# Patient Record
Sex: MALE | Race: WHITE | NOT HISPANIC OR LATINO | Employment: OTHER | URBAN - METROPOLITAN AREA
[De-identification: names, ages, dates, MRNs, and addresses within clinical notes are randomized per-mention and may not be internally consistent; named-entity substitution may affect disease eponyms.]

---

## 2023-03-04 PROBLEM — I21.4 NON-ST ELEVATION MYOCARDIAL INFARCTION (NSTEMI), SUBSEQUENT EPISODE OF CARE (MULTI): Status: ACTIVE | Noted: 2023-03-04

## 2023-03-04 PROBLEM — K21.9 GERD (GASTROESOPHAGEAL REFLUX DISEASE): Status: ACTIVE | Noted: 2023-03-04

## 2023-03-04 PROBLEM — E11.39: Status: ACTIVE | Noted: 2023-03-04

## 2023-03-04 PROBLEM — R68.89 COLD INTOLERANCE: Status: ACTIVE | Noted: 2023-03-04

## 2023-03-04 PROBLEM — E66.9 OBESITY: Status: ACTIVE | Noted: 2023-03-04

## 2023-03-04 PROBLEM — I25.10 CORONARY ARTERY DISEASE: Status: ACTIVE | Noted: 2023-03-04

## 2023-03-04 PROBLEM — K57.90 DIVERTICULOSIS: Status: ACTIVE | Noted: 2023-03-04

## 2023-03-04 PROBLEM — I44.0 HEART BLOCK AV FIRST DEGREE: Status: ACTIVE | Noted: 2023-03-04

## 2023-03-04 PROBLEM — Z86.010 HISTORY OF COLON POLYPS: Status: ACTIVE | Noted: 2023-03-04

## 2023-03-04 PROBLEM — R13.10 DYSPHAGIA: Status: ACTIVE | Noted: 2023-03-04

## 2023-03-04 PROBLEM — Z86.0100 HISTORY OF COLON POLYPS: Status: ACTIVE | Noted: 2023-03-04

## 2023-03-04 PROBLEM — Z86.79 HISTORY OF ATRIAL FIBRILLATION: Status: ACTIVE | Noted: 2023-03-04

## 2023-03-04 PROBLEM — R93.1 HIGH CORONARY ARTERY CALCIUM SCORE: Status: ACTIVE | Noted: 2023-03-04

## 2023-03-04 PROBLEM — N18.2 CKD STAGE 2 DUE TO TYPE 2 DIABETES MELLITUS (MULTI): Status: ACTIVE | Noted: 2023-03-04

## 2023-03-04 PROBLEM — I10 BENIGN ESSENTIAL HYPERTENSION: Status: ACTIVE | Noted: 2023-03-04

## 2023-03-04 PROBLEM — M25.511 SHOULDER PAIN, RIGHT: Status: ACTIVE | Noted: 2023-03-04

## 2023-03-04 PROBLEM — R94.31 ABNORMAL EKG: Status: ACTIVE | Noted: 2023-03-04

## 2023-03-04 PROBLEM — I95.9 SYMPTOMATIC HYPOTENSION: Status: ACTIVE | Noted: 2023-03-04

## 2023-03-04 PROBLEM — Z86.39 HISTORY OF HYPERLIPIDEMIA: Status: ACTIVE | Noted: 2023-03-04

## 2023-03-04 PROBLEM — K22.70 BARRETT ESOPHAGUS: Status: ACTIVE | Noted: 2023-03-04

## 2023-03-04 PROBLEM — E11.22 CKD STAGE 2 DUE TO TYPE 2 DIABETES MELLITUS (MULTI): Status: ACTIVE | Noted: 2023-03-04

## 2023-03-04 PROBLEM — G47.33 OSA (OBSTRUCTIVE SLEEP APNEA): Status: ACTIVE | Noted: 2023-03-04

## 2023-03-04 PROBLEM — N18.31 STAGE 3A CHRONIC KIDNEY DISEASE (MULTI): Status: ACTIVE | Noted: 2023-03-04

## 2023-03-04 RX ORDER — INSULIN GLARGINE 300 U/ML
6 INJECTION, SOLUTION SUBCUTANEOUS NIGHTLY
COMMUNITY
End: 2023-05-02 | Stop reason: ALTCHOICE

## 2023-03-04 RX ORDER — PEN NEEDLE, DIABETIC 30 GX3/16"
NEEDLE, DISPOSABLE MISCELLANEOUS 4 TIMES DAILY
COMMUNITY

## 2023-03-04 RX ORDER — ASPIRIN 325 MG
50000 TABLET, DELAYED RELEASE (ENTERIC COATED) ORAL
COMMUNITY
Start: 2022-04-18 | End: 2023-04-26 | Stop reason: ALTCHOICE

## 2023-03-04 RX ORDER — CANDESARTAN 32 MG/1
0.5 TABLET ORAL DAILY
COMMUNITY
End: 2023-05-02 | Stop reason: DRUGHIGH

## 2023-03-04 RX ORDER — ROSUVASTATIN CALCIUM 40 MG/1
40 TABLET, COATED ORAL DAILY
COMMUNITY
End: 2023-05-10 | Stop reason: SDUPTHER

## 2023-03-04 RX ORDER — SEMAGLUTIDE 1.34 MG/ML
INJECTION, SOLUTION SUBCUTANEOUS
COMMUNITY
End: 2023-04-19 | Stop reason: ALTCHOICE

## 2023-03-04 RX ORDER — FLASH GLUCOSE SENSOR
KIT MISCELLANEOUS
COMMUNITY
End: 2023-11-01 | Stop reason: SDUPTHER

## 2023-03-04 RX ORDER — ASPIRIN 81 MG/1
1 TABLET ORAL DAILY
COMMUNITY
Start: 2022-04-26

## 2023-03-04 RX ORDER — PRASUGREL 10 MG/1
10 TABLET, FILM COATED ORAL DAILY
COMMUNITY
Start: 2022-11-29 | End: 2024-04-15 | Stop reason: SDUPTHER

## 2023-03-04 RX ORDER — INSULIN LISPRO 100 [IU]/ML
60-100 INJECTION, SOLUTION INTRAVENOUS; SUBCUTANEOUS
COMMUNITY
Start: 2022-03-07

## 2023-03-04 RX ORDER — METOPROLOL SUCCINATE 25 MG/1
0.5 TABLET, EXTENDED RELEASE ORAL DAILY
COMMUNITY
End: 2023-05-09 | Stop reason: SDUPTHER

## 2023-03-04 RX ORDER — LANSOPRAZOLE 30 MG/1
30 CAPSULE, DELAYED RELEASE ORAL
COMMUNITY
End: 2023-11-13

## 2023-03-07 ENCOUNTER — APPOINTMENT (OUTPATIENT)
Dept: PRIMARY CARE | Facility: CLINIC | Age: 63
End: 2023-03-07
Payer: COMMERCIAL

## 2023-03-16 ENCOUNTER — APPOINTMENT (OUTPATIENT)
Dept: PRIMARY CARE | Facility: CLINIC | Age: 63
End: 2023-03-16
Payer: COMMERCIAL

## 2023-03-16 ENCOUNTER — TELEPHONE (OUTPATIENT)
Dept: PRIMARY CARE | Facility: CLINIC | Age: 63
End: 2023-03-16

## 2023-03-16 DIAGNOSIS — E11.39 TYPE 2 DIABETES MELLITUS WITH OTHER OPHTHALMIC COMPLICATION, UNSPECIFIED WHETHER LONG TERM INSULIN USE (MULTI): ICD-10-CM

## 2023-03-16 DIAGNOSIS — Z00.00 HEALTH CARE MAINTENANCE: ICD-10-CM

## 2023-03-20 NOTE — TELEPHONE ENCOUNTER
Other than out standard labs - do not see anything add'l to order per cardio but per [facility] doc suggested TSH, Lipids,CMP, A1c, and in orders is albumin (spot) - will add the boldened ones - Thanks!

## 2023-04-06 ENCOUNTER — APPOINTMENT (OUTPATIENT)
Dept: PRIMARY CARE | Facility: CLINIC | Age: 63
End: 2023-04-06
Payer: COMMERCIAL

## 2023-04-17 ENCOUNTER — LAB (OUTPATIENT)
Dept: LAB | Facility: LAB | Age: 63
End: 2023-04-17
Payer: COMMERCIAL

## 2023-04-17 DIAGNOSIS — Z00.00 HEALTH CARE MAINTENANCE: ICD-10-CM

## 2023-04-17 DIAGNOSIS — E11.39 TYPE 2 DIABETES MELLITUS WITH OTHER OPHTHALMIC COMPLICATION, UNSPECIFIED WHETHER LONG TERM INSULIN USE (MULTI): ICD-10-CM

## 2023-04-17 LAB
ALANINE AMINOTRANSFERASE (SGPT) (U/L) IN SER/PLAS: 43 U/L (ref 10–52)
ALBUMIN (G/DL) IN SER/PLAS: 4.7 G/DL (ref 3.4–5)
ALKALINE PHOSPHATASE (U/L) IN SER/PLAS: 104 U/L (ref 33–136)
ANION GAP IN SER/PLAS: 16 MMOL/L (ref 10–20)
ASPARTATE AMINOTRANSFERASE (SGOT) (U/L) IN SER/PLAS: 33 U/L (ref 9–39)
BASOPHILS (10*3/UL) IN BLOOD BY AUTOMATED COUNT: 0.12 X10E9/L (ref 0–0.1)
BASOPHILS/100 LEUKOCYTES IN BLOOD BY AUTOMATED COUNT: 1.2 % (ref 0–2)
BILIRUBIN TOTAL (MG/DL) IN SER/PLAS: 0.7 MG/DL (ref 0–1.2)
C REACTIVE PROTEIN (MG/L) IN SER/PLAS BY HIGH SENSIT: 1.5 MG/L
CALCIDIOL (25 OH VITAMIN D3) (NG/ML) IN SER/PLAS: 95 NG/ML
CALCIUM (MG/DL) IN SER/PLAS: 10.3 MG/DL (ref 8.6–10.6)
CARBON DIOXIDE, TOTAL (MMOL/L) IN SER/PLAS: 27 MMOL/L (ref 21–32)
CHLORIDE (MMOL/L) IN SER/PLAS: 102 MMOL/L (ref 98–107)
CHOLESTEROL (MG/DL) IN SER/PLAS: 103 MG/DL (ref 0–199)
CHOLESTEROL IN HDL (MG/DL) IN SER/PLAS: 38 MG/DL
CHOLESTEROL/HDL RATIO: 2.7
CREATININE (MG/DL) IN SER/PLAS: 1.42 MG/DL (ref 0.5–1.3)
EOSINOPHILS (10*3/UL) IN BLOOD BY AUTOMATED COUNT: 0.39 X10E9/L (ref 0–0.7)
EOSINOPHILS/100 LEUKOCYTES IN BLOOD BY AUTOMATED COUNT: 3.7 % (ref 0–6)
ERYTHROCYTE DISTRIBUTION WIDTH (RATIO) BY AUTOMATED COUNT: 13.8 % (ref 11.5–14.5)
ERYTHROCYTE MEAN CORPUSCULAR HEMOGLOBIN CONCENTRATION (G/DL) BY AUTOMATED: 32.4 G/DL (ref 32–36)
ERYTHROCYTE MEAN CORPUSCULAR VOLUME (FL) BY AUTOMATED COUNT: 91 FL (ref 80–100)
ERYTHROCYTES (10*6/UL) IN BLOOD BY AUTOMATED COUNT: 5.59 X10E12/L (ref 4.5–5.9)
GFR MALE: 55 ML/MIN/1.73M2
GLUCOSE (MG/DL) IN SER/PLAS: 76 MG/DL (ref 74–99)
HEMATOCRIT (%) IN BLOOD BY AUTOMATED COUNT: 51 % (ref 41–52)
HEMOGLOBIN (G/DL) IN BLOOD: 16.5 G/DL (ref 13.5–17.5)
IMMATURE GRANULOCYTES/100 LEUKOCYTES IN BLOOD BY AUTOMATED COUNT: 0.4 % (ref 0–0.9)
LDL: 44 MG/DL (ref 0–99)
LEUKOCYTES (10*3/UL) IN BLOOD BY AUTOMATED COUNT: 10.4 X10E9/L (ref 4.4–11.3)
LYMPHOCYTES (10*3/UL) IN BLOOD BY AUTOMATED COUNT: 2.2 X10E9/L (ref 1.2–4.8)
LYMPHOCYTES/100 LEUKOCYTES IN BLOOD BY AUTOMATED COUNT: 21.1 % (ref 13–44)
MONOCYTES (10*3/UL) IN BLOOD BY AUTOMATED COUNT: 0.8 X10E9/L (ref 0.1–1)
MONOCYTES/100 LEUKOCYTES IN BLOOD BY AUTOMATED COUNT: 7.7 % (ref 2–10)
NEUTROPHILS (10*3/UL) IN BLOOD BY AUTOMATED COUNT: 6.88 X10E9/L (ref 1.2–7.7)
NEUTROPHILS/100 LEUKOCYTES IN BLOOD BY AUTOMATED COUNT: 65.9 % (ref 40–80)
NRBC (PER 100 WBCS) BY AUTOMATED COUNT: 0 /100 WBC (ref 0–0)
PLATELETS (10*3/UL) IN BLOOD AUTOMATED COUNT: 256 X10E9/L (ref 150–450)
POTASSIUM (MMOL/L) IN SER/PLAS: 5 MMOL/L (ref 3.5–5.3)
PROSTATE SPECIFIC ANTIGEN,SCREEN: 1.15 NG/ML (ref 0–4)
PROTEIN TOTAL: 7.1 G/DL (ref 6.4–8.2)
SODIUM (MMOL/L) IN SER/PLAS: 140 MMOL/L (ref 136–145)
THYROTROPIN (MIU/L) IN SER/PLAS BY DETECTION LIMIT <= 0.05 MIU/L: 2.11 MIU/L (ref 0.44–3.98)
TRIGLYCERIDE (MG/DL) IN SER/PLAS: 104 MG/DL (ref 0–149)
UREA NITROGEN (MG/DL) IN SER/PLAS: 28 MG/DL (ref 6–23)
VLDL: 21 MG/DL (ref 0–40)

## 2023-04-17 PROCEDURE — 36415 COLL VENOUS BLD VENIPUNCTURE: CPT

## 2023-04-17 PROCEDURE — 85025 COMPLETE CBC W/AUTO DIFF WBC: CPT

## 2023-04-17 PROCEDURE — 84153 ASSAY OF PSA TOTAL: CPT

## 2023-04-17 PROCEDURE — 82306 VITAMIN D 25 HYDROXY: CPT

## 2023-04-17 PROCEDURE — 84443 ASSAY THYROID STIM HORMONE: CPT

## 2023-04-17 PROCEDURE — 80053 COMPREHEN METABOLIC PANEL: CPT

## 2023-04-17 PROCEDURE — 83036 HEMOGLOBIN GLYCOSYLATED A1C: CPT

## 2023-04-17 PROCEDURE — 86141 C-REACTIVE PROTEIN HS: CPT

## 2023-04-17 PROCEDURE — 80061 LIPID PANEL: CPT

## 2023-04-18 ENCOUNTER — APPOINTMENT (OUTPATIENT)
Dept: LAB | Facility: LAB | Age: 63
End: 2023-04-18
Payer: COMMERCIAL

## 2023-04-18 LAB
ALBUMIN (MG/L) IN URINE: 123.3 MG/L
ALBUMIN/CREATININE (UG/MG) IN URINE: 108.2 UG/MG CRT (ref 0–30)
CREATININE (MG/DL) IN URINE: 114 MG/DL (ref 20–370)
ESTIMATED AVERAGE GLUCOSE FOR HBA1C: 126 MG/DL
HEMOGLOBIN A1C/HEMOGLOBIN TOTAL IN BLOOD: 6 %

## 2023-04-18 PROCEDURE — 81003 URINALYSIS AUTO W/O SCOPE: CPT

## 2023-04-18 PROCEDURE — 82043 UR ALBUMIN QUANTITATIVE: CPT

## 2023-04-18 PROCEDURE — 82570 ASSAY OF URINE CREATININE: CPT

## 2023-04-19 ENCOUNTER — OFFICE VISIT (OUTPATIENT)
Dept: PRIMARY CARE | Facility: CLINIC | Age: 63
End: 2023-04-19
Payer: COMMERCIAL

## 2023-04-19 VITALS
BODY MASS INDEX: 28.98 KG/M2 | WEIGHT: 207 LBS | TEMPERATURE: 98 F | SYSTOLIC BLOOD PRESSURE: 100 MMHG | HEIGHT: 71 IN | HEART RATE: 88 BPM | OXYGEN SATURATION: 99 % | DIASTOLIC BLOOD PRESSURE: 58 MMHG

## 2023-04-19 DIAGNOSIS — I25.10 CORONARY ARTERY DISEASE INVOLVING NATIVE CORONARY ARTERY OF NATIVE HEART WITHOUT ANGINA PECTORIS: ICD-10-CM

## 2023-04-19 DIAGNOSIS — E11.39 TYPE 2 DIABETES MELLITUS WITH OTHER OPHTHALMIC COMPLICATION, WITH LONG-TERM CURRENT USE OF INSULIN (MULTI): ICD-10-CM

## 2023-04-19 DIAGNOSIS — N18.31 STAGE 3A CHRONIC KIDNEY DISEASE (MULTI): ICD-10-CM

## 2023-04-19 DIAGNOSIS — Z79.4 TYPE 2 DIABETES MELLITUS WITH OTHER OPHTHALMIC COMPLICATION, WITH LONG-TERM CURRENT USE OF INSULIN (MULTI): ICD-10-CM

## 2023-04-19 DIAGNOSIS — Z00.01 ENCOUNTER FOR GENERAL ADULT MEDICAL EXAMINATION WITH ABNORMAL FINDINGS: Primary | ICD-10-CM

## 2023-04-19 DIAGNOSIS — E67.3 HIGH VITAMIN D LEVEL: ICD-10-CM

## 2023-04-19 LAB
APPEARANCE, URINE: ABNORMAL
BILIRUBIN, URINE: NEGATIVE
BLOOD, URINE: NEGATIVE
COLOR, URINE: YELLOW
GLUCOSE, URINE: ABNORMAL MG/DL
KETONES, URINE: ABNORMAL MG/DL
LEUKOCYTE ESTERASE, URINE: NEGATIVE
NITRITE, URINE: NEGATIVE
PH, URINE: 5 (ref 5–8)
PROTEIN, URINE: NEGATIVE MG/DL
SPECIFIC GRAVITY, URINE: 1.02 (ref 1–1.03)
UROBILINOGEN, URINE: <2 MG/DL (ref 0–1.9)

## 2023-04-19 PROCEDURE — 1036F TOBACCO NON-USER: CPT | Performed by: FAMILY MEDICINE

## 2023-04-19 PROCEDURE — UHSPHYS PR UH SELECT PHYSICAL: Performed by: FAMILY MEDICINE

## 2023-04-19 PROCEDURE — 3074F SYST BP LT 130 MM HG: CPT | Performed by: FAMILY MEDICINE

## 2023-04-19 PROCEDURE — 3044F HG A1C LEVEL LT 7.0%: CPT | Performed by: FAMILY MEDICINE

## 2023-04-19 PROCEDURE — 4010F ACE/ARB THERAPY RXD/TAKEN: CPT | Performed by: FAMILY MEDICINE

## 2023-04-19 PROCEDURE — 3078F DIAST BP <80 MM HG: CPT | Performed by: FAMILY MEDICINE

## 2023-04-19 RX ORDER — BLOOD-GLUCOSE METER
EACH MISCELLANEOUS
COMMUNITY
Start: 2019-12-17

## 2023-04-19 ASSESSMENT — ENCOUNTER SYMPTOMS
LOSS OF SENSATION IN FEET: 1
DEPRESSION: 0
OCCASIONAL FEELINGS OF UNSTEADINESS: 0

## 2023-04-19 ASSESSMENT — PATIENT HEALTH QUESTIONNAIRE - PHQ9
1. LITTLE INTEREST OR PLEASURE IN DOING THINGS: NOT AT ALL
SUM OF ALL RESPONSES TO PHQ9 QUESTIONS 1 & 2: 0
2. FEELING DOWN, DEPRESSED OR HOPELESS: NOT AT ALL

## 2023-04-19 ASSESSMENT — PAIN SCALES - GENERAL: PAINLEVEL: 0-NO PAIN

## 2023-04-19 NOTE — PROGRESS NOTES
"Subjective   Patient ID: Harpal Covarrubias is a 63 y.o. male who presents for Annual Exam ([facility]).  HPI  Here for annual visit    1) concerned about his blood pressure getting on the lower side  Systolics  and diastolics 55-65  No significant chest pain, shortness of breath, dizziness, lightheadedness, hand or foot swelling that is new or different  He has been followed by Dr. Dillard  He is still going to cardiac rehab and would like to start some resistance training if possible  Likes the cardiac rehab but he feels that it has  seemingly served its purpose and he noteds he had to encourage the staff there to move him along faster  He questions if he still needs to continue this or if he can start on his own    2) With his substantial weight loss he is questioning if he would be a candidate for any kind of surgical approach to reduce some of the extra tissue he is carrying around his abdomen as well as his \"turkey neck\"  Told him it was entirely appropriate to do that and sometimes this is actually covered by insurance due to skin infection risk associated with substantial weight loss such as he is obtained    3) diabetes management he is off of Humalog altogether and he is on Toujeo as well as his other meds including Ozempic  He has had his alarm go off at night for which he is taking glucose tablets to fix  Recent A1c was 6.0, last before that was 8.2  He has not spoken with Dr. Hartley recently but recommend to be touch base with him soon to significantly reduce his insulin  He is also putting glucose  But he is on Farxiga    4) kidney health, in the past he has seen Dr. Vasques  Questions if he can reduce his candesartan given his lower blood pressure readings  However he does not report any significant orthostatic hypotension  However would be appropriate to monitor his urine albumin as well as his GFR    5) upper range of normal vitamin D, did discuss reducing dose and to repeat in a few weeks - " He is not showing any significant signs of hypervitaminosis D    Review of Systems   Constitutional:  Negative for diaphoresis, fatigue, fever and unexpected weight change.   HENT:  Negative for ear pain and hearing loss.    Eyes:  Negative for pain.   Respiratory:  Negative for cough and shortness of breath.    Cardiovascular:  Negative for chest pain and leg swelling.   Gastrointestinal:  Negative for abdominal pain, blood in stool, constipation and diarrhea.   Genitourinary:  Negative for dysuria and frequency.   Musculoskeletal:  Negative for arthralgias, back pain, joint swelling and myalgias.   Skin:  Negative for rash.   Neurological:  Negative for dizziness, tremors, weakness and headaches.   Psychiatric/Behavioral:  Negative for behavioral problems, dysphoric mood and sleep disturbance. The patient is not nervous/anxious.        Objective   Physical Exam  Vitals and nursing note reviewed.   Constitutional:       General: He is not in acute distress.     Appearance: He is not ill-appearing.   HENT:      Head: Normocephalic and atraumatic.      Right Ear: Tympanic membrane normal.      Left Ear: Tympanic membrane normal.      Mouth/Throat:      Pharynx: No posterior oropharyngeal erythema.   Eyes:      General: No scleral icterus.     Extraocular Movements: Extraocular movements intact.      Pupils: Pupils are equal, round, and reactive to light.   Cardiovascular:      Rate and Rhythm: Normal rate and regular rhythm.      Heart sounds: No murmur heard.  Pulmonary:      Breath sounds: Normal breath sounds. No wheezing or rhonchi.   Abdominal:      General: Bowel sounds are normal. There is no distension.      Palpations: Abdomen is soft.      Tenderness: There is no abdominal tenderness.   Musculoskeletal:         General: Normal range of motion.      Cervical back: Normal range of motion.      Right lower leg: No edema.      Left lower leg: No edema.   Lymphadenopathy:      Cervical: No cervical adenopathy.    Skin:     General: Skin is warm and dry.   Neurological:      Mental Status: He is alert and oriented to person, place, and time. Mental status is at baseline.      Motor: No weakness.      Coordination: Coordination normal.      Deep Tendon Reflexes: Reflexes normal.   Psychiatric:         Mood and Affect: Mood normal.         Behavior: Behavior normal.         Thought Content: Thought content normal.         Judgment: Judgment normal.         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

## 2023-04-26 PROBLEM — E66.9 OBESITY, CLASS II, BMI 35-39.9: Status: ACTIVE | Noted: 2022-04-11

## 2023-04-26 PROBLEM — E11.3293 MILD NONPROLIFERATIVE DIABETIC RETINOPATHY OF BOTH EYES ASSOCIATED WITH TYPE 2 DIABETES MELLITUS (MULTI): Status: ACTIVE | Noted: 2020-02-03

## 2023-04-26 PROBLEM — E66.812 OBESITY, CLASS II, BMI 35-39.9: Status: ACTIVE | Noted: 2022-04-11

## 2023-04-26 ASSESSMENT — ENCOUNTER SYMPTOMS
HEADACHES: 0
ABDOMINAL PAIN: 0
SLEEP DISTURBANCE: 0
FEVER: 0
FATIGUE: 0
MYALGIAS: 0
JOINT SWELLING: 0
BLOOD IN STOOL: 0
CONSTIPATION: 0
EYE PAIN: 0
COUGH: 0
DIAPHORESIS: 0
DIZZINESS: 0
UNEXPECTED WEIGHT CHANGE: 0
SHORTNESS OF BREATH: 0
DIARRHEA: 0
DYSPHORIC MOOD: 0
FREQUENCY: 0
NERVOUS/ANXIOUS: 0
ARTHRALGIAS: 0
BACK PAIN: 0
TREMORS: 0
DYSURIA: 0
WEAKNESS: 0

## 2023-05-02 RX ORDER — CANDESARTAN 8 MG/1
8 TABLET ORAL DAILY
Qty: 90 TABLET | Refills: 2 | Status: SHIPPED | OUTPATIENT
Start: 2023-05-02 | End: 2023-07-31 | Stop reason: DRUGHIGH

## 2023-05-09 DIAGNOSIS — E11.3293 MILD NONPROLIFERATIVE DIABETIC RETINOPATHY OF BOTH EYES ASSOCIATED WITH TYPE 2 DIABETES MELLITUS, MACULAR EDEMA PRESENCE UNSPECIFIED (MULTI): ICD-10-CM

## 2023-05-09 DIAGNOSIS — I21.4 NON-ST ELEVATION MYOCARDIAL INFARCTION (NSTEMI), SUBSEQUENT EPISODE OF CARE (MULTI): ICD-10-CM

## 2023-05-09 DIAGNOSIS — I10 ESSENTIAL HYPERTENSION: ICD-10-CM

## 2023-05-09 DIAGNOSIS — R93.1 HIGH CORONARY ARTERY CALCIUM SCORE: ICD-10-CM

## 2023-05-09 RX ORDER — INSULIN GLARGINE 300 U/ML
35 INJECTION, SOLUTION SUBCUTANEOUS NIGHTLY
Qty: 11 ML | Refills: 2 | Status: SHIPPED | OUTPATIENT
Start: 2023-05-09

## 2023-05-09 RX ORDER — METOPROLOL SUCCINATE 25 MG/1
12.5 TABLET, EXTENDED RELEASE ORAL DAILY
Qty: 45 TABLET | Refills: 2 | Status: SHIPPED | OUTPATIENT
Start: 2023-05-09 | End: 2023-05-09 | Stop reason: SDUPTHER

## 2023-05-09 RX ORDER — METOPROLOL SUCCINATE 25 MG/1
12.5 TABLET, EXTENDED RELEASE ORAL DAILY
Qty: 5 TABLET | Refills: 0 | Status: SHIPPED | OUTPATIENT
Start: 2023-05-09 | End: 2023-05-15 | Stop reason: SDUPTHER

## 2023-05-09 RX ORDER — INSULIN GLARGINE 300 U/ML
INJECTION, SOLUTION SUBCUTANEOUS
COMMUNITY
Start: 2022-06-15 | End: 2023-05-09 | Stop reason: SDUPTHER

## 2023-05-10 DIAGNOSIS — Z86.39 HISTORY OF HYPERLIPIDEMIA: ICD-10-CM

## 2023-05-10 DIAGNOSIS — R93.1 HIGH CORONARY ARTERY CALCIUM SCORE: ICD-10-CM

## 2023-05-10 DIAGNOSIS — I25.10 CORONARY ARTERY DISEASE INVOLVING NATIVE HEART WITHOUT ANGINA PECTORIS, UNSPECIFIED VESSEL OR LESION TYPE: ICD-10-CM

## 2023-05-10 RX ORDER — ROSUVASTATIN CALCIUM 40 MG/1
40 TABLET, COATED ORAL DAILY
Qty: 90 TABLET | Refills: 2 | Status: SHIPPED | OUTPATIENT
Start: 2023-05-10 | End: 2024-01-12

## 2023-05-15 RX ORDER — METOPROLOL SUCCINATE 25 MG/1
12.5 TABLET, EXTENDED RELEASE ORAL DAILY
Qty: 5 TABLET | Refills: 0 | Status: SHIPPED | OUTPATIENT
Start: 2023-05-15 | End: 2024-01-22

## 2023-05-15 NOTE — PROGRESS NOTES
Requested Prescriptions     Signed Prescriptions Disp Refills    metoprolol succinate XL (Toprol-XL) 25 mg 24 hr tablet 5 tablet 0     Sig: Take 0.5 tablets (12.5 mg) by mouth once daily. Do not crush or chew.

## 2023-06-13 ENCOUNTER — TELEPHONE (OUTPATIENT)
Dept: PRIMARY CARE | Facility: CLINIC | Age: 63
End: 2023-06-13
Payer: COMMERCIAL

## 2023-06-13 DIAGNOSIS — R11.2 NAUSEA AND VOMITING, UNSPECIFIED VOMITING TYPE: Primary | ICD-10-CM

## 2023-06-13 RX ORDER — ONDANSETRON 4 MG/1
4-8 TABLET, ORALLY DISINTEGRATING ORAL EVERY 8 HOURS PRN
Qty: 30 TABLET | Refills: 0 | Status: SHIPPED | OUTPATIENT
Start: 2023-06-13 | End: 2023-06-20

## 2023-06-13 NOTE — TELEPHONE ENCOUNTER
Confirmed that RX was received by pharmacy.  Do you want me to schedule him for later today or are we waiting for now?

## 2023-06-13 NOTE — TELEPHONE ENCOUNTER
Texted regarding significant nausea and vomiting - had been dealing with diarrhea - he questions if his food choices lately have led to this - no fever/chills - no blood - no RUQ pain - concern about hydration     Offered to Eval and/or get to GI - feels would like to first try med - has had before - can try tiffany product too     Requested Prescriptions     Signed Prescriptions Disp Refills    ondansetron ODT (Zofran-ODT) 4 mg disintegrating tablet 30 tablet 0     Sig: Take 1-2 tablets (4-8 mg) by mouth every 8 hours if needed for nausea or vomiting for up to 7 days.     Authorizing Provider: NIKUNJ OTERO

## 2023-06-15 LAB
ALANINE AMINOTRANSFERASE (SGPT) (U/L) IN SER/PLAS: 23 U/L (ref 10–52)
ALBUMIN (G/DL) IN SER/PLAS: 4.2 G/DL (ref 3.4–5)
ALBUMIN (MG/L) IN URINE: 40.9 MG/L
ALBUMIN/CREATININE (UG/MG) IN URINE: 37.2 UG/MG CRT (ref 0–30)
ALKALINE PHOSPHATASE (U/L) IN SER/PLAS: 87 U/L (ref 33–136)
ANION GAP IN SER/PLAS: 14 MMOL/L (ref 10–20)
ASPARTATE AMINOTRANSFERASE (SGOT) (U/L) IN SER/PLAS: 17 U/L (ref 9–39)
BILIRUBIN TOTAL (MG/DL) IN SER/PLAS: 0.7 MG/DL (ref 0–1.2)
CALCIUM (MG/DL) IN SER/PLAS: 10.3 MG/DL (ref 8.6–10.6)
CARBON DIOXIDE, TOTAL (MMOL/L) IN SER/PLAS: 31 MMOL/L (ref 21–32)
CHLORIDE (MMOL/L) IN SER/PLAS: 102 MMOL/L (ref 98–107)
CHOLESTEROL (MG/DL) IN SER/PLAS: 82 MG/DL (ref 0–199)
CHOLESTEROL IN HDL (MG/DL) IN SER/PLAS: 36 MG/DL
CHOLESTEROL/HDL RATIO: 2.3
CREATININE (MG/DL) IN SER/PLAS: 1.42 MG/DL (ref 0.5–1.3)
CREATININE (MG/DL) IN URINE: 110 MG/DL (ref 20–370)
ESTIMATED AVERAGE GLUCOSE FOR HBA1C: 137 MG/DL
GFR MALE: 55 ML/MIN/1.73M2
GLUCOSE (MG/DL) IN SER/PLAS: 93 MG/DL (ref 74–99)
HEMOGLOBIN A1C/HEMOGLOBIN TOTAL IN BLOOD: 6.4 %
LDL: 24 MG/DL (ref 0–99)
POTASSIUM (MMOL/L) IN SER/PLAS: 5.3 MMOL/L (ref 3.5–5.3)
PROTEIN TOTAL: 7.1 G/DL (ref 6.4–8.2)
SODIUM (MMOL/L) IN SER/PLAS: 142 MMOL/L (ref 136–145)
TRIGLYCERIDE (MG/DL) IN SER/PLAS: 111 MG/DL (ref 0–149)
UREA NITROGEN (MG/DL) IN SER/PLAS: 24 MG/DL (ref 6–23)
VLDL: 22 MG/DL (ref 0–40)

## 2023-07-18 ENCOUNTER — OFFICE VISIT (OUTPATIENT)
Dept: PRIMARY CARE | Facility: CLINIC | Age: 63
End: 2023-07-18
Payer: COMMERCIAL

## 2023-07-18 VITALS
HEIGHT: 70 IN | DIASTOLIC BLOOD PRESSURE: 58 MMHG | SYSTOLIC BLOOD PRESSURE: 74 MMHG | BODY MASS INDEX: 26.97 KG/M2 | TEMPERATURE: 98.3 F | HEART RATE: 44 BPM | OXYGEN SATURATION: 96 % | WEIGHT: 188.4 LBS

## 2023-07-18 DIAGNOSIS — Z79.4 TYPE 2 DIABETES MELLITUS WITHOUT COMPLICATION, WITH LONG-TERM CURRENT USE OF INSULIN (MULTI): ICD-10-CM

## 2023-07-18 DIAGNOSIS — R31.9 HEMATURIA, UNSPECIFIED TYPE: Primary | ICD-10-CM

## 2023-07-18 DIAGNOSIS — R11.0 NAUSEA: ICD-10-CM

## 2023-07-18 DIAGNOSIS — E11.9 TYPE 2 DIABETES MELLITUS WITHOUT COMPLICATION, WITH LONG-TERM CURRENT USE OF INSULIN (MULTI): ICD-10-CM

## 2023-07-18 PROCEDURE — 3074F SYST BP LT 130 MM HG: CPT | Performed by: FAMILY MEDICINE

## 2023-07-18 PROCEDURE — 99214 OFFICE O/P EST MOD 30 MIN: CPT | Performed by: FAMILY MEDICINE

## 2023-07-18 PROCEDURE — 3066F NEPHROPATHY DOC TX: CPT | Performed by: FAMILY MEDICINE

## 2023-07-18 PROCEDURE — 3044F HG A1C LEVEL LT 7.0%: CPT | Performed by: FAMILY MEDICINE

## 2023-07-18 PROCEDURE — 1036F TOBACCO NON-USER: CPT | Performed by: FAMILY MEDICINE

## 2023-07-18 PROCEDURE — 4010F ACE/ARB THERAPY RXD/TAKEN: CPT | Performed by: FAMILY MEDICINE

## 2023-07-18 PROCEDURE — 3078F DIAST BP <80 MM HG: CPT | Performed by: FAMILY MEDICINE

## 2023-07-18 PROCEDURE — 3008F BODY MASS INDEX DOCD: CPT | Performed by: FAMILY MEDICINE

## 2023-07-18 RX ORDER — ASPIRIN 325 MG
1 TABLET, DELAYED RELEASE (ENTERIC COATED) ORAL WEEKLY
COMMUNITY
Start: 2023-04-17

## 2023-07-18 RX ORDER — BLOOD-GLUCOSE SENSOR
EACH MISCELLANEOUS
COMMUNITY
Start: 2023-07-03

## 2023-07-18 ASSESSMENT — PAIN SCALES - GENERAL: PAINLEVEL: 3

## 2023-07-18 NOTE — PROGRESS NOTES
"Subjective   Patient ID: Harpal Covarrubias is a 63 y.o. male who presents for GI Problem (Nausea, abdominal cramping, vomiting, pain).  HPI  Issues as noted above  He was having some similar issues about a month ago which ultimately remitted and he rebounded pretty much to his baseline  Was not feeling well other past week or so  Reached out yesterday when he was really having some trouble with nausea and pain -arrange for him to come in today for assessment and lab work  He had called this morning to note that he was having blood in his urine, without any significant UTI or kidney stone related symptoms  No new cardiac issues as denied chest pain, shortness of breath, dizziness, lightheadedness, hand or foot swelling that is new or different.  Taking and tolerating medications well.  That said, he did not take his Ozempic last night in light of some of his symptoms and questioning if that could have been in part related to that  Review of Systems  Essentially noncontributory otherwise    Objective   BP 74/58 (BP Location: Left arm, Patient Position: Sitting, BP Cuff Size: Adult)   Pulse (!) 44   Temp 36.8 °C (98.3 °F) (Temporal)   Ht 1.778 m (5' 10\")   Wt 85.5 kg (188 lb 6.4 oz)   SpO2 96%   BMI 27.03 kg/m²   Repeat BP by me 76/52  Physical Exam  Gen: Appears pale and uncomfortable,  HEENT: Slightly pallorous palpebral conjunctiva, no scleral icterus  Lungs: Clear to auscultation bilaterally no wheeze or crackles  Cardiac: Slightly bradycardic, normal S1 and S2 without S3 or S4, no marked murmur   Extremities: No significant peripheral edema, radial pulses seems slightly thready  Skin: Slightly clammy to touch  Assessment/Plan   Problem List Items Addressed This Visit    None  Visit Diagnoses       Hematuria, unspecified type    -  Primary    Relevant Orders    CBC and Auto Differential    Comprehensive Metabolic Panel    Urinalysis with Reflex Microscopic    Urine Culture    Sedimentation Rate    C-Reactive " Protein    Amylase    Lipase    Hemoglobin A1c    Nausea        Relevant Orders    CBC and Auto Differential    Comprehensive Metabolic Panel    Urinalysis with Reflex Microscopic    Urine Culture    Sedimentation Rate    C-Reactive Protein    Amylase    Lipase    Type 2 diabetes mellitus without complication, with long-term current use of insulin (CMS/[facility])        Relevant Orders    Comprehensive Metabolic Panel        Very concerning symptoms and findings on exam, certainly appears to be volume depleted and based on his NSTEMI last fall feel he needs emergent eval and IVFs - uncertain etiology of his hematuria

## 2023-07-31 ENCOUNTER — OFFICE VISIT (OUTPATIENT)
Dept: PRIMARY CARE | Facility: CLINIC | Age: 63
End: 2023-07-31
Payer: COMMERCIAL

## 2023-07-31 ENCOUNTER — LAB (OUTPATIENT)
Dept: LAB | Facility: LAB | Age: 63
End: 2023-07-31
Payer: COMMERCIAL

## 2023-07-31 VITALS
BODY MASS INDEX: 27.26 KG/M2 | SYSTOLIC BLOOD PRESSURE: 90 MMHG | OXYGEN SATURATION: 94 % | TEMPERATURE: 98 F | HEART RATE: 76 BPM | DIASTOLIC BLOOD PRESSURE: 48 MMHG | WEIGHT: 190 LBS

## 2023-07-31 DIAGNOSIS — E11.39 TYPE 2 DIABETES MELLITUS WITH OTHER OPHTHALMIC COMPLICATION, WITH LONG-TERM CURRENT USE OF INSULIN (MULTI): ICD-10-CM

## 2023-07-31 DIAGNOSIS — I25.10 CORONARY ARTERY DISEASE INVOLVING NATIVE CORONARY ARTERY OF NATIVE HEART WITHOUT ANGINA PECTORIS: ICD-10-CM

## 2023-07-31 DIAGNOSIS — N39.0 URINARY TRACT INFECTION WITH HEMATURIA, SITE UNSPECIFIED: ICD-10-CM

## 2023-07-31 DIAGNOSIS — R31.9 HEMATURIA, UNSPECIFIED TYPE: ICD-10-CM

## 2023-07-31 DIAGNOSIS — Z79.4 TYPE 2 DIABETES MELLITUS WITH OTHER OPHTHALMIC COMPLICATION, WITH LONG-TERM CURRENT USE OF INSULIN (MULTI): ICD-10-CM

## 2023-07-31 DIAGNOSIS — E67.3 HIGH VITAMIN D LEVEL: ICD-10-CM

## 2023-07-31 DIAGNOSIS — R11.0 NAUSEA: ICD-10-CM

## 2023-07-31 DIAGNOSIS — R31.9 URINARY TRACT INFECTION WITH HEMATURIA, SITE UNSPECIFIED: Primary | ICD-10-CM

## 2023-07-31 DIAGNOSIS — E11.22 CKD STAGE 2 DUE TO TYPE 2 DIABETES MELLITUS (MULTI): ICD-10-CM

## 2023-07-31 DIAGNOSIS — R10.84 GENERALIZED ABDOMINAL PAIN: ICD-10-CM

## 2023-07-31 DIAGNOSIS — N18.31 STAGE 3A CHRONIC KIDNEY DISEASE (MULTI): ICD-10-CM

## 2023-07-31 DIAGNOSIS — R31.9 URINARY TRACT INFECTION WITH HEMATURIA, SITE UNSPECIFIED: ICD-10-CM

## 2023-07-31 DIAGNOSIS — N18.2 CKD STAGE 2 DUE TO TYPE 2 DIABETES MELLITUS (MULTI): ICD-10-CM

## 2023-07-31 DIAGNOSIS — N39.0 URINARY TRACT INFECTION WITH HEMATURIA, SITE UNSPECIFIED: Primary | ICD-10-CM

## 2023-07-31 DIAGNOSIS — I95.9 SYMPTOMATIC HYPOTENSION: ICD-10-CM

## 2023-07-31 LAB
ALANINE AMINOTRANSFERASE (SGPT) (U/L) IN SER/PLAS: 17 U/L (ref 10–52)
ALBUMIN (G/DL) IN SER/PLAS: 4.2 G/DL (ref 3.4–5)
ALBUMIN (MG/L) IN URINE: 29.8 MG/L
ALBUMIN/CREATININE (UG/MG) IN URINE: 47.7 UG/MG CRT (ref 0–30)
ALKALINE PHOSPHATASE (U/L) IN SER/PLAS: 87 U/L (ref 33–136)
AMYLASE (U/L) IN SER/PLAS: 85 U/L (ref 29–103)
ANION GAP IN SER/PLAS: 10 MMOL/L (ref 10–20)
APPEARANCE, URINE: CLEAR
ASPARTATE AMINOTRANSFERASE (SGOT) (U/L) IN SER/PLAS: 19 U/L (ref 9–39)
BASOPHILS (10*3/UL) IN BLOOD BY AUTOMATED COUNT: 0.16 X10E9/L (ref 0–0.1)
BASOPHILS/100 LEUKOCYTES IN BLOOD BY AUTOMATED COUNT: 1.7 % (ref 0–2)
BILIRUBIN TOTAL (MG/DL) IN SER/PLAS: 0.4 MG/DL (ref 0–1.2)
BILIRUBIN, URINE: NEGATIVE
BLOOD, URINE: NEGATIVE
CALCIUM (MG/DL) IN SER/PLAS: 9.6 MG/DL (ref 8.6–10.6)
CARBON DIOXIDE, TOTAL (MMOL/L) IN SER/PLAS: 29 MMOL/L (ref 21–32)
CHLORIDE (MMOL/L) IN SER/PLAS: 103 MMOL/L (ref 98–107)
COLOR, URINE: YELLOW
CREATININE (MG/DL) IN SER/PLAS: 1.76 MG/DL (ref 0.5–1.3)
CREATININE (MG/DL) IN URINE: 62.5 MG/DL (ref 20–370)
EOSINOPHILS (10*3/UL) IN BLOOD BY AUTOMATED COUNT: 0.43 X10E9/L (ref 0–0.7)
EOSINOPHILS/100 LEUKOCYTES IN BLOOD BY AUTOMATED COUNT: 4.6 % (ref 0–6)
ERYTHROCYTE DISTRIBUTION WIDTH (RATIO) BY AUTOMATED COUNT: 13.7 % (ref 11.5–14.5)
ERYTHROCYTE MEAN CORPUSCULAR HEMOGLOBIN CONCENTRATION (G/DL) BY AUTOMATED: 31.8 G/DL (ref 32–36)
ERYTHROCYTE MEAN CORPUSCULAR VOLUME (FL) BY AUTOMATED COUNT: 94 FL (ref 80–100)
ERYTHROCYTES (10*6/UL) IN BLOOD BY AUTOMATED COUNT: 5.19 X10E12/L (ref 4.5–5.9)
ESTIMATED AVERAGE GLUCOSE FOR HBA1C: 131 MG/DL
GFR MALE: 43 ML/MIN/1.73M2
GLUCOSE (MG/DL) IN SER/PLAS: 77 MG/DL (ref 74–99)
GLUCOSE, URINE: ABNORMAL MG/DL
HEMATOCRIT (%) IN BLOOD BY AUTOMATED COUNT: 48.7 % (ref 41–52)
HEMOGLOBIN (G/DL) IN BLOOD: 15.5 G/DL (ref 13.5–17.5)
HEMOGLOBIN A1C/HEMOGLOBIN TOTAL IN BLOOD: 6.2 %
IMMATURE GRANULOCYTES/100 LEUKOCYTES IN BLOOD BY AUTOMATED COUNT: 0.2 % (ref 0–0.9)
KETONES, URINE: NEGATIVE MG/DL
LEUKOCYTE ESTERASE, URINE: NEGATIVE
LEUKOCYTES (10*3/UL) IN BLOOD BY AUTOMATED COUNT: 9.3 X10E9/L (ref 4.4–11.3)
LIPASE (U/L) IN SER/PLAS: 66 U/L (ref 9–82)
LYMPHOCYTES (10*3/UL) IN BLOOD BY AUTOMATED COUNT: 2.57 X10E9/L (ref 1.2–4.8)
LYMPHOCYTES/100 LEUKOCYTES IN BLOOD BY AUTOMATED COUNT: 27.5 % (ref 13–44)
MONOCYTES (10*3/UL) IN BLOOD BY AUTOMATED COUNT: 0.6 X10E9/L (ref 0.1–1)
MONOCYTES/100 LEUKOCYTES IN BLOOD BY AUTOMATED COUNT: 6.4 % (ref 2–10)
NEUTROPHILS (10*3/UL) IN BLOOD BY AUTOMATED COUNT: 5.56 X10E9/L (ref 1.2–7.7)
NEUTROPHILS/100 LEUKOCYTES IN BLOOD BY AUTOMATED COUNT: 59.6 % (ref 40–80)
NITRITE, URINE: NEGATIVE
NRBC (PER 100 WBCS) BY AUTOMATED COUNT: 0 /100 WBC (ref 0–0)
PH, URINE: 5 (ref 5–8)
PLATELETS (10*3/UL) IN BLOOD AUTOMATED COUNT: 328 X10E9/L (ref 150–450)
POTASSIUM (MMOL/L) IN SER/PLAS: 5.1 MMOL/L (ref 3.5–5.3)
PROTEIN TOTAL: 6.7 G/DL (ref 6.4–8.2)
PROTEIN, URINE: NEGATIVE MG/DL
SODIUM (MMOL/L) IN SER/PLAS: 137 MMOL/L (ref 136–145)
SPECIFIC GRAVITY, URINE: 1.01 (ref 1–1.03)
UREA NITROGEN (MG/DL) IN SER/PLAS: 34 MG/DL (ref 6–23)
UROBILINOGEN, URINE: <2 MG/DL (ref 0–1.9)

## 2023-07-31 PROCEDURE — 81003 URINALYSIS AUTO W/O SCOPE: CPT

## 2023-07-31 PROCEDURE — 99215 OFFICE O/P EST HI 40 MIN: CPT | Performed by: FAMILY MEDICINE

## 2023-07-31 PROCEDURE — 4010F ACE/ARB THERAPY RXD/TAKEN: CPT | Performed by: FAMILY MEDICINE

## 2023-07-31 PROCEDURE — 3074F SYST BP LT 130 MM HG: CPT | Performed by: FAMILY MEDICINE

## 2023-07-31 PROCEDURE — 80053 COMPREHEN METABOLIC PANEL: CPT

## 2023-07-31 PROCEDURE — 87086 URINE CULTURE/COLONY COUNT: CPT

## 2023-07-31 PROCEDURE — 82652 VIT D 1 25-DIHYDROXY: CPT

## 2023-07-31 PROCEDURE — 82150 ASSAY OF AMYLASE: CPT

## 2023-07-31 PROCEDURE — 83036 HEMOGLOBIN GLYCOSYLATED A1C: CPT

## 2023-07-31 PROCEDURE — 36415 COLL VENOUS BLD VENIPUNCTURE: CPT

## 2023-07-31 PROCEDURE — 83690 ASSAY OF LIPASE: CPT

## 2023-07-31 PROCEDURE — 82043 UR ALBUMIN QUANTITATIVE: CPT

## 2023-07-31 PROCEDURE — 82570 ASSAY OF URINE CREATININE: CPT

## 2023-07-31 PROCEDURE — 3066F NEPHROPATHY DOC TX: CPT | Performed by: FAMILY MEDICINE

## 2023-07-31 PROCEDURE — 1036F TOBACCO NON-USER: CPT | Performed by: FAMILY MEDICINE

## 2023-07-31 PROCEDURE — 3008F BODY MASS INDEX DOCD: CPT | Performed by: FAMILY MEDICINE

## 2023-07-31 PROCEDURE — 85025 COMPLETE CBC W/AUTO DIFF WBC: CPT

## 2023-07-31 PROCEDURE — 3044F HG A1C LEVEL LT 7.0%: CPT | Performed by: FAMILY MEDICINE

## 2023-07-31 PROCEDURE — 3078F DIAST BP <80 MM HG: CPT | Performed by: FAMILY MEDICINE

## 2023-07-31 RX ORDER — CANDESARTAN 8 MG/1
4 TABLET ORAL DAILY
Qty: 90 TABLET | Refills: 2 | Status: SHIPPED | OUTPATIENT
Start: 2023-07-31 | End: 2024-01-17

## 2023-07-31 ASSESSMENT — PAIN SCALES - GENERAL: PAINLEVEL: 0-NO PAIN

## 2023-07-31 NOTE — PROGRESS NOTES
Subjective   Patient ID: Harpal Covarrubias is a 63 y.o. male who presents for Hospital Follow-up.  HPI  Recent UTI -completed antibiotic course without complication  No recent UTI symptoms such as frequency or pain or sign of blood noted  Still with issues with bowel - irregular   Taking Benefiber and Miralax and probiotic  No gas or bloating - no blood in stool   DM mgmt - seeing Dr Banda - they are not covering his CGM - had stopped his Toujeo and Ozempic recently and has been getting some higher readings  No longer using insulin - no sig high glucose sxs  Has been getting lightheaded when getting up from a chair - no heart racing or irregular element to his pulse  No chest pain or significant shortness of breath -no significant activity intolerance noted otherwise  Had previously reduced his metoprolol - also on ARB    Review of Systems  Essentially noncontributory otherwise    Objective   BP (!) 90/48 (BP Location: Left arm, Patient Position: Sitting, BP Cuff Size: Adult)   Pulse 76   Temp 36.7 °C (98 °F) (Temporal)   Wt 86.2 kg (190 lb)   SpO2 94%   BMI 27.26 kg/m²     Physical Exam  General: No acute distress, appears well  HEENT: No scleral icterus or conjunctival injection  Cardiac: Regular rate and rhythm, normal S1 and S2 without S3 or S4 or murmur  Lungs: Clear to auscultation bilaterally, no crackles or wheezes  Abdomen: Hyperactive bowel sounds, nontender nondistended, no organomegaly  Extremities: No extremity edema, good peripheral pulses  Assessment/Plan   Problem List Items Addressed This Visit       CKD stage 2 due to type 2 diabetes mellitus (CMS/[facility])    Relevant Orders    Comprehensive Metabolic Panel    Magnesium    Symptomatic hypotension    Relevant Orders    Magnesium     Other Visit Diagnoses       Urinary tract infection with hematuria, site unspecified    -  Primary    Relevant Orders    Urinalysis with Reflex Microscopic    Urine Culture    CBC and Auto Differential    Generalized  abdominal pain        Relevant Orders    Sedimentation Rate    C-Reactive Protein        1) Labs as ordered  2) follow-up today with Dr. Banda as planned and will have his A1c checked  3) concerns for hypotension, will cut his candesartan in half  4) still with some GI struggles, encouraged to continue Benefiber but stop the probiotic

## 2023-08-01 LAB — URINE CULTURE: NORMAL

## 2023-08-02 LAB — VITAMIN D 1,25-DIHYDROXY: 27.5 PG/ML (ref 19.9–79.3)

## 2023-08-04 ENCOUNTER — TELEPHONE (OUTPATIENT)
Dept: PRIMARY CARE | Facility: CLINIC | Age: 63
End: 2023-08-04
Payer: COMMERCIAL

## 2023-08-04 NOTE — TELEPHONE ENCOUNTER
Had called the other day and discussed results with patient  Overall very reassuring although his kidney function does not look as good but really may have been hydration issue - which he suspects really is part of the problem that he did not have a lot of water before his blood draw  No UTI - do not feel needs urol at this point  Otherwise sought to reassure him and would plan on follow-up in roughly 6 to 12 weeks  He is feeling pretty well otherwise and just purchased another e-bike

## 2023-10-24 ENCOUNTER — PATIENT MESSAGE (OUTPATIENT)
Dept: PRIMARY CARE | Facility: CLINIC | Age: 63
End: 2023-10-24
Payer: COMMERCIAL

## 2023-10-24 DIAGNOSIS — R53.83 OTHER FATIGUE: ICD-10-CM

## 2023-10-24 DIAGNOSIS — R33.9 URINARY RETENTION: Primary | ICD-10-CM

## 2023-10-24 DIAGNOSIS — E11.22 CKD STAGE 2 DUE TO TYPE 2 DIABETES MELLITUS (MULTI): ICD-10-CM

## 2023-10-24 DIAGNOSIS — N18.31 STAGE 3A CHRONIC KIDNEY DISEASE (MULTI): ICD-10-CM

## 2023-10-24 DIAGNOSIS — E11.9 DIABETES MELLITUS TYPE II, NON INSULIN DEPENDENT (MULTI): Primary | ICD-10-CM

## 2023-10-24 DIAGNOSIS — N18.2 CKD STAGE 2 DUE TO TYPE 2 DIABETES MELLITUS (MULTI): ICD-10-CM

## 2023-10-24 DIAGNOSIS — I10 ESSENTIAL HYPERTENSION: ICD-10-CM

## 2023-10-27 DIAGNOSIS — R35.0 URINARY FREQUENCY: Primary | ICD-10-CM

## 2023-10-30 ENCOUNTER — LAB (OUTPATIENT)
Dept: LAB | Facility: LAB | Age: 63
End: 2023-10-30
Payer: COMMERCIAL

## 2023-10-30 DIAGNOSIS — R35.0 URINARY FREQUENCY: ICD-10-CM

## 2023-10-30 DIAGNOSIS — N18.31 STAGE 3A CHRONIC KIDNEY DISEASE (MULTI): ICD-10-CM

## 2023-10-30 DIAGNOSIS — I10 ESSENTIAL HYPERTENSION: ICD-10-CM

## 2023-10-30 DIAGNOSIS — E11.9 DIABETES MELLITUS TYPE II, NON INSULIN DEPENDENT (MULTI): ICD-10-CM

## 2023-10-30 DIAGNOSIS — R53.83 OTHER FATIGUE: ICD-10-CM

## 2023-10-30 DIAGNOSIS — R33.9 URINARY RETENTION: ICD-10-CM

## 2023-10-30 LAB
ALBUMIN SERPL BCP-MCNC: 4.5 G/DL (ref 3.4–5)
ALP SERPL-CCNC: 72 U/L (ref 33–136)
ALT SERPL W P-5'-P-CCNC: 22 U/L (ref 10–52)
ANION GAP SERPL CALC-SCNC: 14 MMOL/L (ref 10–20)
APPEARANCE UR: CLEAR
AST SERPL W P-5'-P-CCNC: 21 U/L (ref 9–39)
BASOPHILS # BLD AUTO: 0.11 X10*3/UL (ref 0–0.1)
BASOPHILS NFR BLD AUTO: 1.3 %
BILIRUB SERPL-MCNC: 0.5 MG/DL (ref 0–1.2)
BILIRUB UR STRIP.AUTO-MCNC: NEGATIVE MG/DL
BUN SERPL-MCNC: 18 MG/DL (ref 6–23)
CALCIUM SERPL-MCNC: 9.8 MG/DL (ref 8.6–10.6)
CHLORIDE SERPL-SCNC: 102 MMOL/L (ref 98–107)
CHOLEST SERPL-MCNC: 108 MG/DL (ref 0–199)
CHOLESTEROL/HDL RATIO: 2.1
CO2 SERPL-SCNC: 30 MMOL/L (ref 21–32)
COLOR UR: YELLOW
CREAT SERPL-MCNC: 0.97 MG/DL (ref 0.5–1.3)
CREAT UR-MCNC: 53 MG/DL (ref 20–370)
CRP SERPL HS-MCNC: 0.5 MG/L
EOSINOPHIL # BLD AUTO: 0.38 X10*3/UL (ref 0–0.7)
EOSINOPHIL NFR BLD AUTO: 4.4 %
ERYTHROCYTE [DISTWIDTH] IN BLOOD BY AUTOMATED COUNT: 13.3 % (ref 11.5–14.5)
EST. AVERAGE GLUCOSE BLD GHB EST-MCNC: 114 MG/DL
GFR SERPL CREATININE-BSD FRML MDRD: 87 ML/MIN/1.73M*2
GLUCOSE SERPL-MCNC: 86 MG/DL (ref 74–99)
GLUCOSE UR STRIP.AUTO-MCNC: ABNORMAL MG/DL
HBA1C MFR BLD: 5.6 %
HCT VFR BLD AUTO: 50.1 % (ref 41–52)
HDLC SERPL-MCNC: 52.3 MG/DL
HGB BLD-MCNC: 15.9 G/DL (ref 13.5–17.5)
IMM GRANULOCYTES # BLD AUTO: 0.01 X10*3/UL (ref 0–0.7)
IMM GRANULOCYTES NFR BLD AUTO: 0.1 % (ref 0–0.9)
KETONES UR STRIP.AUTO-MCNC: NEGATIVE MG/DL
LDLC SERPL CALC-MCNC: 41 MG/DL
LEUKOCYTE ESTERASE UR QL STRIP.AUTO: NEGATIVE
LYMPHOCYTES # BLD AUTO: 2.3 X10*3/UL (ref 1.2–4.8)
LYMPHOCYTES NFR BLD AUTO: 26.5 %
MAGNESIUM SERPL-MCNC: 2.02 MG/DL (ref 1.6–2.4)
MCH RBC QN AUTO: 29.2 PG (ref 26–34)
MCHC RBC AUTO-ENTMCNC: 31.7 G/DL (ref 32–36)
MCV RBC AUTO: 92 FL (ref 80–100)
MICROALBUMIN UR-MCNC: 26.6 MG/L
MICROALBUMIN/CREAT UR: 50.2 UG/MG CREAT
MONOCYTES # BLD AUTO: 0.55 X10*3/UL (ref 0.1–1)
MONOCYTES NFR BLD AUTO: 6.3 %
NEUTROPHILS # BLD AUTO: 5.34 X10*3/UL (ref 1.2–7.7)
NEUTROPHILS NFR BLD AUTO: 61.4 %
NITRITE UR QL STRIP.AUTO: NEGATIVE
NON HDL CHOLESTEROL: 56 MG/DL (ref 0–149)
NRBC BLD-RTO: 0 /100 WBCS (ref 0–0)
PH UR STRIP.AUTO: 6 [PH]
PLATELET # BLD AUTO: 228 X10*3/UL (ref 150–450)
PMV BLD AUTO: 9.6 FL (ref 7.5–11.5)
POTASSIUM SERPL-SCNC: 4.5 MMOL/L (ref 3.5–5.3)
PROT SERPL-MCNC: 7.1 G/DL (ref 6.4–8.2)
PROT UR STRIP.AUTO-MCNC: NEGATIVE MG/DL
RBC # BLD AUTO: 5.45 X10*6/UL (ref 4.5–5.9)
RBC # UR STRIP.AUTO: NEGATIVE /UL
SODIUM SERPL-SCNC: 141 MMOL/L (ref 136–145)
SP GR UR STRIP.AUTO: 1.01
TRIGL SERPL-MCNC: 73 MG/DL (ref 0–149)
TSH SERPL-ACNC: 1.25 MIU/L (ref 0.44–3.98)
UROBILINOGEN UR STRIP.AUTO-MCNC: <2 MG/DL
VLDL: 15 MG/DL (ref 0–40)
WBC # BLD AUTO: 8.7 X10*3/UL (ref 4.4–11.3)

## 2023-10-30 PROCEDURE — 80053 COMPREHEN METABOLIC PANEL: CPT

## 2023-10-30 PROCEDURE — 84443 ASSAY THYROID STIM HORMONE: CPT

## 2023-10-30 PROCEDURE — 81003 URINALYSIS AUTO W/O SCOPE: CPT

## 2023-10-30 PROCEDURE — 84153 ASSAY OF PSA TOTAL: CPT

## 2023-10-30 PROCEDURE — 82043 UR ALBUMIN QUANTITATIVE: CPT

## 2023-10-30 PROCEDURE — 84154 ASSAY OF PSA FREE: CPT

## 2023-10-30 PROCEDURE — 83036 HEMOGLOBIN GLYCOSYLATED A1C: CPT

## 2023-10-30 PROCEDURE — 85025 COMPLETE CBC W/AUTO DIFF WBC: CPT

## 2023-10-30 PROCEDURE — 82570 ASSAY OF URINE CREATININE: CPT

## 2023-10-30 PROCEDURE — 83735 ASSAY OF MAGNESIUM: CPT

## 2023-10-30 PROCEDURE — 80061 LIPID PANEL: CPT

## 2023-10-30 PROCEDURE — 36415 COLL VENOUS BLD VENIPUNCTURE: CPT

## 2023-10-30 PROCEDURE — 86141 C-REACTIVE PROTEIN HS: CPT

## 2023-11-01 ENCOUNTER — OFFICE VISIT (OUTPATIENT)
Dept: CARDIOLOGY | Facility: CLINIC | Age: 63
End: 2023-11-01
Payer: COMMERCIAL

## 2023-11-01 VITALS
HEART RATE: 77 BPM | DIASTOLIC BLOOD PRESSURE: 60 MMHG | WEIGHT: 192.7 LBS | HEIGHT: 72 IN | OXYGEN SATURATION: 98 % | SYSTOLIC BLOOD PRESSURE: 125 MMHG | BODY MASS INDEX: 26.1 KG/M2

## 2023-11-01 DIAGNOSIS — I25.10 CORONARY ARTERY DISEASE INVOLVING NATIVE CORONARY ARTERY OF NATIVE HEART WITHOUT ANGINA PECTORIS: Primary | ICD-10-CM

## 2023-11-01 DIAGNOSIS — Z79.4 TYPE 2 DIABETES MELLITUS WITH OTHER CIRCULATORY COMPLICATION, WITH LONG-TERM CURRENT USE OF INSULIN (MULTI): ICD-10-CM

## 2023-11-01 DIAGNOSIS — I10 ESSENTIAL HYPERTENSION: ICD-10-CM

## 2023-11-01 DIAGNOSIS — E11.59 TYPE 2 DIABETES MELLITUS WITH OTHER CIRCULATORY COMPLICATION, WITH LONG-TERM CURRENT USE OF INSULIN (MULTI): ICD-10-CM

## 2023-11-01 DIAGNOSIS — N18.2 CKD STAGE 2 DUE TO TYPE 2 DIABETES MELLITUS (MULTI): ICD-10-CM

## 2023-11-01 DIAGNOSIS — E11.22 CKD STAGE 2 DUE TO TYPE 2 DIABETES MELLITUS (MULTI): ICD-10-CM

## 2023-11-01 DIAGNOSIS — I21.4 NON-ST ELEVATION MYOCARDIAL INFARCTION (NSTEMI), SUBSEQUENT EPISODE OF CARE (MULTI): ICD-10-CM

## 2023-11-01 PROBLEM — E66.9 OBESITY: Status: RESOLVED | Noted: 2023-03-04 | Resolved: 2023-11-01

## 2023-11-01 PROBLEM — N18.31 STAGE 3A CHRONIC KIDNEY DISEASE (MULTI): Status: RESOLVED | Noted: 2023-03-04 | Resolved: 2023-11-01

## 2023-11-01 PROBLEM — E66.812 OBESITY, CLASS II, BMI 35-39.9: Status: RESOLVED | Noted: 2022-04-11 | Resolved: 2023-11-01

## 2023-11-01 PROBLEM — E66.9 OBESITY, CLASS II, BMI 35-39.9: Status: RESOLVED | Noted: 2022-04-11 | Resolved: 2023-11-01

## 2023-11-01 LAB
PSA FREE MFR SERPL: 29 %
PSA FREE SERPL-MCNC: 0.6 NG/ML
PSA SERPL IA-MCNC: 2.1 NG/ML (ref 0–4)

## 2023-11-01 PROCEDURE — 3074F SYST BP LT 130 MM HG: CPT | Performed by: INTERNAL MEDICINE

## 2023-11-01 PROCEDURE — 1036F TOBACCO NON-USER: CPT | Performed by: INTERNAL MEDICINE

## 2023-11-01 PROCEDURE — 99215 OFFICE O/P EST HI 40 MIN: CPT | Performed by: INTERNAL MEDICINE

## 2023-11-01 PROCEDURE — 4010F ACE/ARB THERAPY RXD/TAKEN: CPT | Performed by: INTERNAL MEDICINE

## 2023-11-01 PROCEDURE — 3061F NEG MICROALBUMINURIA REV: CPT | Performed by: INTERNAL MEDICINE

## 2023-11-01 PROCEDURE — 3066F NEPHROPATHY DOC TX: CPT | Performed by: INTERNAL MEDICINE

## 2023-11-01 PROCEDURE — 3048F LDL-C <100 MG/DL: CPT | Performed by: INTERNAL MEDICINE

## 2023-11-01 PROCEDURE — 3044F HG A1C LEVEL LT 7.0%: CPT | Performed by: INTERNAL MEDICINE

## 2023-11-01 PROCEDURE — 3008F BODY MASS INDEX DOCD: CPT | Performed by: INTERNAL MEDICINE

## 2023-11-01 PROCEDURE — 3078F DIAST BP <80 MM HG: CPT | Performed by: INTERNAL MEDICINE

## 2023-11-01 ASSESSMENT — ENCOUNTER SYMPTOMS
DEPRESSION: 0
OCCASIONAL FEELINGS OF UNSTEADINESS: 0
LOSS OF SENSATION IN FEET: 0

## 2023-11-01 ASSESSMENT — PATIENT HEALTH QUESTIONNAIRE - PHQ9
1. LITTLE INTEREST OR PLEASURE IN DOING THINGS: NOT AT ALL
SUM OF ALL RESPONSES TO PHQ9 QUESTIONS 1 AND 2: 0
2. FEELING DOWN, DEPRESSED OR HOPELESS: NOT AT ALL

## 2023-11-01 NOTE — PROGRESS NOTES
[facility] (Formerly [facility], later [facility]) Note    Reason for Visit: Diabetes, Hyperlipidemia, and Coronary Artery Disease.  Referring Clinician: No ref. provider found     History of Present Illness: Mr. Covarrubias is a 63-year-old gentleman with coronary artery disease status post non-ST elevation myocardial infarction (100% occlusion of proximal to mid left circumflex with mild to moderate residual disease in his other vessels) complicated by mildly depressed LVEF of 45 to 50%, type 2 diabetes mellitus, chronic kidney disease stage III (eGFR now resolved to normal) with microalbuminuria hypertension, hypercholesterolemia, and obesity who was referred by Dr. Collins to the [facility] cardiometabolic clinic for cardio metabolic risk factor optimization and risk reduction and here for follow-up visit. He is doing very well since his initial visit currently tolerating excellent secondary prevention medical therapy including low-dose aspirin, prasugrel, ARB, beta-blocker, SGLT2 inhibitor, high intensity statin, and GLP-1 receptor agonist.  He is on Toujeo insulin and his A1c improved down to 5.6% without any significant hypoglycemia.  His CGM shows 97% time in target range with 2% high and 1% low with average glucose 124 mg/dL, GMI 6.3%, glucose variability 20.4%.  His cholesterol is at optimal control with an LDL cholesterol of 41 and triglycerides of 73.  His blood pressure is currently well controlled.  He feels very well and has not had any recurrent angina, shortness of breath, palpitations, or syncope. He is not having any major adverse effects from his medications. His most recent labs showed eGFR 87, potassium of 4.5, and UACR of 50.    Past Medical History:  Coronary artery disease  Type 2 diabetes mellitus  Dyslipidemia  Obesity  CKD 3    Past Surgical History:  He has a past surgical history that includes Other surgical history (10/14/2022) and Other surgical history  "(10/14/2022).    Social History:  He reports that he has never smoked. He has never been exposed to tobacco smoke. He has never used smokeless tobacco. He reports that he does not currently use alcohol. He reports that he does not currently use drugs.    Family History:  No family history on file.    Allergies:  Nitroglycerin    Outpatient Medications:  Current Outpatient Medications   Medication Instructions    aspirin 81 mg EC tablet 1 tablet, oral, Daily    blood sugar diagnostic (OneTouch Verio test strips) strip Use to test sugars twice a day    candesartan (ATACAND) 4 mg, oral, Daily    cholecalciferol (Vitamin D-3) 1,250 mcg (50,000 unit) capsule 1 capsule, oral, Weekly    empagliflozin (JARDIANCE) 25 mg, oral, Daily    FreeStyle Ashley 3 Sensor device     insulin lispro (HUMALOG)  Units, subcutaneous, PER USE OF SLIDING SCALE PRIOR TO MEALS    lansoprazole (PREVACID) 30 mg, oral, 2 times daily before meals, 30 MINUTES PRIOR A MEAL. Do not crush or chew.    metoprolol succinate XL (TOPROL-XL) 12.5 mg, oral, Daily, Do not crush or chew.     pen needle, diabetic 31 gauge x 5/16\" needle miscellaneous, 4 times daily    prasugrel (EFFIENT) 10 mg, oral, Daily    rosuvastatin (CRESTOR) 40 mg, oral, Daily    semaglutide (OZEMPIC) 1 mg, subcutaneous, Weekly    Toujeo Max U-300 SoloStar 36 Units, subcutaneous, Nightly       Review of Systems:  ROS    Last Recorded Vitals:  Vitals:    11/01/23 0831   BP: 125/60   BP Location: Right arm   Patient Position: Sitting   BP Cuff Size: Large adult   Pulse: 77   SpO2: 98%   Weight: 87.4 kg (192 lb 11.2 oz)   Height: 1.829 m (6')       Physical Examination:  General: Well appearing, well-nourished, in no acute distress.  HEENT: Normocephalic atraumatic, pupils equal and reactive to light, extraocular muscles intact, no conjunctival injection, oropharynx clear without exudates.  Neck: Normal carotid arterial pulses, no arterial bruits, no thyromegaly.  Cardiac: Regular " "rhythm and normal heart rate.  S1, S2 present and normal.  No murmurs, rubs, or gallops.  PMI is nondisplaced. Jugular venous pulsations are normal.  Pulmonary: Normal breath sounds, no increased work of breathing, no wheezes or crackles.  GI: Normal bowel sounds, abdominal aorta not enlarged, no hepatosplenomegaly, no abdominal bruits.  Lower extremities: No cyanosis, clubbing, or edema.  No xanthelasma present. Normal distal pulses.  Skin: Skin intact. No significant rashes or lesions present.  Neuro: Alert and oriented x 3, normal attention and cognition, no focal motor or sensory neurologic deficits.  Psych: Normal affect and mood.  Musculoskeletal: Normal gait normal muscle tone.    Laboratory Studies:  Lab Results   Component Value Date    GLUCOSE 86 10/30/2023    CALCIUM 9.8 10/30/2023     10/30/2023    K 4.5 10/30/2023    CO2 30 10/30/2023     10/30/2023    BUN 18 10/30/2023    CREATININE 0.97 10/30/2023     Lab Results   Component Value Date    ALT 22 10/30/2023    AST 21 10/30/2023    ALKPHOS 72 10/30/2023    BILITOT 0.5 10/30/2023     Results from last 7 days   Lab Units 10/30/23  1242   CHOLESTEROL mg/dL 108   TRIGLYCERIDES mg/dL 73   HDL mg/dL 52.3     Lab Results   Component Value Date    CHOL 108 10/30/2023    CHOL 82 06/15/2023    CHOL 103 04/17/2023     Lab Results   Component Value Date    HDL 52.3 10/30/2023    HDL 36.0 (A) 06/15/2023    HDL 38.0 (A) 04/17/2023     Lab Results   Component Value Date    LDLCALC 41 10/30/2023     Lab Results   Component Value Date    TRIG 73 10/30/2023    TRIG 111 06/15/2023    TRIG 104 04/17/2023     No components found for: \"CHOLHDL\"  Lab Results   Component Value Date    HGBA1C 5.6 10/30/2023     UACR 50    Assessment and Plan:  Problem List Items Addressed This Visit          Cardiac and Vasculature    Essential hypertension    Coronary artery disease - Primary    Non-ST elevation myocardial infarction (NSTEMI), subsequent episode of care (CMS/[facility]) "       Genitourinary and Reproductive    CKD stage 2 due to type 2 diabetes mellitus (CMS/[facility])     Other Visit Diagnoses       Type 2 diabetes mellitus with other circulatory complication, with long-term current use of insulin (CMS/[facility])              Mr. Covarrubias is a 63-year-old gentleman with coronary artery disease status post non-ST elevation myocardial infarction (100% occlusion of proximal to mid left circumflex with mild to moderate residual disease in his other vessels) complicated by mildly depressed LVEF of 45 to 50%, type 2 diabetes mellitus, chronic kidney disease stage III (eGFR now resolved to normal) with microalbuminuria hypertension, hypercholesterolemia, and obesity who [facility] cardio metabolic risk factor optimization and risk reduction and here for follow-up visit. He is doing very well since his initial visit currently tolerating excellent secondary prevention medical therapy including low-dose aspirin, prasugrel, ARB, beta-blocker, SGLT2 inhibitor, high intensity statin, and GLP-1 receptor agonist.  He is on Toujeo insulin and his A1c improved down to 5.6% without any significant hypoglycemia.  His CGM shows 97% time in target range with 2% high and 1% low with average glucose 124 mg/dL, GMI 6.3%, glucose variability 20.4%.  His cholesterol is at optimal control with an LDL cholesterol of 41 and triglycerides of 73.  His blood pressure is currently well controlled.  He feels very well and has not had any recurrent angina, shortness of breath, palpitations, or syncope. He is not having any major adverse effects from his medications. His most recent labs showed eGFR 87, potassium of 4.5, and UACR of 50. Overall he is on excellent medical therapy and he is doing very well from a lifestyle standpoint. I will see him again in 6 months here in the office but we will follow-up with him sooner regarding the lab results. Please do not hesitate to  call or contact me with questions or concerns      I spent 40 minutes of face-to-face time with the patient, with more than 50% of that time spent in counseling and/or coordination of care.    Juan Whitfield MD, FADEBBIE, [facility]  Director,  [facility]  Director,  [facility]  Associate Professor of Medicine  [employer]

## 2023-11-11 DIAGNOSIS — K22.719 BARRETT'S ESOPHAGUS WITH DYSPLASIA: Primary | ICD-10-CM

## 2023-11-13 RX ORDER — LANSOPRAZOLE 30 MG/1
CAPSULE, DELAYED RELEASE ORAL
Qty: 180 CAPSULE | Refills: 1 | Status: SHIPPED | OUTPATIENT
Start: 2023-11-13

## 2024-01-04 ENCOUNTER — APPOINTMENT (OUTPATIENT)
Dept: CARDIOLOGY | Facility: HOSPITAL | Age: 64
End: 2024-01-04
Payer: COMMERCIAL

## 2024-01-09 DIAGNOSIS — I10 ESSENTIAL HYPERTENSION: Primary | ICD-10-CM

## 2024-01-09 DIAGNOSIS — E11.39 TYPE 2 DIABETES MELLITUS WITH OTHER OPHTHALMIC COMPLICATION, WITH LONG-TERM CURRENT USE OF INSULIN (MULTI): ICD-10-CM

## 2024-01-09 DIAGNOSIS — I25.10 CORONARY ARTERY DISEASE INVOLVING NATIVE CORONARY ARTERY OF NATIVE HEART WITHOUT ANGINA PECTORIS: ICD-10-CM

## 2024-01-09 DIAGNOSIS — Z79.4 TYPE 2 DIABETES MELLITUS WITH OTHER OPHTHALMIC COMPLICATION, WITH LONG-TERM CURRENT USE OF INSULIN (MULTI): ICD-10-CM

## 2024-01-12 ENCOUNTER — OFFICE VISIT (OUTPATIENT)
Dept: PRIMARY CARE | Facility: CLINIC | Age: 64
End: 2024-01-12
Payer: MEDICARE

## 2024-01-12 VITALS
SYSTOLIC BLOOD PRESSURE: 138 MMHG | DIASTOLIC BLOOD PRESSURE: 78 MMHG | TEMPERATURE: 98.7 F | HEART RATE: 83 BPM | OXYGEN SATURATION: 96 %

## 2024-01-12 DIAGNOSIS — Z86.39 HISTORY OF HYPERLIPIDEMIA: ICD-10-CM

## 2024-01-12 DIAGNOSIS — M70.41 PREPATELLAR BURSITIS OF RIGHT KNEE: Primary | ICD-10-CM

## 2024-01-12 DIAGNOSIS — R93.1 HIGH CORONARY ARTERY CALCIUM SCORE: ICD-10-CM

## 2024-01-12 DIAGNOSIS — I25.10 CORONARY ARTERY DISEASE INVOLVING NATIVE HEART WITHOUT ANGINA PECTORIS, UNSPECIFIED VESSEL OR LESION TYPE: ICD-10-CM

## 2024-01-12 PROCEDURE — 3075F SYST BP GE 130 - 139MM HG: CPT | Performed by: FAMILY MEDICINE

## 2024-01-12 PROCEDURE — 1036F TOBACCO NON-USER: CPT | Performed by: FAMILY MEDICINE

## 2024-01-12 PROCEDURE — 3066F NEPHROPATHY DOC TX: CPT | Performed by: FAMILY MEDICINE

## 2024-01-12 PROCEDURE — 99213 OFFICE O/P EST LOW 20 MIN: CPT | Performed by: FAMILY MEDICINE

## 2024-01-12 PROCEDURE — 4010F ACE/ARB THERAPY RXD/TAKEN: CPT | Performed by: FAMILY MEDICINE

## 2024-01-12 PROCEDURE — 3078F DIAST BP <80 MM HG: CPT | Performed by: FAMILY MEDICINE

## 2024-01-12 RX ORDER — ROSUVASTATIN CALCIUM 40 MG/1
40 TABLET, COATED ORAL DAILY
Qty: 90 TABLET | Refills: 3 | Status: SHIPPED | OUTPATIENT
Start: 2024-01-12 | End: 2024-06-11

## 2024-01-12 ASSESSMENT — PAIN SCALES - GENERAL: PAINLEVEL: 1

## 2024-01-12 NOTE — PROGRESS NOTES
Subjective   Patient ID: Harpal Covarrubias is a 63 y.o. male who presents for Knee Pain (Right knee pain, swelling).  HPI  Believes he injured his R knee playing with his dogs - kneeling down too hard or resting on it too much  No marked tenderness, no redness or warmth  Has not had this problem before except over an elbow  Some discomfort -  still swollen   No popping / clicking   No leg instability  He has not tried any compression or padding -has not changed his exercise habits, but has not been as physically active as of late as had sons in town    Review of Systems  Essentially noncontributory otherwise    Objective   /78 (BP Location: Left arm, Patient Position: Sitting, BP Cuff Size: Adult)   Pulse 83   Temp 37.1 °C (98.7 °F) (Temporal)   SpO2 96%     Physical Exam  General: No acute distress, appears well, ambulates without limp  Extremity: Right knee with significant swelling over patella without marked erythema or tenderness nor firmness -highly consistent with prepatellar bursitis  No posterior swelling as might be seen with Baker's cyst  Negative drawer test, no meniscal findings, no MCL/LCL laxity     Assessment/Plan   Problem List Items Addressed This Visit    None  Visit Diagnoses       Prepatellar bursitis of right knee    -  Primary        Sought to reassure  Encouraged him to continue with activity, but limit kneeling and consider use of compression sleeve and/or knee pad particularly if he is going to spend a lot of time on his knees playing with his dogs  Should he develop pain to let us know    TVT of 22 minutes with greater than 50% spent FTF in assessment/discussion and care coordination

## 2024-01-17 DIAGNOSIS — N18.2 CKD STAGE 2 DUE TO TYPE 2 DIABETES MELLITUS (MULTI): ICD-10-CM

## 2024-01-17 DIAGNOSIS — I10 ESSENTIAL HYPERTENSION: Primary | ICD-10-CM

## 2024-01-17 DIAGNOSIS — E11.22 CKD STAGE 2 DUE TO TYPE 2 DIABETES MELLITUS (MULTI): ICD-10-CM

## 2024-01-17 RX ORDER — CANDESARTAN 8 MG/1
8 TABLET ORAL DAILY
Qty: 90 TABLET | Refills: 3 | Status: SHIPPED | OUTPATIENT
Start: 2024-01-17

## 2024-01-17 RX ORDER — EMPAGLIFLOZIN 25 MG/1
25 TABLET, FILM COATED ORAL DAILY
Qty: 90 TABLET | Refills: 3 | Status: SHIPPED | OUTPATIENT
Start: 2024-01-17

## 2024-01-22 DIAGNOSIS — I10 ESSENTIAL HYPERTENSION: ICD-10-CM

## 2024-01-22 DIAGNOSIS — I21.4 NON-ST ELEVATION MYOCARDIAL INFARCTION (NSTEMI), SUBSEQUENT EPISODE OF CARE (MULTI): ICD-10-CM

## 2024-01-22 RX ORDER — METOPROLOL SUCCINATE 25 MG/1
TABLET, EXTENDED RELEASE ORAL
Qty: 45 TABLET | Refills: 3 | Status: SHIPPED | OUTPATIENT
Start: 2024-01-22 | End: 2024-04-25 | Stop reason: SDUPTHER

## 2024-01-24 ENCOUNTER — APPOINTMENT (OUTPATIENT)
Dept: PRIMARY CARE | Facility: CLINIC | Age: 64
End: 2024-01-24
Payer: MEDICARE

## 2024-01-31 ENCOUNTER — APPOINTMENT (OUTPATIENT)
Dept: PRIMARY CARE | Facility: CLINIC | Age: 64
End: 2024-01-31
Payer: MEDICARE

## 2024-02-06 ENCOUNTER — LAB (OUTPATIENT)
Dept: LAB | Facility: LAB | Age: 64
End: 2024-02-06
Payer: MEDICARE

## 2024-02-06 DIAGNOSIS — N18.2 CKD STAGE 2 DUE TO TYPE 2 DIABETES MELLITUS (MULTI): ICD-10-CM

## 2024-02-06 DIAGNOSIS — R10.84 GENERALIZED ABDOMINAL PAIN: ICD-10-CM

## 2024-02-06 DIAGNOSIS — Z79.4 TYPE 2 DIABETES MELLITUS WITH OTHER OPHTHALMIC COMPLICATION, WITH LONG-TERM CURRENT USE OF INSULIN (MULTI): ICD-10-CM

## 2024-02-06 DIAGNOSIS — I25.10 CORONARY ARTERY DISEASE INVOLVING NATIVE CORONARY ARTERY OF NATIVE HEART WITHOUT ANGINA PECTORIS: ICD-10-CM

## 2024-02-06 DIAGNOSIS — I95.9 SYMPTOMATIC HYPOTENSION: ICD-10-CM

## 2024-02-06 DIAGNOSIS — E11.22 CKD STAGE 2 DUE TO TYPE 2 DIABETES MELLITUS (MULTI): ICD-10-CM

## 2024-02-06 DIAGNOSIS — I10 ESSENTIAL HYPERTENSION: ICD-10-CM

## 2024-02-06 DIAGNOSIS — E11.39 TYPE 2 DIABETES MELLITUS WITH OTHER OPHTHALMIC COMPLICATION, WITH LONG-TERM CURRENT USE OF INSULIN (MULTI): ICD-10-CM

## 2024-02-06 LAB
ALBUMIN SERPL BCP-MCNC: 4.5 G/DL (ref 3.4–5)
ALP SERPL-CCNC: 78 U/L (ref 33–136)
ALT SERPL W P-5'-P-CCNC: 29 U/L (ref 10–52)
ANION GAP SERPL CALC-SCNC: 12 MMOL/L (ref 10–20)
AST SERPL W P-5'-P-CCNC: 23 U/L (ref 9–39)
BASOPHILS # BLD AUTO: 0.12 X10*3/UL (ref 0–0.1)
BASOPHILS NFR BLD AUTO: 1.3 %
BILIRUB SERPL-MCNC: 0.6 MG/DL (ref 0–1.2)
BUN SERPL-MCNC: 16 MG/DL (ref 6–23)
CALCIUM SERPL-MCNC: 10 MG/DL (ref 8.6–10.6)
CHLORIDE SERPL-SCNC: 104 MMOL/L (ref 98–107)
CHOLEST SERPL-MCNC: 107 MG/DL (ref 0–199)
CHOLESTEROL/HDL RATIO: 2.1
CO2 SERPL-SCNC: 31 MMOL/L (ref 21–32)
CREAT SERPL-MCNC: 1.22 MG/DL (ref 0.5–1.3)
CRP SERPL-MCNC: <0.1 MG/DL
EGFRCR SERPLBLD CKD-EPI 2021: 66 ML/MIN/1.73M*2
EOSINOPHIL # BLD AUTO: 0.39 X10*3/UL (ref 0–0.7)
EOSINOPHIL NFR BLD AUTO: 4.3 %
ERYTHROCYTE [DISTWIDTH] IN BLOOD BY AUTOMATED COUNT: 13.7 % (ref 11.5–14.5)
EST. AVERAGE GLUCOSE BLD GHB EST-MCNC: 134 MG/DL
GLUCOSE SERPL-MCNC: 111 MG/DL (ref 74–99)
HBA1C MFR BLD: 6.3 %
HCT VFR BLD AUTO: 52.7 % (ref 41–52)
HDLC SERPL-MCNC: 51.4 MG/DL
HGB BLD-MCNC: 17.1 G/DL (ref 13.5–17.5)
IMM GRANULOCYTES # BLD AUTO: 0.01 X10*3/UL (ref 0–0.7)
IMM GRANULOCYTES NFR BLD AUTO: 0.1 % (ref 0–0.9)
LDLC SERPL CALC-MCNC: 42 MG/DL
LYMPHOCYTES # BLD AUTO: 2.36 X10*3/UL (ref 1.2–4.8)
LYMPHOCYTES NFR BLD AUTO: 25.9 %
MAGNESIUM SERPL-MCNC: 2.09 MG/DL (ref 1.6–2.4)
MCH RBC QN AUTO: 29 PG (ref 26–34)
MCHC RBC AUTO-ENTMCNC: 32.4 G/DL (ref 32–36)
MCV RBC AUTO: 89 FL (ref 80–100)
MONOCYTES # BLD AUTO: 0.68 X10*3/UL (ref 0.1–1)
MONOCYTES NFR BLD AUTO: 7.5 %
NEUTROPHILS # BLD AUTO: 5.55 X10*3/UL (ref 1.2–7.7)
NEUTROPHILS NFR BLD AUTO: 60.9 %
NON HDL CHOLESTEROL: 56 MG/DL (ref 0–149)
NRBC BLD-RTO: 0 /100 WBCS (ref 0–0)
PLATELET # BLD AUTO: 225 X10*3/UL (ref 150–450)
POTASSIUM SERPL-SCNC: 4.8 MMOL/L (ref 3.5–5.3)
PROT SERPL-MCNC: 7 G/DL (ref 6.4–8.2)
RBC # BLD AUTO: 5.9 X10*6/UL (ref 4.5–5.9)
SODIUM SERPL-SCNC: 142 MMOL/L (ref 136–145)
TRIGL SERPL-MCNC: 70 MG/DL (ref 0–149)
VLDL: 14 MG/DL (ref 0–40)
WBC # BLD AUTO: 9.1 X10*3/UL (ref 4.4–11.3)

## 2024-02-06 PROCEDURE — 86140 C-REACTIVE PROTEIN: CPT

## 2024-02-06 PROCEDURE — 80053 COMPREHEN METABOLIC PANEL: CPT

## 2024-02-06 PROCEDURE — 85025 COMPLETE CBC W/AUTO DIFF WBC: CPT

## 2024-02-06 PROCEDURE — 80061 LIPID PANEL: CPT

## 2024-02-06 PROCEDURE — 83036 HEMOGLOBIN GLYCOSYLATED A1C: CPT

## 2024-02-06 PROCEDURE — 83735 ASSAY OF MAGNESIUM: CPT

## 2024-02-06 PROCEDURE — 36415 COLL VENOUS BLD VENIPUNCTURE: CPT

## 2024-02-08 ENCOUNTER — OFFICE VISIT (OUTPATIENT)
Dept: PRIMARY CARE | Facility: CLINIC | Age: 64
End: 2024-02-08
Payer: MEDICARE

## 2024-02-08 VITALS
SYSTOLIC BLOOD PRESSURE: 112 MMHG | TEMPERATURE: 98.5 F | OXYGEN SATURATION: 97 % | DIASTOLIC BLOOD PRESSURE: 68 MMHG | WEIGHT: 196.4 LBS | BODY MASS INDEX: 26.64 KG/M2 | HEART RATE: 85 BPM

## 2024-02-08 DIAGNOSIS — M70.41 PREPATELLAR BURSITIS OF RIGHT KNEE: Primary | ICD-10-CM

## 2024-02-08 DIAGNOSIS — R10.30 LOWER ABDOMINAL PAIN: ICD-10-CM

## 2024-02-08 DIAGNOSIS — N18.31 STAGE 3A CHRONIC KIDNEY DISEASE (MULTI): ICD-10-CM

## 2024-02-08 PROCEDURE — 1036F TOBACCO NON-USER: CPT | Performed by: FAMILY MEDICINE

## 2024-02-08 PROCEDURE — 99215 OFFICE O/P EST HI 40 MIN: CPT | Performed by: FAMILY MEDICINE

## 2024-02-08 PROCEDURE — 1159F MED LIST DOCD IN RCRD: CPT | Performed by: FAMILY MEDICINE

## 2024-02-08 PROCEDURE — 3078F DIAST BP <80 MM HG: CPT | Performed by: FAMILY MEDICINE

## 2024-02-08 PROCEDURE — 4010F ACE/ARB THERAPY RXD/TAKEN: CPT | Performed by: FAMILY MEDICINE

## 2024-02-08 PROCEDURE — 3044F HG A1C LEVEL LT 7.0%: CPT | Performed by: FAMILY MEDICINE

## 2024-02-08 PROCEDURE — 1126F AMNT PAIN NOTED NONE PRSNT: CPT | Performed by: FAMILY MEDICINE

## 2024-02-08 PROCEDURE — 3048F LDL-C <100 MG/DL: CPT | Performed by: FAMILY MEDICINE

## 2024-02-08 PROCEDURE — 3074F SYST BP LT 130 MM HG: CPT | Performed by: FAMILY MEDICINE

## 2024-02-08 ASSESSMENT — PAIN SCALES - GENERAL: PAINLEVEL: 0-NO PAIN

## 2024-02-08 NOTE — PROGRESS NOTES
Subjective   Patient ID: Harpal Covarrubias is a 64 y.o. male who presents for Follow-up.  HPI  1) in past months has done a little more indulging diet-wise   Past few weeks has had episodes of lower abd/pelvic pain   This AM had episode of vomiting - and diarrhea since then   No other constitutional symptoms   This AM had sig pain in central lower abdomen  No blood in stool or dark stool   Feels fine now    2) has had ongoing issues with R knee swelling   Feels has been from being down on his knees while playing with the dogs  No redness - ? Slightly warm   Not limiting pain-wise    3) recent kidney tests not as good - probably related to his recent dietary changes     Review of Systems  Essentially noncontributory otherwise    Objective   /68 (BP Location: Left arm, Patient Position: Sitting, BP Cuff Size: Adult)   Pulse 85   Temp 36.9 °C (98.5 °F) (Temporal)   Wt 89.1 kg (196 lb 6.4 oz)   SpO2 97%   BMI 26.64 kg/m²     Physical Exam  Gen: NAD - but a little emotional when discussing his transformation to better health  HEENT: no scleral icterus  Neck: no gross JVD or thyromegaly  Lungs: CTAB - no crackles or wheeze or cough  Cardiac: RRR, nl s1/s2 - no s3/s4 or murmur  Abd: soft, NTND, no HSM  Ext: good UE peripheral pulses, no LE edema but sig swelling over patella on R c/w bursitis and not infection/other  Assessment/Plan   Problem List Items Addressed This Visit    None  Visit Diagnoses       Prepatellar bursitis of right knee    -  Primary    Relevant Orders    Referral to Orthopaedic Surgery    Stage 3a chronic kidney disease (CMS/HCC)        Lower abdominal pain            1) would still encourage conservative measures for the knees such as avoiding kneeling, consider knee pad or sleeve -however is really making no progress with this would like assessment by Ortho  2) praised what he has done over time, strongly encouraged him to not be so self-critical -also to really question how much he needs to  devote to his work that comes at the expense of his health and wellbeing and simply arrive at a plan/timetable for what he wants to do going forward   3) GI disturbance, have to believe some of this is his dietary choices but also may be related to side effects of medication  Nothing overly disturbing other than the physical effects that he has to endure - follow    TVT of 45 minutes with greater than 50% spent FTF in assessment/discussion and care coordination

## 2024-02-19 ENCOUNTER — OFFICE VISIT (OUTPATIENT)
Dept: ORTHOPEDIC SURGERY | Facility: CLINIC | Age: 64
End: 2024-02-19
Payer: MEDICARE

## 2024-02-19 ENCOUNTER — HOSPITAL ENCOUNTER (OUTPATIENT)
Dept: RADIOLOGY | Facility: CLINIC | Age: 64
Discharge: HOME | End: 2024-02-19
Payer: MEDICARE

## 2024-02-19 VITALS — HEIGHT: 72 IN | WEIGHT: 196 LBS | BODY MASS INDEX: 26.55 KG/M2

## 2024-02-19 DIAGNOSIS — M70.41 PREPATELLAR BURSITIS OF RIGHT KNEE: ICD-10-CM

## 2024-02-19 DIAGNOSIS — S83.91XA SPRAIN OF RIGHT KNEE, UNSPECIFIED LIGAMENT, INITIAL ENCOUNTER: Primary | ICD-10-CM

## 2024-02-19 PROCEDURE — 20610 DRAIN/INJ JOINT/BURSA W/O US: CPT | Performed by: ORTHOPAEDIC SURGERY

## 2024-02-19 PROCEDURE — 73562 X-RAY EXAM OF KNEE 3: CPT | Mod: RIGHT SIDE | Performed by: RADIOLOGY

## 2024-02-19 PROCEDURE — 99204 OFFICE O/P NEW MOD 45 MIN: CPT | Performed by: ORTHOPAEDIC SURGERY

## 2024-02-19 PROCEDURE — 1160F RVW MEDS BY RX/DR IN RCRD: CPT | Performed by: ORTHOPAEDIC SURGERY

## 2024-02-19 PROCEDURE — 3044F HG A1C LEVEL LT 7.0%: CPT | Performed by: ORTHOPAEDIC SURGERY

## 2024-02-19 PROCEDURE — 1126F AMNT PAIN NOTED NONE PRSNT: CPT | Performed by: ORTHOPAEDIC SURGERY

## 2024-02-19 PROCEDURE — 1159F MED LIST DOCD IN RCRD: CPT | Performed by: ORTHOPAEDIC SURGERY

## 2024-02-19 PROCEDURE — 4010F ACE/ARB THERAPY RXD/TAKEN: CPT | Performed by: ORTHOPAEDIC SURGERY

## 2024-02-19 PROCEDURE — 1036F TOBACCO NON-USER: CPT | Performed by: ORTHOPAEDIC SURGERY

## 2024-02-19 PROCEDURE — 3048F LDL-C <100 MG/DL: CPT | Performed by: ORTHOPAEDIC SURGERY

## 2024-02-19 PROCEDURE — 73562 X-RAY EXAM OF KNEE 3: CPT | Mod: RT

## 2024-02-19 ASSESSMENT — PAIN SCALES - GENERAL: PAINLEVEL_OUTOF10: 2

## 2024-02-19 ASSESSMENT — PAIN - FUNCTIONAL ASSESSMENT: PAIN_FUNCTIONAL_ASSESSMENT: 0-10

## 2024-02-19 ASSESSMENT — PAIN DESCRIPTION - DESCRIPTORS: DESCRIPTORS: DULL

## 2024-02-19 NOTE — PROGRESS NOTES
Chief complaint is right knee swelling  No pain  Does not recall any specific trauma that may be related to take care of his dogs  He is on blood thinner for recent MI  He has no fevers or chills  No prior knee problems    Past medical,family and social histories have been reviewed and are up to date.  All other body systems have been reviewed and are negative for complaint.  Constitutional: Well-developed well-nourished   Eyes: Sclerae anicteric, pupils equal and round  HENT: Normocephalic atraumatic  Cardiovascular: Pulses full, regular rate and rhythm  Respiratory: Breathing not labored, no wheezing  Integumentary: Skin intact, no lesions or rashes  Neurological: Sensation intact, no gross strength deficits, reflexes equal  Psychiatric: Alert oriented and appropriate  Hematologic/lymphatic: No lymphadenopathy  Right knee: Large fluid collection in the prepatellar bursa there is no knee effusion skin is intact and there is no redness  X-rays of the knee are essentially negative small calcification at the tip of the patella  Impression traumatic bursitis  Aspirated 70 cc of bloody fluid applied compressive wrap recommended activity modification icing discussed possible need for repeat aspiration and/or bursectomy  All questions answered  L Inj/Asp: R supra patellar bursa on 2/19/2024 3:27 PM  Indications: joint swelling  Details: 18 G needle, lateral approach  Aspirate: 70 mL bloody

## 2024-03-04 DIAGNOSIS — E11.3293 MILD NONPROLIFERATIVE DIABETIC RETINOPATHY OF BOTH EYES ASSOCIATED WITH TYPE 2 DIABETES MELLITUS, MACULAR EDEMA PRESENCE UNSPECIFIED (MULTI): ICD-10-CM

## 2024-03-04 RX ORDER — SEMAGLUTIDE 1.34 MG/ML
INJECTION, SOLUTION SUBCUTANEOUS
Qty: 9 ML | Refills: 3 | Status: SHIPPED | OUTPATIENT
Start: 2024-03-04

## 2024-03-28 ENCOUNTER — TELEPHONE (OUTPATIENT)
Dept: PRIMARY CARE | Facility: CLINIC | Age: 64
End: 2024-03-28
Payer: MEDICARE

## 2024-03-28 NOTE — TELEPHONE ENCOUNTER
----- Message from Harpalitana Covarrubias sent at 3/28/2024  5:06 AM EDT -----  Regarding: GI Related Issue  Contact: [phone]  Good Morning Anthony,  Long time no talk to … I hope all is well with you & yours!  I sent you a text the other day…Not sure if you received it?  I’m back to traveling again and have been home about a week now and have two of my kids home.  They actually fly back this weekend and I fly out again on Monday.  I have a GI related issue (I think), which is concerning me a little…Over the weekend I was having some stomach(lower abdominal) pain and cramping … Followed by nausea and diarrhea (which lasted a couple of days).  Since (Sunday/Monday) it has stopped, though the concern is that I have been constipated since (no Bowl movement) since Sunday/Monday.  Do you feel this is something I should be concerned about?  Should I take a laxative (ie…something like “Colace”)?  I’m not sure If I should even fly out Monday (if I don’t have a bowel movement by then).  Thoughts/Recommendations?  Best,  Harpal

## 2024-03-28 NOTE — TELEPHONE ENCOUNTER
Called back and discussed   No pain and no bloating   Would recommend cont to hold Ozempic  Colace would be fine - consider MOM if not better (effective BM)  by tomorrow

## 2024-04-15 ENCOUNTER — OFFICE VISIT (OUTPATIENT)
Dept: ORTHOPEDIC SURGERY | Facility: CLINIC | Age: 64
End: 2024-04-15
Payer: MEDICARE

## 2024-04-15 VITALS — WEIGHT: 196 LBS | BODY MASS INDEX: 25.98 KG/M2 | HEIGHT: 73 IN

## 2024-04-15 DIAGNOSIS — Z86.79 HISTORY OF ATRIAL FIBRILLATION: ICD-10-CM

## 2024-04-15 DIAGNOSIS — S83.91XA SPRAIN OF RIGHT KNEE, UNSPECIFIED LIGAMENT, INITIAL ENCOUNTER: ICD-10-CM

## 2024-04-15 DIAGNOSIS — I10 ESSENTIAL HYPERTENSION: ICD-10-CM

## 2024-04-15 DIAGNOSIS — I44.0 HEART BLOCK AV FIRST DEGREE: ICD-10-CM

## 2024-04-15 DIAGNOSIS — M70.41 PREPATELLAR BURSITIS OF RIGHT KNEE: Primary | ICD-10-CM

## 2024-04-15 PROCEDURE — 1159F MED LIST DOCD IN RCRD: CPT | Performed by: ORTHOPAEDIC SURGERY

## 2024-04-15 PROCEDURE — 3044F HG A1C LEVEL LT 7.0%: CPT | Performed by: ORTHOPAEDIC SURGERY

## 2024-04-15 PROCEDURE — 3048F LDL-C <100 MG/DL: CPT | Performed by: ORTHOPAEDIC SURGERY

## 2024-04-15 PROCEDURE — 4010F ACE/ARB THERAPY RXD/TAKEN: CPT | Performed by: ORTHOPAEDIC SURGERY

## 2024-04-15 PROCEDURE — 99213 OFFICE O/P EST LOW 20 MIN: CPT | Performed by: ORTHOPAEDIC SURGERY

## 2024-04-15 PROCEDURE — 1125F AMNT PAIN NOTED PAIN PRSNT: CPT | Performed by: ORTHOPAEDIC SURGERY

## 2024-04-15 PROCEDURE — 1160F RVW MEDS BY RX/DR IN RCRD: CPT | Performed by: ORTHOPAEDIC SURGERY

## 2024-04-15 RX ORDER — PRASUGREL 10 MG/1
10 TABLET, FILM COATED ORAL DAILY
Qty: 90 TABLET | Refills: 3 | Status: SHIPPED | OUTPATIENT
Start: 2024-04-15 | End: 2024-04-24 | Stop reason: ALTCHOICE

## 2024-04-15 ASSESSMENT — PAIN - FUNCTIONAL ASSESSMENT: PAIN_FUNCTIONAL_ASSESSMENT: 0-10

## 2024-04-15 ASSESSMENT — PAIN SCALES - GENERAL: PAINLEVEL_OUTOF10: 2

## 2024-04-15 ASSESSMENT — PAIN DESCRIPTION - DESCRIPTORS: DESCRIPTORS: SORE;ACHING;PRESSURE

## 2024-04-18 DIAGNOSIS — Z00.00 HEALTHCARE MAINTENANCE: ICD-10-CM

## 2024-04-18 NOTE — PROGRESS NOTES
Patient presents for recurrence swelling anterior aspect of his right knee had aspirated sometime ago and it recurred almost immediately generally any pain but is just bothersome.  He is on blood thinners for cardiac problems  He is active  No fevers or chills  Past medical,family and social histories have been reviewed and are up to date.  All other body systems have been reviewed and are negative for complaint.  Constitutional: Well-developed well-nourished   Eyes: Sclerae anicteric, pupils equal and round  HENT: Normocephalic atraumatic  Cardiovascular: Pulses full, regular rate and rhythm  Respiratory: Breathing not labored, no wheezing  Integumentary: Skin intact, no lesions or rashes  Neurological: Sensation intact, no gross strength deficits, reflexes equal  Psychiatric: Alert oriented and appropriate  Hematologic/lymphatic: No lymphadenopathy  Right knee: There is a large fluid collection anterior aspect of the knee there is no erythema nontender is full mobility of the knee there is no knee effusion assessment recurrent prepatellar bursitis  Aspirated knee under sterile conditions recommend applying compressive wrap  Limiting activity  Discussed possible surgical excision  All questions answered  L Inj/Asp on 4/17/2024 8:22 PM  Indications: joint swelling  Details: 18 G needle, anterior approach  Aspirate: 60 mL bloody

## 2024-04-24 ENCOUNTER — OFFICE VISIT (OUTPATIENT)
Dept: CARDIOLOGY | Facility: HOSPITAL | Age: 64
End: 2024-04-24
Payer: MEDICARE

## 2024-04-24 VITALS
BODY MASS INDEX: 30.31 KG/M2 | OXYGEN SATURATION: 98 % | HEIGHT: 68 IN | DIASTOLIC BLOOD PRESSURE: 60 MMHG | HEART RATE: 74 BPM | WEIGHT: 200 LBS | SYSTOLIC BLOOD PRESSURE: 120 MMHG

## 2024-04-24 DIAGNOSIS — I10 ESSENTIAL HYPERTENSION: ICD-10-CM

## 2024-04-24 DIAGNOSIS — I25.10 CORONARY ARTERY DISEASE INVOLVING NATIVE CORONARY ARTERY OF NATIVE HEART WITHOUT ANGINA PECTORIS: Primary | ICD-10-CM

## 2024-04-24 DIAGNOSIS — Z86.79 HISTORY OF ATRIAL FIBRILLATION: ICD-10-CM

## 2024-04-24 DIAGNOSIS — E78.00 PURE HYPERCHOLESTEROLEMIA: ICD-10-CM

## 2024-04-24 LAB
ATRIAL RATE: 74 BPM
P AXIS: 26 DEGREES
P OFFSET: 106 MS
P ONSET: 49 MS
PR INTERVAL: 324 MS
Q ONSET: 211 MS
QRS COUNT: 12 BEATS
QRS DURATION: 104 MS
QT INTERVAL: 362 MS
QTC CALCULATION(BAZETT): 401 MS
QTC FREDERICIA: 388 MS
R AXIS: -38 DEGREES
T AXIS: 28 DEGREES
T OFFSET: 392 MS
VENTRICULAR RATE: 74 BPM

## 2024-04-24 PROCEDURE — 1159F MED LIST DOCD IN RCRD: CPT | Performed by: INTERNAL MEDICINE

## 2024-04-24 PROCEDURE — 3048F LDL-C <100 MG/DL: CPT | Performed by: INTERNAL MEDICINE

## 2024-04-24 PROCEDURE — 1160F RVW MEDS BY RX/DR IN RCRD: CPT | Performed by: INTERNAL MEDICINE

## 2024-04-24 PROCEDURE — 1036F TOBACCO NON-USER: CPT | Performed by: INTERNAL MEDICINE

## 2024-04-24 PROCEDURE — 3074F SYST BP LT 130 MM HG: CPT | Performed by: INTERNAL MEDICINE

## 2024-04-24 PROCEDURE — 99214 OFFICE O/P EST MOD 30 MIN: CPT | Performed by: INTERNAL MEDICINE

## 2024-04-24 PROCEDURE — 4010F ACE/ARB THERAPY RXD/TAKEN: CPT | Performed by: INTERNAL MEDICINE

## 2024-04-24 PROCEDURE — 3044F HG A1C LEVEL LT 7.0%: CPT | Performed by: INTERNAL MEDICINE

## 2024-04-24 PROCEDURE — 93005 ELECTROCARDIOGRAM TRACING: CPT | Performed by: INTERNAL MEDICINE

## 2024-04-24 PROCEDURE — 3078F DIAST BP <80 MM HG: CPT | Performed by: INTERNAL MEDICINE

## 2024-04-24 RX ORDER — CLOPIDOGREL BISULFATE 75 MG/1
75 TABLET ORAL DAILY
Qty: 90 TABLET | Refills: 3 | Status: SHIPPED | OUTPATIENT
Start: 2024-04-24 | End: 2025-04-24

## 2024-04-24 ASSESSMENT — ENCOUNTER SYMPTOMS
LOSS OF SENSATION IN FEET: 0
DEPRESSION: 0
OCCASIONAL FEELINGS OF UNSTEADINESS: 0

## 2024-04-24 NOTE — PROGRESS NOTES
Primary Care Physician: Noé Ralph MD  Date of Visit: 04/24/2024  3:20 PM EDT  Location of visit: [facility]     Chief Complaint:   Chief Complaint   Patient presents with    Follow-up        HPI / Summary:   Harpal oCvarrubias is a 64 y.o. male presents for followup.  He has no cardiac complaints.  He is generally active and runs on a treadmill and walks regularly without chest pain or shortness of breath.  The patient denies chest pain, shortness of breath, palpitations, lightheadedness, syncope, orthopnea, paroxysmal nocturnal dyspnea, lower extremity edema, or bleeding problems.          No past medical history on file.     Past Surgical History:   Procedure Laterality Date    OTHER SURGICAL HISTORY  10/14/2022    Colonoscopy - [facility] - polyp f/u 2027    OTHER SURGICAL HISTORY  10/14/2022    Esophagogastroduodenoscopy - [facility]          Social History:   reports that he has never smoked. He has never been exposed to tobacco smoke. He has never used smokeless tobacco. He reports that he does not currently use alcohol. He reports that he does not currently use drugs.     Family History:  family history is not on file.      Allergies:  Allergies   Allergen Reactions    Nitroglycerin Other and Anaphylaxis     Hypotension, Passing out       Outpatient Medications:  Current Outpatient Medications   Medication Instructions    aspirin 81 mg EC tablet 1 tablet, oral, Daily    blood sugar diagnostic (OneTouch Verio test strips) strip Use to test sugars twice a day    candesartan (ATACAND) 8 mg, oral, Daily    cholecalciferol (Vitamin D-3) 1,250 mcg (50,000 unit) capsule 1 capsule, oral, Weekly    FreeStyle Ashley 3 Sensor device     insulin lispro (HUMALOG)  Units, subcutaneous, PER USE OF SLIDING SCALE PRIOR TO MEALS    Jardiance 25 mg, oral, Daily    lansoprazole (Prevacid) 30 mg DR capsule TAKE 1 CAPSULE 1/2 HOUR BEFORE BREAKFAST AND DINNER    metoprolol succinate XL (Toprol-XL) 25 mg 24 hr tablet TAKE  "ONE-HALF (1/2) TABLET DAILY (DO NOT CRUSH OR CHEW)    Ozempic 1 mg/dose (4 mg/3 mL) pen injector INJECT 1 MG UNDER THE SKIN ONE TIME PER WEEK    pen needle, diabetic 31 gauge x 5/16\" needle miscellaneous, 4 times daily    prasugrel (EFFIENT) 10 mg, oral, Daily    rosuvastatin (CRESTOR) 40 mg, oral, Daily    Toujeo Max U-300 SoloStar 36 Units, subcutaneous, Nightly       Physical Exam:  Vitals:    04/24/24 1609   BP: 120/60   Pulse: 74   SpO2: 98%   Weight: 90.7 kg (200 lb)   Height: 1.727 m (5' 8\")     Wt Readings from Last 5 Encounters:   04/15/24 88.9 kg (196 lb)   02/19/24 88.9 kg (196 lb)   02/08/24 89.1 kg (196 lb 6.4 oz)   11/01/23 87.4 kg (192 lb 11.2 oz)   07/31/23 86.2 kg (190 lb)     Body mass index is 30.41 kg/m².   General: Well-developed well-nourished in no acute distress  HEENT: Normocephalic atraumatic  Neck: Supple, JVP is normal negative hepatojugular reflux 2+ carotid pulses without bruit  Pulmonary: Normal respiratory effort, clear to auscultation  Cardiovascular: No right ventricular heave, normal S1 and S2, no murmurs rubs or gallops  Abdomen: Soft nontender nondistended  Extremities: Warm without edema 2+ radial pulses bilaterally 2+ posterior tibial pulses bilaterally  Neurologic: Alert and oriented x3  Psychiatric: Normal mood and affect     Last Labs:  CMP:  Recent Labs     02/06/24  1041 10/30/23  1242 07/31/23  0951    141 137   K 4.8 4.5 5.1    102 103   CO2 31 30 29   ANIONGAP 12 14 10   BUN 16 18 34*   CREATININE 1.22 0.97 1.76*   EGFR 66 87  --    GLUCOSE 111* 86 77     Recent Labs     02/06/24  1041 10/30/23  1242 07/31/23  0951   ALBUMIN 4.5 4.5 4.2   ALKPHOS 78 72 87   ALT 29 22 17   AST 23 21 19   BILITOT 0.6 0.5 0.4     CBC:  Recent Labs     02/06/24  1041 10/30/23  1242 07/31/23  0951   WBC 9.1 8.7 9.3   HGB 17.1 15.9 15.5   HCT 52.7* 50.1 48.7    228 328   MCV 89 92 94     COAG:   Recent Labs     11/28/22  1230 11/28/22  1051 12/10/21  2032   INR 1.0 1.0  --  "   DDIMERVTE  --   --  369     HEME/ENDO:  Recent Labs     02/06/24  1041 10/30/23  1242 07/31/23  1437 06/15/23  1128 04/17/23  1411 11/28/22  1051   TSH  --  1.25  --   --  2.11 2.84   HGBA1C 6.3* 5.6 6.1   < > 6.0* 8.2*    < > = values in this interval not displayed.      CARDIAC:   Recent Labs     07/18/23  1456 11/28/22  1140 11/28/22  1051   TROPHS 16 4,995* 5,250*   BNP  --   --  150*     Recent Labs     02/06/24  1041 10/30/23  1242 06/15/23  1128 04/17/23  1411 11/29/22  0542   CHOL 107 108 82 103 96   LDLF  --   --  24 44 38   LDLCALC 42 41  --   --   --    HDL 51.4 52.3 36.0* 38.0* 36.6*   TRIG 70 73 111 104 107       Last Cardiology Tests:  ECG:  Electrocardiogram performed today that I reviewed shows normal sinus rhythm with first-degree AV block left axis deviation.    Echo:  Echocardiogram May 12, 2023  CONCLUSIONS:  1. Left ventricular systolic function is low normal with a 50-55% estimated ejection fraction.  2. Mid inferoseptal segment is abnormal.  3. Spectral Doppler shows an impaired relaxation pattern of left ventricular diastolic filling.       Cath:  Cardiac catheterization November 28, 2022  Coronary Lesion Summary:  Vessel         Stenosis    Vessel Segment  LAD          40% stenosis  proximal to mid  LAD          40% stenosis      distal  1st Diagonal 60% stenosis  proximal to mid  Circumflex   100% stenosis proximal to mid  Ramus        50% stenosis     proximal  RCA          40% stenosis     proximal  RCA          30% stenosis      distal   CONCLUSIONS:   1. Severe single vessel CAD in a codominant system.   2. Successful OCT guided PCI of the proximal-mid LCx with a 3.0x18mm Resolute [place] MARIELLA postdilated up to 3.25mm.   3. Moderate nonobstructive disease of LAD and RCA.   4. Left Ventricular end-diastolic pressure = 15.   5. Normal LVEDP.   6. No aortic stenosis.    Stress Test:  Stress Results:  No results found for this or any previous visit from the past 365 days.         Cardiac  Imaging:  CT abdomen and pelvis with contrast July 18, 2023  VESSELS:   Aortoiliac system is patent without aneurysm.  Major visceral   branches are patent. Mild atherosclerosis.   IVC and major branches are grossly patent.   Major portal venous branches are patent.       Assessment/Plan   Diagnoses and all orders for this visit:  Coronary artery disease involving native coronary artery of native heart without angina pectoris  -     ECG 12 lead (Clinic Performed)  -     clopidogrel (Plavix) 75 mg tablet; Take 1 tablet (75 mg) by mouth once daily.  History of atrial fibrillation  Pure hypercholesterolemia  Essential hypertension    In summary Mr. Covarrubias is a pleasant 64-year-old white male with a past medical history significant for coronary artery disease with a CT calcium score of 1916 status post PCI to a codominant left circumflex in the setting of a non-ST elevation myocardial infarction with recovery of his LV function, hypertension, hyperlipidemia, type II non-insulin-requiring diabetes, chronic kidney disease, remote atrial fibrillation status post ablation not on anticoagulation, and prior obesity.  He is asymptomatic from a cardiac perspective.  His blood pressure and lipids are at goal.  I did review the risks and benefits of prolonged dual antiplatelet therapy.  I switched his prasugrel to clopidogrel 75 mg daily.  He should continue his other cardiovascular medications.  I will see him back in follow-up in 6 months.        Orders:  Orders Placed This Encounter   Procedures    ECG 12 lead (Clinic Performed)      Followup Appts:  Future Appointments   Date Time Provider Department Center   5/1/2024  8:20 AM Juan Whitfield MD [id number] [facility]   5/16/2024  2:00 PM Noé Ralph MD [id number] [facility]   6/3/2024  4:00 PM Fernando Barboza MD [id number] [facility]           ____________________________________________________________  Balta Henry MD  [facility] [place]  [facility]

## 2024-04-25 DIAGNOSIS — I21.4 NON-ST ELEVATION MYOCARDIAL INFARCTION (NSTEMI), SUBSEQUENT EPISODE OF CARE (MULTI): ICD-10-CM

## 2024-04-25 DIAGNOSIS — I10 ESSENTIAL HYPERTENSION: ICD-10-CM

## 2024-04-25 DIAGNOSIS — E11.22 CKD STAGE 2 DUE TO TYPE 2 DIABETES MELLITUS (MULTI): Primary | ICD-10-CM

## 2024-04-25 DIAGNOSIS — N18.2 CKD STAGE 2 DUE TO TYPE 2 DIABETES MELLITUS (MULTI): Primary | ICD-10-CM

## 2024-04-25 RX ORDER — METOPROLOL SUCCINATE 25 MG/1
TABLET, EXTENDED RELEASE ORAL
Qty: 45 TABLET | Refills: 3 | Status: SHIPPED | OUTPATIENT
Start: 2024-04-25

## 2024-05-01 ENCOUNTER — APPOINTMENT (OUTPATIENT)
Dept: CARDIOLOGY | Facility: CLINIC | Age: 64
End: 2024-05-01
Payer: MEDICARE

## 2024-05-10 ENCOUNTER — LAB (OUTPATIENT)
Dept: LAB | Facility: LAB | Age: 64
End: 2024-05-10
Payer: MEDICARE

## 2024-05-10 DIAGNOSIS — Z00.00 HEALTHCARE MAINTENANCE: ICD-10-CM

## 2024-05-10 LAB
25(OH)D3 SERPL-MCNC: 48 NG/ML (ref 30–100)
ALBUMIN SERPL BCP-MCNC: 4.3 G/DL (ref 3.4–5)
ALP SERPL-CCNC: 79 U/L (ref 33–136)
ALT SERPL W P-5'-P-CCNC: 27 U/L (ref 10–52)
ANION GAP SERPL CALC-SCNC: 13 MMOL/L (ref 10–20)
APPEARANCE UR: CLEAR
AST SERPL W P-5'-P-CCNC: 23 U/L (ref 9–39)
BACTERIA #/AREA URNS AUTO: ABNORMAL /HPF
BASOPHILS # BLD AUTO: 0.12 X10*3/UL (ref 0–0.1)
BASOPHILS NFR BLD AUTO: 1.4 %
BILIRUB SERPL-MCNC: 0.7 MG/DL (ref 0–1.2)
BILIRUB UR STRIP.AUTO-MCNC: NEGATIVE MG/DL
BUN SERPL-MCNC: 26 MG/DL (ref 6–23)
CALCIUM SERPL-MCNC: 9.7 MG/DL (ref 8.6–10.6)
CHLORIDE SERPL-SCNC: 103 MMOL/L (ref 98–107)
CHOLEST SERPL-MCNC: 96 MG/DL (ref 0–199)
CHOLESTEROL/HDL RATIO: 2.1
CO2 SERPL-SCNC: 30 MMOL/L (ref 21–32)
COLOR UR: ABNORMAL
CREAT SERPL-MCNC: 1.3 MG/DL (ref 0.5–1.3)
CRP SERPL HS-MCNC: 0.8 MG/L
EGFRCR SERPLBLD CKD-EPI 2021: 61 ML/MIN/1.73M*2
EOSINOPHIL # BLD AUTO: 0.42 X10*3/UL (ref 0–0.7)
EOSINOPHIL NFR BLD AUTO: 4.9 %
ERYTHROCYTE [DISTWIDTH] IN BLOOD BY AUTOMATED COUNT: 14 % (ref 11.5–14.5)
EST. AVERAGE GLUCOSE BLD GHB EST-MCNC: 146 MG/DL
GLUCOSE SERPL-MCNC: 92 MG/DL (ref 74–99)
GLUCOSE UR STRIP.AUTO-MCNC: ABNORMAL MG/DL
HBA1C MFR BLD: 6.7 %
HCT VFR BLD AUTO: 55.7 % (ref 41–52)
HDLC SERPL-MCNC: 45.1 MG/DL
HGB BLD-MCNC: 18.5 G/DL (ref 13.5–17.5)
HOLD SPECIMEN: NORMAL
IMM GRANULOCYTES # BLD AUTO: 0.04 X10*3/UL (ref 0–0.7)
IMM GRANULOCYTES NFR BLD AUTO: 0.5 % (ref 0–0.9)
KETONES UR STRIP.AUTO-MCNC: NEGATIVE MG/DL
LDLC SERPL CALC-MCNC: 39 MG/DL
LEUKOCYTE ESTERASE UR QL STRIP.AUTO: NEGATIVE
LYMPHOCYTES # BLD AUTO: 2.52 X10*3/UL (ref 1.2–4.8)
LYMPHOCYTES NFR BLD AUTO: 29.4 %
MCH RBC QN AUTO: 29.6 PG (ref 26–34)
MCHC RBC AUTO-ENTMCNC: 33.2 G/DL (ref 32–36)
MCV RBC AUTO: 89 FL (ref 80–100)
MONOCYTES # BLD AUTO: 0.69 X10*3/UL (ref 0.1–1)
MONOCYTES NFR BLD AUTO: 8.1 %
NEUTROPHILS # BLD AUTO: 4.77 X10*3/UL (ref 1.2–7.7)
NEUTROPHILS NFR BLD AUTO: 55.7 %
NITRITE UR QL STRIP.AUTO: NEGATIVE
NON HDL CHOLESTEROL: 51 MG/DL (ref 0–149)
NRBC BLD-RTO: 0 /100 WBCS (ref 0–0)
PH UR STRIP.AUTO: 5.5 [PH]
PLATELET # BLD AUTO: 248 X10*3/UL (ref 150–450)
POTASSIUM SERPL-SCNC: 5.5 MMOL/L (ref 3.5–5.3)
PROT SERPL-MCNC: 7.2 G/DL (ref 6.4–8.2)
PROT UR STRIP.AUTO-MCNC: ABNORMAL MG/DL
PSA SERPL-MCNC: 1.76 NG/ML
RBC # BLD AUTO: 6.24 X10*6/UL (ref 4.5–5.9)
RBC # UR STRIP.AUTO: NEGATIVE /UL
RBC #/AREA URNS AUTO: ABNORMAL /HPF
SODIUM SERPL-SCNC: 140 MMOL/L (ref 136–145)
SP GR UR STRIP.AUTO: 1.03
SQUAMOUS #/AREA URNS AUTO: ABNORMAL /HPF
TRIGL SERPL-MCNC: 61 MG/DL (ref 0–149)
TSH SERPL-ACNC: 1.92 MIU/L (ref 0.44–3.98)
UROBILINOGEN UR STRIP.AUTO-MCNC: NORMAL MG/DL
VLDL: 12 MG/DL (ref 0–40)
WBC # BLD AUTO: 8.6 X10*3/UL (ref 4.4–11.3)
WBC #/AREA URNS AUTO: ABNORMAL /HPF

## 2024-05-10 PROCEDURE — 80061 LIPID PANEL: CPT

## 2024-05-10 PROCEDURE — 86141 C-REACTIVE PROTEIN HS: CPT

## 2024-05-10 PROCEDURE — 83036 HEMOGLOBIN GLYCOSYLATED A1C: CPT

## 2024-05-10 PROCEDURE — 84443 ASSAY THYROID STIM HORMONE: CPT

## 2024-05-10 PROCEDURE — 84153 ASSAY OF PSA TOTAL: CPT

## 2024-05-10 PROCEDURE — 81001 URINALYSIS AUTO W/SCOPE: CPT

## 2024-05-10 PROCEDURE — 36415 COLL VENOUS BLD VENIPUNCTURE: CPT

## 2024-05-10 PROCEDURE — 80053 COMPREHEN METABOLIC PANEL: CPT

## 2024-05-10 PROCEDURE — 82306 VITAMIN D 25 HYDROXY: CPT

## 2024-05-10 PROCEDURE — 85025 COMPLETE CBC W/AUTO DIFF WBC: CPT

## 2024-05-16 ENCOUNTER — OFFICE VISIT (OUTPATIENT)
Dept: PRIMARY CARE | Facility: CLINIC | Age: 64
End: 2024-05-16
Payer: MEDICARE

## 2024-05-16 VITALS
DIASTOLIC BLOOD PRESSURE: 70 MMHG | HEIGHT: 71 IN | WEIGHT: 204 LBS | HEART RATE: 73 BPM | TEMPERATURE: 98.7 F | OXYGEN SATURATION: 95 % | BODY MASS INDEX: 28.56 KG/M2 | SYSTOLIC BLOOD PRESSURE: 127 MMHG

## 2024-05-16 VITALS
TEMPERATURE: 98.7 F | SYSTOLIC BLOOD PRESSURE: 127 MMHG | HEART RATE: 73 BPM | WEIGHT: 204 LBS | OXYGEN SATURATION: 95 % | DIASTOLIC BLOOD PRESSURE: 70 MMHG | HEIGHT: 71 IN | BODY MASS INDEX: 28.56 KG/M2

## 2024-05-16 DIAGNOSIS — M25.561 RIGHT KNEE PAIN, UNSPECIFIED CHRONICITY: ICD-10-CM

## 2024-05-16 DIAGNOSIS — I25.10 CORONARY ARTERY DISEASE INVOLVING NATIVE CORONARY ARTERY OF NATIVE HEART WITHOUT ANGINA PECTORIS: ICD-10-CM

## 2024-05-16 DIAGNOSIS — E11.39 TYPE 2 DIABETES MELLITUS WITH OTHER OPHTHALMIC COMPLICATION, WITH LONG-TERM CURRENT USE OF INSULIN (MULTI): ICD-10-CM

## 2024-05-16 DIAGNOSIS — N18.2 CKD STAGE 2 DUE TO TYPE 2 DIABETES MELLITUS (MULTI): ICD-10-CM

## 2024-05-16 DIAGNOSIS — I10 ESSENTIAL HYPERTENSION: ICD-10-CM

## 2024-05-16 DIAGNOSIS — Z00.00 MEDICARE ANNUAL WELLNESS VISIT, INITIAL: Primary | ICD-10-CM

## 2024-05-16 DIAGNOSIS — Z79.4 TYPE 2 DIABETES MELLITUS WITH OTHER OPHTHALMIC COMPLICATION, WITH LONG-TERM CURRENT USE OF INSULIN (MULTI): ICD-10-CM

## 2024-05-16 DIAGNOSIS — M70.41 PREPATELLAR BURSITIS OF RIGHT KNEE: ICD-10-CM

## 2024-05-16 DIAGNOSIS — D58.2 ELEVATED HEMOGLOBIN (CMS-HCC): ICD-10-CM

## 2024-05-16 DIAGNOSIS — E11.22 CKD STAGE 2 DUE TO TYPE 2 DIABETES MELLITUS (MULTI): ICD-10-CM

## 2024-05-16 DIAGNOSIS — Z00.01 ENCOUNTER FOR GENERAL ADULT MEDICAL EXAMINATION WITH ABNORMAL FINDINGS: Primary | ICD-10-CM

## 2024-05-16 PROCEDURE — 1160F RVW MEDS BY RX/DR IN RCRD: CPT | Performed by: FAMILY MEDICINE

## 2024-05-16 PROCEDURE — 4010F ACE/ARB THERAPY RXD/TAKEN: CPT | Performed by: FAMILY MEDICINE

## 2024-05-16 PROCEDURE — G0438 PPPS, INITIAL VISIT: HCPCS | Performed by: FAMILY MEDICINE

## 2024-05-16 PROCEDURE — UHSPHYS PR UH SELECT PHYSICAL: Performed by: FAMILY MEDICINE

## 2024-05-16 PROCEDURE — 1159F MED LIST DOCD IN RCRD: CPT | Performed by: FAMILY MEDICINE

## 2024-05-16 PROCEDURE — 3074F SYST BP LT 130 MM HG: CPT | Performed by: FAMILY MEDICINE

## 2024-05-16 PROCEDURE — 1126F AMNT PAIN NOTED NONE PRSNT: CPT | Performed by: FAMILY MEDICINE

## 2024-05-16 PROCEDURE — 3044F HG A1C LEVEL LT 7.0%: CPT | Performed by: FAMILY MEDICINE

## 2024-05-16 PROCEDURE — 3048F LDL-C <100 MG/DL: CPT | Performed by: FAMILY MEDICINE

## 2024-05-16 PROCEDURE — 3078F DIAST BP <80 MM HG: CPT | Performed by: FAMILY MEDICINE

## 2024-05-16 RX ORDER — METFORMIN HYDROCHLORIDE 1000 MG/1
TABLET ORAL
COMMUNITY

## 2024-05-16 RX ORDER — LACTOBACILLUS RHAMNOSUS GG 10B CELL
1 CAPSULE ORAL DAILY
COMMUNITY

## 2024-05-16 RX ORDER — GLIPIZIDE 10 MG/1
TABLET ORAL
COMMUNITY

## 2024-05-16 RX ORDER — BACILLUS COAGULANS 250MM CELL
TABLET,CHEWABLE ORAL
COMMUNITY

## 2024-05-16 ASSESSMENT — PAIN SCALES - GENERAL
PAINLEVEL: 0-NO PAIN
PAINLEVEL: 0-NO PAIN

## 2024-05-16 ASSESSMENT — PATIENT HEALTH QUESTIONNAIRE - PHQ9
SUM OF ALL RESPONSES TO PHQ9 QUESTIONS 1 & 2: 0
1. LITTLE INTEREST OR PLEASURE IN DOING THINGS: NOT AT ALL
2. FEELING DOWN, DEPRESSED OR HOPELESS: NOT AT ALL

## 2024-05-16 ASSESSMENT — LIFESTYLE VARIABLES
AUDIT-C TOTAL SCORE: 0
SKIP TO QUESTIONS 9-10: 1
HOW OFTEN DO YOU HAVE A DRINK CONTAINING ALCOHOL: NEVER
HOW MANY STANDARD DRINKS CONTAINING ALCOHOL DO YOU HAVE ON A TYPICAL DAY: PATIENT DOES NOT DRINK
HOW OFTEN DO YOU HAVE SIX OR MORE DRINKS ON ONE OCCASION: NEVER

## 2024-05-16 ASSESSMENT — ENCOUNTER SYMPTOMS
LOSS OF SENSATION IN FEET: 0
OCCASIONAL FEELINGS OF UNSTEADINESS: 0
DEPRESSION: 0

## 2024-05-16 NOTE — PROGRESS NOTES
"Harpal Covarrubias is a 64 year old here for a Medicare Annual Wellness Visit     Health Risk Assessment  In general, health is:  \"my health is good\"     Concerns with balance:  no     Concerns with teeth or dentures:  no     Concerns with sexual function:  no     Felt anxious, stressed, angry, irritable, lonely, isolated, or had thoughts of hurting themself:  no     Has little interest or pleasure in doing things:  no     Bothered by feeling down, depressed, or hopeless:  no     Needs help with grocery shopping, cooking, housework, bathing, grooming, dressing, eating, sitting or standing, walking, using the toilet, handling finances, taking medications, using the telephone, or driving:  no     Following safety precautions in the home environment and vehicle: removed throw rugs from floors, installed grab bars in the bathroom, handrails in stairwells, having adequate lighting, wearing seatbelt at all times?:  no     Smokes cigarettes, vapes, or chew tobacco:  no     Eats healthy foods including fruits, vegetables, whole grains, and fiber-rich foods:  no     Number of days per week engages in exercise:  3/7     Average alcohol consumption: 0      Current Providers  Specialists: I have reviewed specialist-related care of the patient in the medical record.     Medical/Family history review  Reviewed and updated problem list, medical/surgical/family/social history, medications, and allergies.     Opioid use review  Patient use of opioids:  0           Depression screening  Depression Screening PHQ-2 Score   Today 0         Depression screening tool completed and reviewed. Based on score and interview, patient is not at risk for depression. Screening tool discussed with patient, and I recommended no further intervention at this time.     Cognitive screening  Mini Cog Score:  3/5 (1 of 3 words)     Cognitive screening reviewed and plan: Knowledges that he can struggle with names at times but otherwise he is really not had " "any hiccups with his business or in his personal life -earlier prior to the cognitive screening I asked a bit about his family history and the lack of a father figure in his life and he became very emotional when discussing his birth father -I do suspect that had some impact on his attention/focus for this, I have not known him to really have struggles cognitively otherwise     Functional Observation  Was the patient's timed Up & Go test unsteady or >= 12 seconds?  No      Advance Care Planning  End of Life planning discussed, including patient's advanced directive wishes:  done - wife is DPoA    Vision and Hearing assessment  Visual acuity (required for Welcome to Medicare):  Dr Noguera - [facility]  Hearing Evaluation:  no clear hearing deficit    ====================================================    /70 (BP Location: Left arm, Patient Position: Sitting, BP Cuff Size: Adult)   Pulse 73   Temp 37.1 °C (98.7 °F) (Temporal)   Ht 1.807 m (5' 11.14\")   Wt 92.5 kg (204 lb)   SpO2 95%   BMI 28.34 kg/m²     Chief Complaint   Patient presents with    Medicare Annual Wellness Visit Initial       HPI  1)  ongoing concerns about his right knee  Has had it drained x 2, seems to be back to where it was before  Was suggested that if he needed it done a third time that the doctor would recommend potential bursectomy  He would like to get another opinion at this point just to see if there may be some other solution to this problem  No recent trauma noted  Can feel a bit warm compared to his other knee  No recent trauma  He has been wearing compression at times but that does not seem to really help, he wonders if it actually might be making it worse    2) metabolic syndrome  Type 2 diabetes and coronary artery disease  No marked hyper or hypoglycemic symptoms noted  Aware that his A1c is crept up  He attributes that to not being as strict with his nutritional approach as he has been in the past and also not really able to " "sustain meaningful and routine activity (in part related to his knee)  He still has some issues with the Ozempic potentially causing some abdominal/GI problems and he is wondering if he may want to do a trial off of it -however, some of that also seems to be an issue that he takes as an affront when people look at him and make a comment about his weightloss and assume it is related to one of the new medication such as Ozempic  Also, his GFR has dropped but still in stage II range -lipid profile still great and his CRP is good too -denies any significant chest pain, shortness of breath, dizziness, lightheadedness, hand or foot swelling that is new or different  Still being followed by endocrinology and cardiology    3) elevated hemoglobin and RBC indices   Did feel he was adequately hydrated prior to his lab testing no prior history of this really -otherwise CBC    Review of Systems  Essentially noncontributory otherwise    Objective   /70 (BP Location: Left arm, Patient Position: Sitting, BP Cuff Size: Adult)   Pulse 73   Temp 37.1 °C (98.7 °F) (Temporal)   Ht 1.807 m (5' 11.14\")   Wt 92.5 kg (204 lb)   SpO2 95%   BMI 28.34 kg/m²     Physical Exam  Vitals and nursing note reviewed.   Constitutional:       General: He is not in acute distress.     Appearance: He is not ill-appearing.   HENT:      Head: Normocephalic and atraumatic.      Right Ear: Tympanic membrane normal.      Left Ear: Tympanic membrane normal.      Mouth/Throat:      Pharynx: No posterior oropharyngeal erythema.   Eyes:      General: No scleral icterus.     Extraocular Movements: Extraocular movements intact.      Pupils: Pupils are equal, round, and reactive to light.   Cardiovascular:      Rate and Rhythm: Normal rate and regular rhythm.      Heart sounds: No murmur heard.  Pulmonary:      Breath sounds: Normal breath sounds. No wheezing or rhonchi.   Abdominal:      General: Bowel sounds are normal. There is no distension.      " Palpations: Abdomen is soft.      Tenderness: There is no abdominal tenderness.   Musculoskeletal:         General: Normal range of motion.      Cervical back: Normal range of motion.      Right lower leg: No edema.      Left lower leg: No edema.      Comments: Right knee with significant prepatellar bursitis palpable through his pant leg as noted previously   Lymphadenopathy:      Cervical: No cervical adenopathy.   Skin:     General: Skin is warm and dry.   Neurological:      Mental Status: He is alert and oriented to person, place, and time. Mental status is at baseline.      Motor: No weakness.      Coordination: Coordination normal.      Deep Tendon Reflexes: Reflexes normal.   Psychiatric:         Mood and Affect: Mood normal.         Behavior: Behavior normal.         Thought Content: Thought content normal.         Judgment: Judgment normal.         Assessment/Plan   Problem List Items Addressed This Visit       Essential hypertension    CKD stage 2 due to type 2 diabetes mellitus (Multi)    Coronary artery disease     Other Visit Diagnoses       Encounter for general adult medical examination with abnormal findings    -  Primary    Prepatellar bursitis of right knee        Relevant Orders    Referral to Orthopaedic Surgery    Right knee pain, unspecified chronicity        Relevant Orders    Iron and TIBC    Elevated hemoglobin (CMS-HCC)        Relevant Orders    Iron and TIBC    CBC and Auto Differential    Type 2 diabetes mellitus with other ophthalmic complication, with long-term current use of insulin (Multi)            1) Knee - would simply like another opinion about his problem     2) Iron issues - unable to add-on test - encourage to recheck in 4-8 weeks    3) check in on overall progress in about 8 weeks - will plan to do typical array of tests this summer (A1c, lipid, etc)

## 2024-05-16 NOTE — Clinical Note
He is hoping to get another opinion about his knee.  He does have an appointment scheduled next month, but he would prefer to see someone like Dr. Hunt.  Thought he was Connect, but I don't see it.  Thanks!

## 2024-05-20 NOTE — PROGRESS NOTES
Subjective   Patient ID: Harpal Covarrubias is a 64 y.o. male who presents for Annual Exam ([facility]).  HPI  1) ongoing concerns about his right knee  Has had it drained x 2, seems to be back to where it was before  Was suggested that if he needed it done a third time that the doctor would recommend potential bursectomy  He would like to get another opinion at this point just to see if there may be some other solution to this problem  No recent trauma noted  Can feel a bit warm compared to his other knee  No recent trauma  He has been wearing compression at times but that does not seem to really help, he wonders if it actually might be making it worse    2) metabolic syndrome  Type 2 diabetes and coronary artery disease  No marked hyper or hypoglycemic symptoms noted  Aware that his A1c is crept up  He attributes that to not being as strict with his nutritional approach as he has been in the past and also not really able to sustain meaningful and routine activity (in part related to his knee)  He still has some issues with the Ozempic potentially causing some abdominal/GI problems and he is wondering if he may want to do a trial off of it -however, some of that also seems to be an issue that he takes as an affront when people look at him and make a comment about his weightloss and assume it is related to one of the new medication such as Ozempic  Also, his GFR has dropped but still in stage II range -lipid profile still great and his CRP is good too -denies any significant chest pain, shortness of breath, dizziness, lightheadedness, hand or foot swelling that is new or different  Still being followed by endocrinology and cardiology    3) elevated hemoglobin and RBC indices   Did feel he was adequately hydrated prior to his lab testing no prior history of this really -otherwise CBC    Review of Systems  Essentially noncontributory otherwise    Objective   /70 (BP Location: Left arm, Patient Position:  "Sitting, BP Cuff Size: Adult)   Pulse 73   Temp 37.1 °C (98.7 °F) (Temporal)   Ht 1.807 m (5' 11.14\")   Wt 92.5 kg (204 lb)   SpO2 95%   BMI 28.34 kg/m²     Physical Exam  Vitals and nursing note reviewed.   Constitutional:       General: He is not in acute distress.     Appearance: He is not ill-appearing.   HENT:      Head: Normocephalic and atraumatic.      Right Ear: Tympanic membrane normal.      Left Ear: Tympanic membrane normal.      Mouth/Throat:      Pharynx: No posterior oropharyngeal erythema.   Eyes:      General: No scleral icterus.     Extraocular Movements: Extraocular movements intact.      Pupils: Pupils are equal, round, and reactive to light.   Cardiovascular:      Rate and Rhythm: Normal rate and regular rhythm.      Heart sounds: No murmur heard.  Pulmonary:      Breath sounds: Normal breath sounds. No wheezing or rhonchi.   Abdominal:      General: Bowel sounds are normal. There is no distension.      Palpations: Abdomen is soft.      Tenderness: There is no abdominal tenderness.   Musculoskeletal:         General: Normal range of motion.      Cervical back: Normal range of motion.      Right lower leg: No edema.      Left lower leg: No edema.      Comments: Right knee with significant prepatellar bursitis palpable through his pant leg as noted previously   Lymphadenopathy:      Cervical: No cervical adenopathy.   Skin:     General: Skin is warm and dry.   Neurological:      Mental Status: He is alert and oriented to person, place, and time. Mental status is at baseline.      Motor: No weakness.      Coordination: Coordination normal.      Deep Tendon Reflexes: Reflexes normal.   Psychiatric:         Mood and Affect: Mood normal.         Behavior: Behavior normal.         Thought Content: Thought content normal.         Judgment: Judgment normal.         Assessment/Plan   Problem List Items Addressed This Visit       Essential hypertension    CKD stage 2 due to type 2 diabetes mellitus " (Multi)    Coronary artery disease     Other Visit Diagnoses       Encounter for general adult medical examination with abnormal findings    -  Primary    Prepatellar bursitis of right knee        Relevant Orders    Referral to Orthopaedic Surgery    Right knee pain, unspecified chronicity        Relevant Orders    Iron and TIBC    Elevated hemoglobin (CMS-HCC)        Relevant Orders    Iron and TIBC    CBC and Auto Differential    Type 2 diabetes mellitus with other ophthalmic complication, with long-term current use of insulin (Multi)            1) Knee - would simply like another opinion about his problem     2) Iron issues - unable to add-on test - encourage to recheck in 4-8 weeks    3) check in on overall progress in about 8 weeks - will plan to do typical array of tests this summer (A1c, lipid, etc)

## 2024-06-03 ENCOUNTER — APPOINTMENT (OUTPATIENT)
Dept: ORTHOPEDIC SURGERY | Facility: CLINIC | Age: 64
End: 2024-06-03
Payer: MEDICARE

## 2024-06-07 ENCOUNTER — APPOINTMENT (OUTPATIENT)
Dept: CARDIOLOGY | Facility: CLINIC | Age: 64
End: 2024-06-07
Payer: MEDICARE

## 2024-06-10 DIAGNOSIS — I25.10 CORONARY ARTERY DISEASE INVOLVING NATIVE CORONARY ARTERY OF NATIVE HEART WITHOUT ANGINA PECTORIS: Primary | ICD-10-CM

## 2024-06-10 DIAGNOSIS — E11.9 DIABETES MELLITUS TYPE II, NON INSULIN DEPENDENT (MULTI): ICD-10-CM

## 2024-06-10 DIAGNOSIS — E11.22 CKD STAGE 2 DUE TO TYPE 2 DIABETES MELLITUS (MULTI): ICD-10-CM

## 2024-06-10 DIAGNOSIS — N18.2 CKD STAGE 2 DUE TO TYPE 2 DIABETES MELLITUS (MULTI): ICD-10-CM

## 2024-06-11 ENCOUNTER — OFFICE VISIT (OUTPATIENT)
Dept: ORTHOPEDIC SURGERY | Facility: HOSPITAL | Age: 64
End: 2024-06-11
Payer: MEDICARE

## 2024-06-11 ENCOUNTER — HOSPITAL ENCOUNTER (OUTPATIENT)
Dept: RADIOLOGY | Facility: EXTERNAL LOCATION | Age: 64
Discharge: HOME | End: 2024-06-11

## 2024-06-11 DIAGNOSIS — I25.10 CORONARY ARTERY DISEASE INVOLVING NATIVE HEART WITHOUT ANGINA PECTORIS, UNSPECIFIED VESSEL OR LESION TYPE: ICD-10-CM

## 2024-06-11 DIAGNOSIS — Z86.39 HISTORY OF HYPERLIPIDEMIA: ICD-10-CM

## 2024-06-11 DIAGNOSIS — R93.1 HIGH CORONARY ARTERY CALCIUM SCORE: ICD-10-CM

## 2024-06-11 DIAGNOSIS — M70.41 PREPATELLAR BURSITIS OF RIGHT KNEE: ICD-10-CM

## 2024-06-11 PROCEDURE — 3048F LDL-C <100 MG/DL: CPT | Performed by: FAMILY MEDICINE

## 2024-06-11 PROCEDURE — A4467 BELT STRAP SLEEV GRMNT COVER: HCPCS | Performed by: FAMILY MEDICINE

## 2024-06-11 PROCEDURE — 20611 DRAIN/INJ JOINT/BURSA W/US: CPT | Mod: RT | Performed by: FAMILY MEDICINE

## 2024-06-11 PROCEDURE — 99204 OFFICE O/P NEW MOD 45 MIN: CPT | Performed by: FAMILY MEDICINE

## 2024-06-11 PROCEDURE — 3044F HG A1C LEVEL LT 7.0%: CPT | Performed by: FAMILY MEDICINE

## 2024-06-11 PROCEDURE — 1159F MED LIST DOCD IN RCRD: CPT | Performed by: FAMILY MEDICINE

## 2024-06-11 PROCEDURE — 4010F ACE/ARB THERAPY RXD/TAKEN: CPT | Performed by: FAMILY MEDICINE

## 2024-06-11 PROCEDURE — 99214 OFFICE O/P EST MOD 30 MIN: CPT | Performed by: FAMILY MEDICINE

## 2024-06-11 RX ORDER — PREDNISONE 20 MG/1
TABLET ORAL
Qty: 30 TABLET | Refills: 0 | Status: SHIPPED | OUTPATIENT
Start: 2024-06-11

## 2024-06-11 RX ORDER — ROSUVASTATIN CALCIUM 40 MG/1
40 TABLET, COATED ORAL DAILY
Qty: 90 TABLET | Refills: 3 | Status: SHIPPED | OUTPATIENT
Start: 2024-06-11

## 2024-06-11 ASSESSMENT — PAIN - FUNCTIONAL ASSESSMENT: PAIN_FUNCTIONAL_ASSESSMENT: 0-10

## 2024-06-11 NOTE — LETTER
June 12, 2024     Noé Ralph MD  [address] [facility]  [facility] [address]    Patient: Harpal Covarrubias   YOB: 1960   Date of Visit: 6/11/2024       Dear Dr. Noé Ralph MD:    Thank you for referring Harpal Covarrubias to me for evaluation. Below are my notes for this consultation.  If you have questions, please do not hesitate to call me. I look forward to following your patient along with you.       Sincerely,     Bishop Hunt MD      CC: No Recipients  ______________________________________________________________________________________    Sports Medicine Office Note    Today's Date:  06/11/2024Patient ID: Harpal Covarrubias is a 64 y.o. male.    L Inj/Asp: R knee on 6/11/2024 1:43 PM  Indications: joint swelling  Details: 18 G needle, ultrasound-guided anterior approach  Outcome: tolerated well, no immediate complications  Procedure, treatment alternatives, risks and benefits explained, specific risks discussed. Consent was given by the patient. Immediately prior to procedure a time out was called to verify the correct patient, procedure, equipment, support staff and site/side marked as required. Patient was prepped and draped in the usual sterile fashion.            HPI: Harpal Covarrubias is a 64 y.o. male  who presents today for right knee swelling and pain    His right knee swelling started approximately 8 to 9 months ago.  He does not remember any trauma prior to the onset of the swelling but did get 2 husky puppies around that time. He plays with them often and is typically kneeling on a carpeted floor.  He initially saw his PCP for the swelling who referred him to Dr. Barboza.  He has been seen Dr. Barboza in February of this year and April of this year and had a prepatellar bursal drainage performed at both visits resulting in 60 cc and 70 cc of fluid aspirated respectively.  Last time Dr. Barboza did talk to him about a bursectomy if  the bursa was refractory.  He has been treating the swelling with icing and intermittent compression sleeve with minimal to no improvement.    He has no other complaints.    Physical Examination:   The right knee is without obvious signs of acute bony deformity, erythema, ecchymosis or joint effusion.  There is a large prepatellar bursa over the anterior knee.  The patella was unable to be palpated secondary to the bursa.  The medial joint line is nontender and without bony crepitus or step-off. The lateral joint line is nontender and without bony crepitus or step-off. Flexion & extension are full and symmetrical. Varus & valgus stress test at 0° and 30° of flexion, Lachman's, and posterior drawer are all negative. The opposite knee is nontender and stable. Gait is pain-free and tandem.    Imaging:  Radiographs of the right knee were reviewed and revealed patellofemoral osteoarthritis, significant soft tissue superficially to the patella.    The studies were reviewed by me personally in the office today.      Procedure:  After consent was obtained, right knee was prepped in a sterile fashion. Ultrasound guidance was used to help insure proper needle placement into the prepatellar bursa, decrease patient discomfort, and decrease collateral damage. The joint was visualized and 44 mL of serosanguineous fluid was aspirated without any complications. Ultrasound images were saved on an internal file for later reference. The patient tolerated the procedure well and the area was cleaned and bandaged.    Procedure Note Attestation   Procedure performed by Dr. Bishop Hunt MD.      Problem List Items Addressed This Visit    None  Visit Diagnoses         Codes    Prepatellar bursitis of right knee     M70.41    Relevant Medications    predniSONE (Deltasone) 20 mg tablet    Other Relevant Orders    Point of Care Ultrasound (Completed)    Knee Sleeve            Assessment and Plan:     We reviewed the exam and x-ray findings  and discussed the conservative and surgical treatment options. We agreed to do a prepatellar bursa aspiration today.  Recommended a compression sleeve throughout the day.  Prednisone ordered today in the office.  Patient is unable to do anti-inflammatories secondary to CAD.  Instructed that if this does return he should follow-up with a new surgeon for discussion about bursectomy. All questions were answered and he is agreeable to plan.  Follow-up as needed.    Patient was prescribed a knee sleeve for prepatellar bursa.The patient is ambulatory with or without aid; but, has weakness, instability and/or deformity of their right knee which requires stabilization from this orthosis to improve their function.      Verbal and written instructions for the use, wear schedule, cleaning and application of this item were given.  Patient was instructed that should the brace result in increased pain, decreased sensation, increased swelling, or an overall worsening of their medical condition, to please contact our office immediately.     Orthotic management and training was provided for skin care, modifications due to healing tissues, edema changes, interruption in skin integrity, and safety precautions with the orthosis.    Referring physician: Dr. Noé Parra DO  Primary Care Sports Medicine Fellow    **This note was dictated using Dragon speech recognition software and was not corrected for spelling or grammatical errors**.      Attestation with edits by Bishop Hunt MD at 6/12/2024  7:39 PM:    Attending Note     Trainee role: Fellow    Trainee discussed patient with Dr. Hunt          I saw and evaluated the patient. I personally obtained the key and critical portions of the history and physical exam or was physically present for key and critical portions performed by the trainee. I reviewed the trainee's documentation and discussed the patient with the trainee. I agree with the trainee's medical decision  making, as documented on the trainee's note.     **We had a lengthy discussion about his recurrent prepatellar bursitis.  It is hemorrhagic appearing which is likely secondary to his chronic blood thinner use.  He denies any history of injury and this is likely a inflammatory bursitis due to playing on the floor with his puppy dogs.  This is clearly not infectious.  I did recommend that if this returns that he pursue surgical bursectomy and no more aspirations.    Bishop Hunt MD  Sports Medicine Specialist  [facility] [place]

## 2024-06-11 NOTE — PROGRESS NOTES
Sports Medicine Office Note    Today's Date:  06/11/2024Patient ID: Harpal Covarrubias is a 64 y.o. male.    L Inj/Asp: R knee on 6/11/2024 1:43 PM  Indications: joint swelling  Details: 18 G needle, ultrasound-guided anterior approach  Outcome: tolerated well, no immediate complications  Procedure, treatment alternatives, risks and benefits explained, specific risks discussed. Consent was given by the patient. Immediately prior to procedure a time out was called to verify the correct patient, procedure, equipment, support staff and site/side marked as required. Patient was prepped and draped in the usual sterile fashion.            HPI: Harpal Covarrubias is a 64 y.o. male  who presents today for right knee swelling and pain    His right knee swelling started approximately 8 to 9 months ago.  He does not remember any trauma prior to the onset of the swelling but did get 2 husky puppies around that time. He plays with them often and is typically kneeling on a carpeted floor.  He initially saw his PCP for the swelling who referred him to Dr. Barboza.  He has been seen Dr. Barboza in February of this year and April of this year and had a prepatellar bursal drainage performed at both visits resulting in 60 cc and 70 cc of fluid aspirated respectively.  Last time Dr. Barboza did talk to him about a bursectomy if the bursa was refractory.  He has been treating the swelling with icing and intermittent compression sleeve with minimal to no improvement.    He has no other complaints.    Physical Examination:   The right knee is without obvious signs of acute bony deformity, erythema, ecchymosis or joint effusion.  There is a large prepatellar bursa over the anterior knee.  The patella was unable to be palpated secondary to the bursa.  The medial joint line is nontender and without bony crepitus or step-off. The lateral joint line is nontender and without bony crepitus or step-off. Flexion & extension are full  and symmetrical. Varus & valgus stress test at 0° and 30° of flexion, Lachman's, and posterior drawer are all negative. The opposite knee is nontender and stable. Gait is pain-free and tandem.    Imaging:  Radiographs of the right knee were reviewed and revealed patellofemoral osteoarthritis, significant soft tissue superficially to the patella.    The studies were reviewed by me personally in the office today.      Procedure:  After consent was obtained, right knee was prepped in a sterile fashion. Ultrasound guidance was used to help insure proper needle placement into the prepatellar bursa, decrease patient discomfort, and decrease collateral damage. The joint was visualized and 44 mL of serosanguineous fluid was aspirated without any complications. Ultrasound images were saved on an internal file for later reference. The patient tolerated the procedure well and the area was cleaned and bandaged.    Procedure Note Attestation   Procedure performed by Dr. Bishop Hunt MD.      Problem List Items Addressed This Visit    None  Visit Diagnoses         Codes    Prepatellar bursitis of right knee     M70.41    Relevant Medications    predniSONE (Deltasone) 20 mg tablet    Other Relevant Orders    Point of Care Ultrasound (Completed)    Knee Sleeve            Assessment and Plan:     We reviewed the exam and x-ray findings and discussed the conservative and surgical treatment options. We agreed to do a prepatellar bursa aspiration today.  Recommended a compression sleeve throughout the day.  Prednisone ordered today in the office.  Patient is unable to do anti-inflammatories secondary to CAD.  Instructed that if this does return he should follow-up with a new surgeon for discussion about bursectomy. All questions were answered and he is agreeable to plan.  Follow-up as needed.    Patient was prescribed a knee sleeve for prepatellar bursa.The patient is ambulatory with or without aid; but, has weakness, instability  and/or deformity of their right knee which requires stabilization from this orthosis to improve their function.      Verbal and written instructions for the use, wear schedule, cleaning and application of this item were given.  Patient was instructed that should the brace result in increased pain, decreased sensation, increased swelling, or an overall worsening of their medical condition, to please contact our office immediately.     Orthotic management and training was provided for skin care, modifications due to healing tissues, edema changes, interruption in skin integrity, and safety precautions with the orthosis.    Referring physician: Dr. Noé Parra,   Primary Care Sports Medicine Fellow    **This note was dictated using Dragon speech recognition software and was not corrected for spelling or grammatical errors**.

## 2024-06-28 PROBLEM — E11.9 NEWLY DIAGNOSED DIABETES (MULTI): Status: ACTIVE | Noted: 2024-06-28

## 2024-06-28 PROBLEM — E83.52 HYPERCALCEMIA: Status: ACTIVE | Noted: 2024-06-28

## 2024-06-28 PROBLEM — E78.5 HYPERLIPOPROTEINEMIA: Status: ACTIVE | Noted: 2024-06-28

## 2024-07-03 ENCOUNTER — APPOINTMENT (OUTPATIENT)
Dept: CARDIOLOGY | Facility: CLINIC | Age: 64
End: 2024-07-03
Payer: MEDICARE

## 2024-07-11 ENCOUNTER — TELEPHONE (OUTPATIENT)
Dept: PRIMARY CARE | Facility: CLINIC | Age: 64
End: 2024-07-11
Payer: MEDICARE

## 2024-07-11 DIAGNOSIS — R11.0 NAUSEA: Primary | ICD-10-CM

## 2024-07-11 RX ORDER — ONDANSETRON 4 MG/1
4 TABLET, FILM COATED ORAL EVERY 8 HOURS PRN
Qty: 30 TABLET | Refills: 0 | Status: SHIPPED | OUTPATIENT
Start: 2024-07-11 | End: 2024-07-11 | Stop reason: SDUPTHER

## 2024-07-11 RX ORDER — ONDANSETRON 4 MG/1
4 TABLET, FILM COATED ORAL EVERY 8 HOURS PRN
Qty: 30 TABLET | Refills: 0 | Status: SHIPPED | OUTPATIENT
Start: 2024-07-11 | End: 2024-07-21

## 2024-07-15 NOTE — TELEPHONE ENCOUNTER
Had reached out last week about having some significant nausea and vomiting and some diarrhea as well  Did feel feverish  No blood noted  Has had some GI struggles in the past  He suspected it was but perhaps some food he had eaten that triggered this  Arrange to send in prescription for Zofran as noted  Did follow-up next morning he was doing appreciably better, encouraged to reach out if not really resolving through the weekend    Requested Prescriptions     Signed Prescriptions Disp Refills    ondansetron (Zofran) 4 mg tablet 30 tablet 0     Sig: Take 1 tablet (4 mg) by mouth every 8 hours if needed for nausea or vomiting for up to 10 days.     Authorizing Provider: NIKUNJ OTERO

## 2024-07-17 ENCOUNTER — TELEPHONE (OUTPATIENT)
Dept: PRIMARY CARE | Facility: CLINIC | Age: 64
End: 2024-07-17
Payer: MEDICARE

## 2024-07-17 DIAGNOSIS — R11.2 NAUSEA AND VOMITING, UNSPECIFIED VOMITING TYPE: Primary | ICD-10-CM

## 2024-07-17 RX ORDER — ONDANSETRON 4 MG/1
4-8 TABLET, ORALLY DISINTEGRATING ORAL EVERY 8 HOURS PRN
Qty: 30 TABLET | Refills: 0 | Status: SHIPPED | OUTPATIENT
Start: 2024-07-17 | End: 2024-07-24

## 2024-07-17 NOTE — TELEPHONE ENCOUNTER
Pt states he spoke with you Friday and is having the same issues he was having then (GI concerns).   He requests to speak with you to further discuss.

## 2024-07-17 NOTE — TELEPHONE ENCOUNTER
Called and d/w patient -symptoms as before  Did take the oral Zofran but could not keep down the tablets  Did better when he had dissolvable ones  No blood in stool or dark stool noted  He did use his Ozempic shot a day and a half ago  Wondering if that may be part of the problem here with getting some measure of gastroparesis encouraged him to reach out to his Endo about this and possibly switching her at least dialing down on the dose    Sent in dissolvable tablets of Zofran     Update me as need be

## 2024-07-18 ENCOUNTER — TELEPHONE (OUTPATIENT)
Dept: PRIMARY CARE | Facility: CLINIC | Age: 64
End: 2024-07-18
Payer: MEDICARE

## 2024-07-18 NOTE — TELEPHONE ENCOUNTER
Patient calling to report that he is feeling better.  Seems to have turned the corner.  Due to symptoms arising twice this week, he does want to know if you feel it would beneficial for him to see Dr. Gregg.  He requests assistance with scheduling should this be necessary.    Per discussion with Dr. Ralph, the patient advised that he should be seen by Dr. Gregg for further work-up.  LVM for Dr. Gregg's assistant requesting a return call to arrange appt.

## 2024-07-19 NOTE — TELEPHONE ENCOUNTER
I was able to get him scheduled for 8/1 at 3:30pm with Dr. Gregg at [place].  This was his soonest appointment.   Harpal is hoping for something sooner, and closer.   He is going to reach out to [facility] to see if they can help facilitate.

## 2024-07-24 ENCOUNTER — TELEPHONE (OUTPATIENT)
Dept: PRIMARY CARE | Facility: CLINIC | Age: 64
End: 2024-07-24
Payer: MEDICARE

## 2024-07-24 NOTE — TELEPHONE ENCOUNTER
"He is calling to follow-up.  He really is not feeling much better since seeing you last week.   He is not scheduled to see Dr. Gregg until 8/1.   He feels that if he continues at this level, he will end up back at [facility] or somewhere else.   He truly feels miserable.     He is waking up every morning with headaches and is nauseous around the clock.  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   He also was experiencing the chills.   He did not take Ozempic this week as he is concerned it may be causing him gastroparesis.  He feels worse after eating.  He is requesting a return call ASAP to discuss further.   "

## 2024-07-24 NOTE — TELEPHONE ENCOUNTER
Called back to patient  He has been feeling a bit feverish and took his temp and it was right around 100  He did do a COVID test which was negative  He does have a little bit of a sore throat but he thinks it might be from the gagging/vomiting he was having previously  Really needs a closer look, suggest come to the office tomorrow morning first thing for an assessment and most likely lab testing  He does have a history of diverticulitis, this is possibly a flare, but some current recommendations are to avoid use of antibiotics so would prefer to do assessment tomorrow

## 2024-07-25 ENCOUNTER — LAB (OUTPATIENT)
Dept: LAB | Facility: LAB | Age: 64
End: 2024-07-25
Payer: MEDICARE

## 2024-07-25 ENCOUNTER — OFFICE VISIT (OUTPATIENT)
Dept: PRIMARY CARE | Facility: CLINIC | Age: 64
End: 2024-07-25
Payer: MEDICARE

## 2024-07-25 VITALS
WEIGHT: 200.6 LBS | TEMPERATURE: 98.7 F | RESPIRATION RATE: 16 BRPM | SYSTOLIC BLOOD PRESSURE: 126 MMHG | HEART RATE: 81 BPM | OXYGEN SATURATION: 97 % | DIASTOLIC BLOOD PRESSURE: 77 MMHG | BODY MASS INDEX: 27.87 KG/M2

## 2024-07-25 DIAGNOSIS — R11.0 NAUSEA: ICD-10-CM

## 2024-07-25 DIAGNOSIS — E11.9 DIABETES MELLITUS TYPE II, NON INSULIN DEPENDENT (MULTI): ICD-10-CM

## 2024-07-25 DIAGNOSIS — I25.10 CORONARY ARTERY DISEASE INVOLVING NATIVE CORONARY ARTERY OF NATIVE HEART WITHOUT ANGINA PECTORIS: ICD-10-CM

## 2024-07-25 DIAGNOSIS — N18.2 CKD STAGE 2 DUE TO TYPE 2 DIABETES MELLITUS (MULTI): ICD-10-CM

## 2024-07-25 DIAGNOSIS — E11.22 CKD STAGE 2 DUE TO TYPE 2 DIABETES MELLITUS (MULTI): ICD-10-CM

## 2024-07-25 DIAGNOSIS — R07.0 THROAT PAIN: ICD-10-CM

## 2024-07-25 DIAGNOSIS — R51.9 NONINTRACTABLE HEADACHE, UNSPECIFIED CHRONICITY PATTERN, UNSPECIFIED HEADACHE TYPE: ICD-10-CM

## 2024-07-25 DIAGNOSIS — R11.0 NAUSEA: Primary | ICD-10-CM

## 2024-07-25 LAB
ALBUMIN SERPL BCP-MCNC: 4 G/DL (ref 3.4–5)
ALP SERPL-CCNC: 76 U/L (ref 33–136)
ALT SERPL W P-5'-P-CCNC: 23 U/L (ref 10–52)
ANION GAP SERPL CALC-SCNC: 16 MMOL/L (ref 10–20)
AST SERPL W P-5'-P-CCNC: 22 U/L (ref 9–39)
BASOPHILS # BLD AUTO: 0.12 X10*3/UL (ref 0–0.1)
BASOPHILS NFR BLD AUTO: 1.6 %
BILIRUB SERPL-MCNC: 0.5 MG/DL (ref 0–1.2)
BUN SERPL-MCNC: 14 MG/DL (ref 6–23)
CALCIUM SERPL-MCNC: 9.1 MG/DL (ref 8.6–10.6)
CARDIAC TROPONIN I PNL SERPL HS: 7 NG/L (ref 0–53)
CHLORIDE SERPL-SCNC: 102 MMOL/L (ref 98–107)
CHOLEST SERPL-MCNC: 90 MG/DL (ref 0–199)
CHOLESTEROL/HDL RATIO: 2.2
CO2 SERPL-SCNC: 28 MMOL/L (ref 21–32)
CREAT SERPL-MCNC: 1.25 MG/DL (ref 0.5–1.3)
CRP SERPL-MCNC: 0.64 MG/DL
EGFRCR SERPLBLD CKD-EPI 2021: 64 ML/MIN/1.73M*2
EOSINOPHIL # BLD AUTO: 0.16 X10*3/UL (ref 0–0.7)
EOSINOPHIL NFR BLD AUTO: 2.1 %
ERYTHROCYTE [DISTWIDTH] IN BLOOD BY AUTOMATED COUNT: 14 % (ref 11.5–14.5)
ERYTHROCYTE [SEDIMENTATION RATE] IN BLOOD BY WESTERGREN METHOD: 18 MM/H (ref 0–20)
EST. AVERAGE GLUCOSE BLD GHB EST-MCNC: 166 MG/DL
GLUCOSE SERPL-MCNC: 97 MG/DL (ref 74–99)
HBA1C MFR BLD: 7.4 %
HCT VFR BLD AUTO: 52.6 % (ref 41–52)
HDLC SERPL-MCNC: 40.6 MG/DL
HGB BLD-MCNC: 17.1 G/DL (ref 13.5–17.5)
IMM GRANULOCYTES # BLD AUTO: 0.02 X10*3/UL (ref 0–0.7)
IMM GRANULOCYTES NFR BLD AUTO: 0.3 % (ref 0–0.9)
LDLC SERPL CALC-MCNC: 34 MG/DL
LIPASE SERPL-CCNC: 48 U/L (ref 9–82)
LYMPHOCYTES # BLD AUTO: 1.32 X10*3/UL (ref 1.2–4.8)
LYMPHOCYTES NFR BLD AUTO: 17.3 %
MCH RBC QN AUTO: 28.9 PG (ref 26–34)
MCHC RBC AUTO-ENTMCNC: 32.5 G/DL (ref 32–36)
MCV RBC AUTO: 89 FL (ref 80–100)
MONOCYTES # BLD AUTO: 1.07 X10*3/UL (ref 0.1–1)
MONOCYTES NFR BLD AUTO: 14 %
NEUTROPHILS # BLD AUTO: 4.95 X10*3/UL (ref 1.2–7.7)
NEUTROPHILS NFR BLD AUTO: 64.7 %
NON HDL CHOLESTEROL: 49 MG/DL (ref 0–149)
NRBC BLD-RTO: 0 /100 WBCS (ref 0–0)
PLATELET # BLD AUTO: 205 X10*3/UL (ref 150–450)
POTASSIUM SERPL-SCNC: 4.5 MMOL/L (ref 3.5–5.3)
PROT SERPL-MCNC: 6.7 G/DL (ref 6.4–8.2)
RBC # BLD AUTO: 5.92 X10*6/UL (ref 4.5–5.9)
SODIUM SERPL-SCNC: 141 MMOL/L (ref 136–145)
TRIGL SERPL-MCNC: 77 MG/DL (ref 0–149)
VLDL: 15 MG/DL (ref 0–40)
WBC # BLD AUTO: 7.6 X10*3/UL (ref 4.4–11.3)

## 2024-07-25 PROCEDURE — 4010F ACE/ARB THERAPY RXD/TAKEN: CPT | Performed by: FAMILY MEDICINE

## 2024-07-25 PROCEDURE — 86140 C-REACTIVE PROTEIN: CPT

## 2024-07-25 PROCEDURE — 83690 ASSAY OF LIPASE: CPT

## 2024-07-25 PROCEDURE — 1036F TOBACCO NON-USER: CPT | Performed by: FAMILY MEDICINE

## 2024-07-25 PROCEDURE — 85025 COMPLETE CBC W/AUTO DIFF WBC: CPT

## 2024-07-25 PROCEDURE — 80053 COMPREHEN METABOLIC PANEL: CPT

## 2024-07-25 PROCEDURE — 3048F LDL-C <100 MG/DL: CPT | Performed by: FAMILY MEDICINE

## 2024-07-25 PROCEDURE — 83036 HEMOGLOBIN GLYCOSYLATED A1C: CPT

## 2024-07-25 PROCEDURE — 3044F HG A1C LEVEL LT 7.0%: CPT | Performed by: FAMILY MEDICINE

## 2024-07-25 PROCEDURE — 80061 LIPID PANEL: CPT

## 2024-07-25 PROCEDURE — 3078F DIAST BP <80 MM HG: CPT | Performed by: FAMILY MEDICINE

## 2024-07-25 PROCEDURE — 1159F MED LIST DOCD IN RCRD: CPT | Performed by: FAMILY MEDICINE

## 2024-07-25 PROCEDURE — 99215 OFFICE O/P EST HI 40 MIN: CPT | Performed by: FAMILY MEDICINE

## 2024-07-25 PROCEDURE — 85652 RBC SED RATE AUTOMATED: CPT

## 2024-07-25 PROCEDURE — 3074F SYST BP LT 130 MM HG: CPT | Performed by: FAMILY MEDICINE

## 2024-07-25 PROCEDURE — 84484 ASSAY OF TROPONIN QUANT: CPT

## 2024-07-25 PROCEDURE — 36415 COLL VENOUS BLD VENIPUNCTURE: CPT

## 2024-07-25 NOTE — PROGRESS NOTES
"Subjective   Patient ID: Harpal Covarrubias is a 64 y.o. male who presents for Nausea, Headache, and GI Problem.  HPI  Had an issue about 2 weeks ago   He had reached out to me about developing some significant nausea and vomiting as well as some diarrhea  Also seem to be getting some constitutional symptoms like fever and chills  Does have a bit of a loss of appetite  He had had some bouts in the past, but these seem to resolve in a day or so with use of Zofran  He has developed some sore throat and not sure if that was infectious related or something brought on by some of the vomiting he has had  Continues to have a low-grade temp, so he took 2 COVID tests at home both of which were negative  Bowel function still not right indicates area of discomfort mostly in upper abdominal area -he does take Prevacid, but has tried using Tums and Pepto-Bismol which do not seem to alleviate this discomfort  He does have a prior history of diverticulitis, but does not have any abdominal tenderness  He has an appointment to see Dr. Gregg but it is not until next week  In light of his prior history of needing a stent, he does not note any significant heart racing or skipping a beat, does have some chest discomfort at times but mostly right-sided sternal area, no significant extremity swelling reported  Feels rather \"wiped out\"    Review of Systems  Essentially noncontributory otherwise    Objective   /77   Pulse 81   Temp 37.1 °C (98.7 °F)   Resp 16   Wt 91 kg (200 lb 9.6 oz) Comment: no shoes  SpO2 97%   BMI 27.87 kg/m²     Physical Exam  General: Appears mild to moderately ill  HEENT: No conjunctival injection or scleral icterus, oropharynx clear without marked erythema or lesions seen  Neck: No significant anterior cervical chain lymphadenopathy  Lungs: Clear to auscultation without wheeze or cough or crackles  Cardiac: Regular rate and rhythm, normal S1-S2 without S3-S4  Abdomen: Soft, nontender nondistended, " positive bowel sounds,  Extremities: Appear well-perfused with slight lower extremity edema  Assessment/Plan   Problem List Items Addressed This Visit    None  Visit Diagnoses       Nausea    -  Primary    Relevant Orders    C-Reactive Protein    Comprehensive Metabolic Panel    Lipase    Sedimentation Rate    CBC and Auto Differential    Troponin I, High Sensitivity    Nonintractable headache, unspecified chronicity pattern, unspecified headache type        Relevant Orders    C-Reactive Protein    Comprehensive Metabolic Panel    Lipase    Sedimentation Rate    CBC and Auto Differential    Troponin I, High Sensitivity    Throat pain            Patient was given 4 mg dose of Zofran and 400 mg Ibuprofen in office    Await labs   May need imaging   Suggest taking additional dose of Prevacid when he gets home    TVT of 42 minutes with greater than 50% spent FTF in assessment/discussion and care coordination

## 2024-07-26 ENCOUNTER — TELEPHONE (OUTPATIENT)
Dept: PRIMARY CARE | Facility: CLINIC | Age: 64
End: 2024-07-26
Payer: MEDICARE

## 2024-07-26 DIAGNOSIS — R05.9 COUGH, UNSPECIFIED TYPE: Primary | ICD-10-CM

## 2024-07-26 DIAGNOSIS — R05.9 COUGH, UNSPECIFIED TYPE: ICD-10-CM

## 2024-07-26 DIAGNOSIS — J01.10 ACUTE FRONTAL SINUSITIS, RECURRENCE NOT SPECIFIED: Primary | ICD-10-CM

## 2024-07-26 RX ORDER — PROMETHAZINE HYDROCHLORIDE 6.25 MG/5ML
6.25-12.5 SYRUP ORAL EVERY 6 HOURS PRN
Qty: 120 ML | Refills: 0 | Status: SHIPPED | OUTPATIENT
Start: 2024-07-26 | End: 2024-08-02

## 2024-07-26 RX ORDER — PROMETHAZINE HYDROCHLORIDE AND DEXTROMETHORPHAN HYDROBROMIDE 6.25; 15 MG/5ML; MG/5ML
5-10 SYRUP ORAL 4 TIMES DAILY PRN
Qty: 120 ML | Refills: 0 | Status: SHIPPED | OUTPATIENT
Start: 2024-07-26 | End: 2024-08-25

## 2024-07-26 RX ORDER — DOXYCYCLINE 100 MG/1
100 TABLET ORAL 2 TIMES DAILY
Qty: 14 TABLET | Refills: 0 | Status: SHIPPED | OUTPATIENT
Start: 2024-07-26 | End: 2024-08-02

## 2024-07-26 NOTE — PROGRESS NOTES
Store does not have what was ordered - will change to the following     Requested Prescriptions     Signed Prescriptions Disp Refills    promethazine-DM (Phenergan-DM) 6.25-15 mg/5 mL syrup 120 mL 0     Sig: Take 5-10 mL by mouth 4 times a day as needed for cough.

## 2024-07-26 NOTE — TELEPHONE ENCOUNTER
Called patient and noted that his lab tests were very reassuring  No clear evidence of cardiac issues or significant inflammatory process or overwhelming infectious process  Does feel his nausea is little better, but his cough frequency sounds like it is increased and has become a bit productive  He wonders if this is connected to his headache  He does have some frontal sinus pressure  Certainly would explain his fever he is  Will start the following and update us if really not having made progress through the weekend    Requested Prescriptions     Signed Prescriptions Disp Refills    doxycycline (Adoxa) 100 mg tablet 14 tablet 0     Sig: Take 1 tablet (100 mg) by mouth 2 times a day for 7 days. Take with a full glass of water     Authorizing Provider: NIKUNJ OTERO    promethazine (Phenergan) 6.25 mg/5 mL syrup 120 mL 0     Sig: Take 5-10 mL (6.25-12.5 mg) by mouth every 6 hours if needed (cough) for up to 7 days.     Authorizing Provider: NIKUNJ OTERO

## 2024-08-01 ENCOUNTER — APPOINTMENT (OUTPATIENT)
Dept: GASTROENTEROLOGY | Facility: CLINIC | Age: 64
End: 2024-08-01
Payer: MEDICARE

## 2024-08-04 ENCOUNTER — TELEPHONE (OUTPATIENT)
Dept: PRIMARY CARE | Facility: CLINIC | Age: 64
End: 2024-08-04
Payer: MEDICARE

## 2024-08-04 DIAGNOSIS — J22 LRTI (LOWER RESPIRATORY TRACT INFECTION): Primary | ICD-10-CM

## 2024-08-04 RX ORDER — AZITHROMYCIN 250 MG/1
TABLET, FILM COATED ORAL
Qty: 6 TABLET | Refills: 0 | Status: SHIPPED | OUTPATIENT
Start: 2024-08-04 | End: 2024-08-09

## 2024-08-04 NOTE — TELEPHONE ENCOUNTER
Reached out about still not feeling great  Had some improvement with the doxycycline, but still bringing up some discolored phlegm, question increased fatigue  Seems to be more of a chest phenomenon than a head/sinus thing  New marked throat pain  No other wheezing  No fever or chills reported, but still not back to baseline  His nausea has really subsided and he stayed away from the Ozempic  Discussed a trial of the following    Requested Prescriptions     Signed Prescriptions Disp Refills    azithromycin (Zithromax) 250 mg tablet 6 tablet 0     Sig: Take 2 tablets (500 mg) by mouth once daily for 1 day, THEN 1 tablet (250 mg) once daily for 4 days.     Authorizing Provider: NIKUNJ OTERO       Expect will follow-up in 48-72 hours or sooner if worsening/concerns

## 2024-08-22 ENCOUNTER — APPOINTMENT (OUTPATIENT)
Dept: GASTROENTEROLOGY | Facility: CLINIC | Age: 64
End: 2024-08-22
Payer: MEDICARE

## 2024-09-04 ENCOUNTER — APPOINTMENT (OUTPATIENT)
Dept: CARDIOLOGY | Facility: CLINIC | Age: 64
End: 2024-09-04
Payer: MEDICARE

## 2024-09-11 DIAGNOSIS — I10 ESSENTIAL HYPERTENSION: ICD-10-CM

## 2024-09-11 RX ORDER — CANDESARTAN 8 MG/1
8 TABLET ORAL DAILY
Qty: 90 TABLET | Refills: 3 | Status: SHIPPED | OUTPATIENT
Start: 2024-09-11

## 2024-09-12 ENCOUNTER — APPOINTMENT (OUTPATIENT)
Dept: GASTROENTEROLOGY | Facility: CLINIC | Age: 64
End: 2024-09-12
Payer: MEDICARE

## 2024-10-20 DIAGNOSIS — I21.4 NON-ST ELEVATION MYOCARDIAL INFARCTION (NSTEMI), SUBSEQUENT EPISODE OF CARE (MULTI): ICD-10-CM

## 2024-10-20 DIAGNOSIS — I10 ESSENTIAL HYPERTENSION: ICD-10-CM

## 2024-10-20 DIAGNOSIS — I25.10 CORONARY ARTERY DISEASE INVOLVING NATIVE CORONARY ARTERY OF NATIVE HEART WITHOUT ANGINA PECTORIS: ICD-10-CM

## 2024-10-20 RX ORDER — METOPROLOL SUCCINATE 25 MG/1
12.5 TABLET, EXTENDED RELEASE ORAL DAILY
Qty: 45 TABLET | Refills: 3 | Status: SHIPPED | OUTPATIENT
Start: 2024-10-20

## 2024-10-21 RX ORDER — CLOPIDOGREL BISULFATE 75 MG/1
75 TABLET ORAL DAILY
Qty: 90 TABLET | Refills: 3 | Status: SHIPPED | OUTPATIENT
Start: 2024-10-21 | End: 2025-10-21

## 2024-11-01 ENCOUNTER — HOSPITAL ENCOUNTER (OUTPATIENT)
Dept: RADIOLOGY | Facility: CLINIC | Age: 64
Discharge: HOME | End: 2024-11-01
Payer: MEDICARE

## 2024-11-01 ENCOUNTER — OFFICE VISIT (OUTPATIENT)
Dept: ORTHOPEDIC SURGERY | Facility: CLINIC | Age: 64
End: 2024-11-01
Payer: MEDICARE

## 2024-11-01 VITALS — WEIGHT: 200 LBS | BODY MASS INDEX: 26.51 KG/M2 | HEIGHT: 73 IN

## 2024-11-01 DIAGNOSIS — R52 PAIN: ICD-10-CM

## 2024-11-01 DIAGNOSIS — M79.643 PAIN OF HAND, UNSPECIFIED LATERALITY: Primary | ICD-10-CM

## 2024-11-01 DIAGNOSIS — M79.89 SWELLING OF HAND, UNSPECIFIED LATERALITY: ICD-10-CM

## 2024-11-01 DIAGNOSIS — S60.221A CONTUSION OF DORSUM OF RIGHT HAND: ICD-10-CM

## 2024-11-01 DIAGNOSIS — M79.643 PAIN OF HAND, UNSPECIFIED LATERALITY: ICD-10-CM

## 2024-11-01 PROCEDURE — 1160F RVW MEDS BY RX/DR IN RCRD: CPT | Performed by: ORTHOPAEDIC SURGERY

## 2024-11-01 PROCEDURE — 73130 X-RAY EXAM OF HAND: CPT | Mod: RT

## 2024-11-01 PROCEDURE — 99213 OFFICE O/P EST LOW 20 MIN: CPT | Performed by: ORTHOPAEDIC SURGERY

## 2024-11-01 PROCEDURE — 3008F BODY MASS INDEX DOCD: CPT | Performed by: ORTHOPAEDIC SURGERY

## 2024-11-01 PROCEDURE — 1125F AMNT PAIN NOTED PAIN PRSNT: CPT | Performed by: ORTHOPAEDIC SURGERY

## 2024-11-01 PROCEDURE — 4010F ACE/ARB THERAPY RXD/TAKEN: CPT | Performed by: ORTHOPAEDIC SURGERY

## 2024-11-01 PROCEDURE — 3048F LDL-C <100 MG/DL: CPT | Performed by: ORTHOPAEDIC SURGERY

## 2024-11-01 PROCEDURE — 1159F MED LIST DOCD IN RCRD: CPT | Performed by: ORTHOPAEDIC SURGERY

## 2024-11-01 PROCEDURE — 1036F TOBACCO NON-USER: CPT | Performed by: ORTHOPAEDIC SURGERY

## 2024-11-01 PROCEDURE — 3051F HG A1C>EQUAL 7.0%<8.0%: CPT | Performed by: ORTHOPAEDIC SURGERY

## 2024-11-01 PROCEDURE — 99203 OFFICE O/P NEW LOW 30 MIN: CPT | Performed by: ORTHOPAEDIC SURGERY

## 2024-11-01 ASSESSMENT — LIFESTYLE VARIABLES
HOW OFTEN DURING THE LAST YEAR HAVE YOU FOUND THAT YOU WERE NOT ABLE TO STOP DRINKING ONCE YOU HAD STARTED: NEVER
HOW MANY STANDARD DRINKS CONTAINING ALCOHOL DO YOU HAVE ON A TYPICAL DAY: PATIENT DOES NOT DRINK
HOW OFTEN DO YOU HAVE SIX OR MORE DRINKS ON ONE OCCASION: NEVER
AUDIT-C TOTAL SCORE: 0
HOW OFTEN DURING THE LAST YEAR HAVE YOU NEEDED AN ALCOHOLIC DRINK FIRST THING IN THE MORNING TO GET YOURSELF GOING AFTER A NIGHT OF HEAVY DRINKING: NEVER
HOW OFTEN DURING THE LAST YEAR HAVE YOU BEEN UNABLE TO REMEMBER WHAT HAPPENED THE NIGHT BEFORE BECAUSE YOU HAD BEEN DRINKING: NEVER
HOW OFTEN DO YOU HAVE A DRINK CONTAINING ALCOHOL: NEVER
HOW OFTEN DURING THE LAST YEAR HAVE YOU HAD A FEELING OF GUILT OR REMORSE AFTER DRINKING: NEVER
HAVE YOU OR SOMEONE ELSE BEEN INJURED AS A RESULT OF YOUR DRINKING: NO
HAS A RELATIVE, FRIEND, DOCTOR, OR ANOTHER HEALTH PROFESSIONAL EXPRESSED CONCERN ABOUT YOUR DRINKING OR SUGGESTED YOU CUT DOWN: NO
SKIP TO QUESTIONS 9-10: 1
HOW OFTEN DURING THE LAST YEAR HAVE YOU FAILED TO DO WHAT WAS NORMALLY EXPECTED FROM YOU BECAUSE OF DRINKING: NEVER
AUDIT TOTAL SCORE: 0

## 2024-11-01 ASSESSMENT — PAIN SCALES - GENERAL
PAINLEVEL_OUTOF10: 2
PAINLEVEL_OUTOF10: 2

## 2024-11-01 ASSESSMENT — ENCOUNTER SYMPTOMS
LOSS OF SENSATION IN FEET: 0
DEPRESSION: 0
OCCASIONAL FEELINGS OF UNSTEADINESS: 0

## 2024-11-01 ASSESSMENT — PATIENT HEALTH QUESTIONNAIRE - PHQ9
SUM OF ALL RESPONSES TO PHQ9 QUESTIONS 1 AND 2: 0
2. FEELING DOWN, DEPRESSED OR HOPELESS: NOT AT ALL
1. LITTLE INTEREST OR PLEASURE IN DOING THINGS: NOT AT ALL

## 2024-11-01 ASSESSMENT — PAIN - FUNCTIONAL ASSESSMENT: PAIN_FUNCTIONAL_ASSESSMENT: 0-10

## 2024-11-01 ASSESSMENT — PAIN DESCRIPTION - DESCRIPTORS: DESCRIPTORS: ACHING

## 2024-11-01 ASSESSMENT — COLUMBIA-SUICIDE SEVERITY RATING SCALE - C-SSRS
6. HAVE YOU EVER DONE ANYTHING, STARTED TO DO ANYTHING, OR PREPARED TO DO ANYTHING TO END YOUR LIFE?: NO
1. IN THE PAST MONTH, HAVE YOU WISHED YOU WERE DEAD OR WISHED YOU COULD GO TO SLEEP AND NOT WAKE UP?: NO
2. HAVE YOU ACTUALLY HAD ANY THOUGHTS OF KILLING YOURSELF?: NO

## 2024-11-15 ENCOUNTER — TELEPHONE (OUTPATIENT)
Dept: PRIMARY CARE | Facility: CLINIC | Age: 64
End: 2024-11-15
Payer: MEDICARE

## 2024-11-15 DIAGNOSIS — E11.3293 MILD NONPROLIFERATIVE DIABETIC RETINOPATHY OF BOTH EYES ASSOCIATED WITH TYPE 2 DIABETES MELLITUS, MACULAR EDEMA PRESENCE UNSPECIFIED: ICD-10-CM

## 2024-11-15 RX ORDER — SEMAGLUTIDE 1.34 MG/ML
1 INJECTION, SOLUTION SUBCUTANEOUS WEEKLY
Qty: 9 ML | Refills: 3 | Status: SHIPPED | OUTPATIENT
Start: 2024-11-15

## 2024-11-15 NOTE — TELEPHONE ENCOUNTER
Requested Prescriptions     Signed Prescriptions Disp Refills    semaglutide (Ozempic) 1 mg/dose (4 mg/3 mL) pen injector 9 mL 3     Sig: Inject 1 mg under the skin 1 (one) time per week.     Authorizing Provider: NIKUNJ OTERO     Also - he is looking to have his wife join [employer] - noted we are in process of finding another female provider - will check with Dr Montana

## 2024-11-15 NOTE — TELEPHONE ENCOUNTER
1)  He is going to be traveling and needs RX for Ozempic 1 mg sent in to [facility] [place].    2)  He has a couple of questions for you - hoping to speak with you when you have a moment.  Nothing urgent.

## 2024-11-15 NOTE — TELEPHONE ENCOUNTER
1)  He is going to be traveling and needs RX for Ozempic 1 mg sent in to [facility] [place].    2)  He has a couple of questions for you - hoping to speak with you when you have a moment.  Nothing urgent.

## 2024-11-18 DIAGNOSIS — I10 ESSENTIAL HYPERTENSION: ICD-10-CM

## 2024-11-18 RX ORDER — CANDESARTAN 8 MG/1
8 TABLET ORAL DAILY
Qty: 90 TABLET | Refills: 3 | Status: SHIPPED | OUTPATIENT
Start: 2024-11-18

## 2024-11-27 ENCOUNTER — APPOINTMENT (OUTPATIENT)
Dept: CARDIOLOGY | Facility: CLINIC | Age: 64
End: 2024-11-27
Payer: MEDICARE

## 2024-12-05 ENCOUNTER — TELEPHONE (OUTPATIENT)
Dept: PRIMARY CARE | Facility: CLINIC | Age: 64
End: 2024-12-05
Payer: MEDICARE

## 2024-12-05 DIAGNOSIS — J22 LRTI (LOWER RESPIRATORY TRACT INFECTION): Primary | ICD-10-CM

## 2024-12-05 RX ORDER — DOXYCYCLINE 100 MG/1
100 TABLET ORAL 2 TIMES DAILY
Qty: 14 TABLET | Refills: 0 | Status: SHIPPED | OUTPATIENT
Start: 2024-12-05 | End: 2024-12-12

## 2024-12-06 NOTE — TELEPHONE ENCOUNTER
Patient reached out to let know he has been having respiratory issues for a good week now  Still getting some discolored phlegm  Difficult to tell if it is exactly coming from his upper respiratory tract or lower respiratory tract  Sounds like he is lower on energy than usual to  No significant throat pain or ear pain  No fever or chills noted  However given his diabetes and history, seems appropriate given the duration of symptoms and their nature to treat with the following  Requested Prescriptions     Signed Prescriptions Disp Refills    doxycycline (Adoxa) 100 mg tablet 14 tablet 0     Sig: Take 1 tablet (100 mg) by mouth 2 times a day for 7 days. Take with a full glass of water     Authorizing Provider: NIKUNJ OTERO       Expect will follow-up in 48-72 hours or sooner if worsening/concerns

## 2024-12-10 ENCOUNTER — APPOINTMENT (OUTPATIENT)
Dept: CARDIOLOGY | Facility: HOSPITAL | Age: 64
End: 2024-12-10
Payer: MEDICARE

## 2024-12-13 ENCOUNTER — HOSPITAL ENCOUNTER (EMERGENCY)
Facility: HOSPITAL | Age: 64
Discharge: HOME | End: 2024-12-13
Attending: STUDENT IN AN ORGANIZED HEALTH CARE EDUCATION/TRAINING PROGRAM
Payer: MEDICARE

## 2024-12-13 ENCOUNTER — APPOINTMENT (OUTPATIENT)
Dept: CARDIOLOGY | Facility: HOSPITAL | Age: 64
End: 2024-12-13
Payer: MEDICARE

## 2024-12-13 VITALS
TEMPERATURE: 97.1 F | OXYGEN SATURATION: 94 % | SYSTOLIC BLOOD PRESSURE: 130 MMHG | RESPIRATION RATE: 16 BRPM | HEIGHT: 73 IN | BODY MASS INDEX: 26.51 KG/M2 | WEIGHT: 200 LBS | DIASTOLIC BLOOD PRESSURE: 76 MMHG | HEART RATE: 80 BPM

## 2024-12-13 DIAGNOSIS — R10.9 ABDOMINAL PAIN, UNSPECIFIED ABDOMINAL LOCATION: ICD-10-CM

## 2024-12-13 DIAGNOSIS — R11.2 NAUSEA AND VOMITING, UNSPECIFIED VOMITING TYPE: Primary | ICD-10-CM

## 2024-12-13 LAB
ALBUMIN SERPL BCP-MCNC: 4.4 G/DL (ref 3.4–5)
ALP SERPL-CCNC: 98 U/L (ref 33–136)
ALT SERPL W P-5'-P-CCNC: 29 U/L (ref 10–52)
ANION GAP BLDV CALCULATED.4IONS-SCNC: 16 MMOL/L (ref 10–25)
ANION GAP SERPL CALC-SCNC: 17 MMOL/L (ref 10–20)
AST SERPL W P-5'-P-CCNC: 26 U/L (ref 9–39)
BASE EXCESS BLDV CALC-SCNC: 1.8 MMOL/L (ref -2–3)
BASOPHILS # BLD AUTO: 0.07 X10*3/UL (ref 0–0.1)
BASOPHILS NFR BLD AUTO: 0.8 %
BILIRUB SERPL-MCNC: 1.1 MG/DL (ref 0–1.2)
BODY TEMPERATURE: 37 DEGREES CELSIUS
BUN SERPL-MCNC: 22 MG/DL (ref 6–23)
CA-I BLDV-SCNC: 1.23 MMOL/L (ref 1.1–1.33)
CALCIUM SERPL-MCNC: 10.1 MG/DL (ref 8.6–10.3)
CARDIAC TROPONIN I PNL SERPL HS: 15 NG/L (ref 0–20)
CHLORIDE BLDV-SCNC: 99 MMOL/L (ref 98–107)
CHLORIDE SERPL-SCNC: 102 MMOL/L (ref 98–107)
CO2 SERPL-SCNC: 26 MMOL/L (ref 21–32)
CREAT SERPL-MCNC: 1.27 MG/DL (ref 0.5–1.3)
EGFRCR SERPLBLD CKD-EPI 2021: 63 ML/MIN/1.73M*2
EOSINOPHIL # BLD AUTO: 0.07 X10*3/UL (ref 0–0.7)
EOSINOPHIL NFR BLD AUTO: 0.8 %
ERYTHROCYTE [DISTWIDTH] IN BLOOD BY AUTOMATED COUNT: 13.5 % (ref 11.5–14.5)
GLUCOSE BLD MANUAL STRIP-MCNC: 88 MG/DL (ref 74–99)
GLUCOSE BLDV-MCNC: 102 MG/DL (ref 74–99)
GLUCOSE SERPL-MCNC: 93 MG/DL (ref 74–99)
HCO3 BLDV-SCNC: 25.8 MMOL/L (ref 22–26)
HCT VFR BLD AUTO: 56.9 % (ref 41–52)
HCT VFR BLD EST: 59 % (ref 41–52)
HGB BLD-MCNC: 18.7 G/DL (ref 13.5–17.5)
HGB BLDV-MCNC: 19.6 G/DL (ref 13.5–17.5)
IMM GRANULOCYTES # BLD AUTO: 0.03 X10*3/UL (ref 0–0.7)
IMM GRANULOCYTES NFR BLD AUTO: 0.3 % (ref 0–0.9)
INHALED O2 CONCENTRATION: 21 %
LACTATE BLDV-SCNC: 1.9 MMOL/L (ref 0.4–2)
LIPASE SERPL-CCNC: 21 U/L (ref 9–82)
LYMPHOCYTES # BLD AUTO: 1.33 X10*3/UL (ref 1.2–4.8)
LYMPHOCYTES NFR BLD AUTO: 15.1 %
MAGNESIUM SERPL-MCNC: 1.88 MG/DL (ref 1.6–2.4)
MCH RBC QN AUTO: 28.5 PG (ref 26–34)
MCHC RBC AUTO-ENTMCNC: 32.9 G/DL (ref 32–36)
MCV RBC AUTO: 87 FL (ref 80–100)
MONOCYTES # BLD AUTO: 0.47 X10*3/UL (ref 0.1–1)
MONOCYTES NFR BLD AUTO: 5.3 %
NEUTROPHILS # BLD AUTO: 6.85 X10*3/UL (ref 1.2–7.7)
NEUTROPHILS NFR BLD AUTO: 77.7 %
NRBC BLD-RTO: 0 /100 WBCS (ref 0–0)
OXYHGB MFR BLDV: 70.3 % (ref 45–75)
PCO2 BLDV: 38 MM HG (ref 41–51)
PH BLDV: 7.44 PH (ref 7.33–7.43)
PLATELET # BLD AUTO: 216 X10*3/UL (ref 150–450)
PO2 BLDV: 41 MM HG (ref 35–45)
POTASSIUM BLDV-SCNC: 4.4 MMOL/L (ref 3.5–5.3)
POTASSIUM SERPL-SCNC: 4.5 MMOL/L (ref 3.5–5.3)
PROT SERPL-MCNC: 7.4 G/DL (ref 6.4–8.2)
RBC # BLD AUTO: 6.57 X10*6/UL (ref 4.5–5.9)
SAO2 % BLDV: 73 % (ref 45–75)
SODIUM BLDV-SCNC: 136 MMOL/L (ref 136–145)
SODIUM SERPL-SCNC: 140 MMOL/L (ref 136–145)
WBC # BLD AUTO: 8.8 X10*3/UL (ref 4.4–11.3)

## 2024-12-13 PROCEDURE — 93005 ELECTROCARDIOGRAM TRACING: CPT

## 2024-12-13 PROCEDURE — 83690 ASSAY OF LIPASE: CPT

## 2024-12-13 PROCEDURE — 2500000004 HC RX 250 GENERAL PHARMACY W/ HCPCS (ALT 636 FOR OP/ED): Performed by: STUDENT IN AN ORGANIZED HEALTH CARE EDUCATION/TRAINING PROGRAM

## 2024-12-13 PROCEDURE — 2500000001 HC RX 250 WO HCPCS SELF ADMINISTERED DRUGS (ALT 637 FOR MEDICARE OP)

## 2024-12-13 PROCEDURE — 84484 ASSAY OF TROPONIN QUANT: CPT

## 2024-12-13 PROCEDURE — 84075 ASSAY ALKALINE PHOSPHATASE: CPT

## 2024-12-13 PROCEDURE — 84132 ASSAY OF SERUM POTASSIUM: CPT

## 2024-12-13 PROCEDURE — 2500000004 HC RX 250 GENERAL PHARMACY W/ HCPCS (ALT 636 FOR OP/ED)

## 2024-12-13 PROCEDURE — 82947 ASSAY GLUCOSE BLOOD QUANT: CPT

## 2024-12-13 PROCEDURE — 96375 TX/PRO/DX INJ NEW DRUG ADDON: CPT

## 2024-12-13 PROCEDURE — 99284 EMERGENCY DEPT VISIT MOD MDM: CPT | Performed by: STUDENT IN AN ORGANIZED HEALTH CARE EDUCATION/TRAINING PROGRAM

## 2024-12-13 PROCEDURE — 96374 THER/PROPH/DIAG INJ IV PUSH: CPT

## 2024-12-13 PROCEDURE — 83735 ASSAY OF MAGNESIUM: CPT

## 2024-12-13 PROCEDURE — 96361 HYDRATE IV INFUSION ADD-ON: CPT

## 2024-12-13 PROCEDURE — 36415 COLL VENOUS BLD VENIPUNCTURE: CPT

## 2024-12-13 PROCEDURE — 85025 COMPLETE CBC W/AUTO DIFF WBC: CPT

## 2024-12-13 RX ORDER — FAMOTIDINE 10 MG/ML
20 INJECTION INTRAVENOUS ONCE
Status: COMPLETED | OUTPATIENT
Start: 2024-12-13 | End: 2024-12-13

## 2024-12-13 RX ORDER — ACETAMINOPHEN 325 MG/1
975 TABLET ORAL ONCE
Status: COMPLETED | OUTPATIENT
Start: 2024-12-13 | End: 2024-12-13

## 2024-12-13 RX ORDER — METOCLOPRAMIDE HYDROCHLORIDE 5 MG/ML
10 INJECTION INTRAMUSCULAR; INTRAVENOUS ONCE
Status: COMPLETED | OUTPATIENT
Start: 2024-12-13 | End: 2024-12-13

## 2024-12-13 RX ORDER — ONDANSETRON HYDROCHLORIDE 2 MG/ML
4 INJECTION, SOLUTION INTRAVENOUS ONCE
Status: COMPLETED | OUTPATIENT
Start: 2024-12-13 | End: 2024-12-13

## 2024-12-13 RX ORDER — METOCLOPRAMIDE 10 MG/1
10 TABLET ORAL EVERY 8 HOURS PRN
Qty: 21 TABLET | Refills: 0 | Status: SHIPPED | OUTPATIENT
Start: 2024-12-13 | End: 2024-12-20

## 2024-12-13 ASSESSMENT — PAIN SCALES - GENERAL
PAINLEVEL_OUTOF10: 3
PAINLEVEL_OUTOF10: 4

## 2024-12-13 ASSESSMENT — COLUMBIA-SUICIDE SEVERITY RATING SCALE - C-SSRS
2. HAVE YOU ACTUALLY HAD ANY THOUGHTS OF KILLING YOURSELF?: NO
1. IN THE PAST MONTH, HAVE YOU WISHED YOU WERE DEAD OR WISHED YOU COULD GO TO SLEEP AND NOT WAKE UP?: NO
6. HAVE YOU EVER DONE ANYTHING, STARTED TO DO ANYTHING, OR PREPARED TO DO ANYTHING TO END YOUR LIFE?: NO

## 2024-12-13 ASSESSMENT — PAIN DESCRIPTION - PROGRESSION: CLINICAL_PROGRESSION: NOT CHANGED

## 2024-12-13 ASSESSMENT — PAIN - FUNCTIONAL ASSESSMENT: PAIN_FUNCTIONAL_ASSESSMENT: 0-10

## 2024-12-13 NOTE — DISCHARGE INSTRUCTIONS
Seen today due to concerns of nausea and vomiting.  This may be secondary to your recent doxycycline use as this is a known side effect of antibiotics.  Please use Reglan as needed for nausea and follow-up with your PCP if you continue to have symptoms.  Your lab work otherwise looks reassuring.

## 2024-12-13 NOTE — ED PROVIDER NOTES
CC: Nausea (Pt arriving from home via EMS, pt c/o copious/frequent N/V w/ lightheadedness and dizziness and mild frontal HA since yesterday AM, denies eating anything different/strange. Pt states he was put on Doxycycline for URI a week ago, had 2 pills left. Pt denies sick contacts but states travel 3 weeks ago [place]. Reports URI nagging productive cough that is better. 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  Patient reports that he had a cough about a week and a half ago and was initiated on doxycycline by his primary physician.  He started having increased nausea and multiple episodes of emesis that were mainly food and him now turned bilious.  Patient denies any blood but does report that he had 1 episode of darker brown emesis.  He denies any diarrhea, constipation, improvement of his congestion.  He has not had any significant rhinorrhea or productive sputum.  Patient was told to stop his doxycycline 2 days ago by his primary physician and denies any worsening fevers or chills.  He has a mild headache.  He has not had any syncopal episodes, falls and denies any vertiginous symptoms.  Patient denies any focal chest pain or shortness of breath at this time.  He has been taking multiple doses of Zofran and continues to have nausea.    Limitations to History: none  Additional History Obtained from: patient alone    PMHx/PSHx:  Per HPI.   - has a past medical history of Cardiovascular disease, Myocardial infarction (Multi), and Type 2 diabetes mellitus.  - has a past surgical history that includes Other surgical history (10/14/2022) and Other surgical history (10/14/2022).    Social History:  - Tobacco:  reports that he has  never smoked. He has never been exposed to tobacco smoke. He has never used smokeless tobacco.   - Alcohol:  reports that he does not currently use alcohol.   - Drugs:  reports that he does not currently use drugs.     Medications: Reviewed in EMR.     Allergies:  Nitroglycerin    ???????????????????????????????????????????????????????????????  Triage Vitals:  T 36.1 °C (97 °F)  HR 90  /86  RR 18  O2 95 % None (Room air)    Physical Exam  Nursing note reviewed.   Constitutional:       Appearance: Normal appearance.   HENT:      Head: Normocephalic and atraumatic.      Nose: No congestion or rhinorrhea.      Mouth/Throat:      Pharynx: Oropharynx is clear.   Eyes:      General: No scleral icterus.     Extraocular Movements: Extraocular movements intact.      Conjunctiva/sclera: Conjunctivae normal.      Pupils: Pupils are equal, round, and reactive to light.   Neck:      Vascular: No carotid bruit.   Cardiovascular:      Rate and Rhythm: Normal rate and regular rhythm.      Pulses: Normal pulses.      Heart sounds: Normal heart sounds. No murmur heard.     No friction rub. No gallop.   Pulmonary:      Effort: Pulmonary effort is normal. No respiratory distress.      Breath sounds: Normal breath sounds. No wheezing, rhonchi or rales.   Abdominal:      General: Abdomen is flat. There is no distension.      Palpations: Abdomen is soft.      Tenderness: There is no abdominal tenderness. There is no guarding.   Musculoskeletal:         General: No swelling or tenderness. Normal range of motion.      Cervical back: Normal range of motion and neck supple.   Skin:     General: Skin is warm and dry.      Capillary Refill: Capillary refill takes less than 2 seconds.   Neurological:      General: No focal deficit present.      Mental Status: He is alert and oriented to person, place, and time.       ???????????????????????????????????????????????????????????????  EKG (per my interpretation): See ED course    ED  Course  ED Course as of 12/13/24 1317   Fri Dec 13, 2024   1016 ECG 12 lead  I have personally reviewed and interpreted this EKG.  Normal sinus rhythm, rate 90 BPM  Normal axis  IL interval prolonged with 1st degree AV block  QRS duration within normal limits.  QTc within normal limits.  No signs of acute ischemia or infarction   [SS]   1259 Troponin I, High Sensitivity [RR]   1311 PO challenge passed [RR]      ED Course User Index  [RR] Terri Liang MD  [SS] Steffen Simerlink, MD       Medical Decision Making:  Patient is a 64-year-old male with past medical history of hypertension, CKD stage II secondary to diabetes, CAD, chronic right knee pain, bursitis, nausea and headache presenting due to 2 days of nausea and vomiting with associated lightheadedness.  Differentials considered but not limited to ACS, pancreatitis, cholecystitis, cholelithiasis, gastritis, electrolyte abnormality, medication side effect.    Patient on patient's history and physical exam, basic lab work, troponin and lipase were obtained.  Patient has a VBG that does not show an elevated lactate and a normal pH of 7.44.  Patient has a slightly elevated hemoglobin to 18.7 that may reflect hemoconcentration however it has been as high as 19.4.  Patient was given a liter fluid and has otherwise unremarkable lab work.  Troponin is 15 and lipase is 21.  Patient was given multimodal antinausea medications and discharged in medically stable condition.  Patient had strict return precautions discussed.  Patient care was overseen by attending physician agrees with plan disposition.    External records reviewed: recent inpatient, clinic, and prior ED notes  Diagnostic imaging independently reviewed/interpreted by me (as reflected in MDM) includes: none  Social Determinants Affecting Care: None identified  Discussion of management with other providers: attending  Prescription Drug Consideration: reglan  Escalation of Care: none    Impression:    Nausea  Vomiting    Disposition: Discharge      Procedures ? Smart[employer] last updated 12/13/2024 9:38 AM        Terri Liang MD  Resident  12/13/24 2519

## 2024-12-13 NOTE — ED TRIAGE NOTES
Pt arriving from home via EMS, pt c/o copious/frequent N/V w/ lightheadedness and dizziness and mild frontal HA since yesterday AM, denies eating anything different/strange. Pt states he was put on Doxycycline for URI a week ago, had 2 pills left. Pt denies sick contacts but states travel 3 weeks ago [place]. Reports URI nagging productive cough that is better. Hx DM2, hernadez's, reflux

## 2024-12-13 NOTE — ED NOTES
Pt requesting headache med if possible. 4/10 severity. Provider notified.     Perry John RN  12/13/24 8491

## 2024-12-16 LAB
ATRIAL RATE: 90 BPM
P AXIS: 58 DEGREES
P OFFSET: 120 MS
P ONSET: 87 MS
PR INTERVAL: 242 MS
Q ONSET: 208 MS
QRS COUNT: 15 BEATS
QRS DURATION: 94 MS
QT INTERVAL: 368 MS
QTC CALCULATION(BAZETT): 450 MS
QTC FREDERICIA: 421 MS
R AXIS: -23 DEGREES
T AXIS: 64 DEGREES
T OFFSET: 392 MS
VENTRICULAR RATE: 90 BPM

## 2024-12-18 DIAGNOSIS — N18.2 CKD STAGE 2 DUE TO TYPE 2 DIABETES MELLITUS (MULTI): ICD-10-CM

## 2024-12-18 DIAGNOSIS — I25.10 CORONARY ARTERY DISEASE INVOLVING NATIVE CORONARY ARTERY OF NATIVE HEART WITHOUT ANGINA PECTORIS: ICD-10-CM

## 2024-12-18 DIAGNOSIS — E11.22 CKD STAGE 2 DUE TO TYPE 2 DIABETES MELLITUS (MULTI): ICD-10-CM

## 2024-12-18 DIAGNOSIS — E11.39 TYPE 2 DIABETES MELLITUS WITH OTHER OPHTHALMIC COMPLICATION, WITH LONG-TERM CURRENT USE OF INSULIN: Primary | ICD-10-CM

## 2024-12-18 DIAGNOSIS — Z79.4 TYPE 2 DIABETES MELLITUS WITH OTHER OPHTHALMIC COMPLICATION, WITH LONG-TERM CURRENT USE OF INSULIN: Primary | ICD-10-CM

## 2024-12-18 RX ORDER — TIRZEPATIDE 2.5 MG/.5ML
2.5 INJECTION, SOLUTION SUBCUTANEOUS WEEKLY
Qty: 2 ML | Refills: 1 | Status: SHIPPED | OUTPATIENT
Start: 2024-12-18

## 2024-12-18 NOTE — PROGRESS NOTES
Patient still with ongoing GI problems and presumptively connected to his Ozempic use  Willing to do a trial of Mounjaro as we discussed before  Also has had some discomfort centrally feels behind the sternum may be a little bit lower  Known history of histologic changes of Browning's esophagus  Does not report food getting stuck  Bowel habits doing better overall so does not sound the gastroparesis  He has not tried an acid neutralized or just to see if he gets some chest relief  No sweating, no heart racing skipping a beat or other symptoms noted that would be perceived as more of a cardiac phenomenon  Feels comfortable trying some Tums at work and will update us  Did have endoscopy 2 years ago which did show changes as noted above, recommendation was to do follow-up in 3 years and colonoscopy in 5 -so due for EGD this year with Dr. Gregg    Requested Prescriptions     Signed Prescriptions Disp Refills    tirzepatide (Mounjaro) 2.5 mg/0.5 mL pen injector 2 mL 1     Sig: Inject 2.5 mg under the skin 1 (one) time per week.

## 2024-12-31 DIAGNOSIS — J22 LRTI (LOWER RESPIRATORY TRACT INFECTION): Primary | ICD-10-CM

## 2024-12-31 DIAGNOSIS — E11.3293 MILD NONPROLIFERATIVE DIABETIC RETINOPATHY OF BOTH EYES ASSOCIATED WITH TYPE 2 DIABETES MELLITUS, MACULAR EDEMA PRESENCE UNSPECIFIED: ICD-10-CM

## 2024-12-31 RX ORDER — AZITHROMYCIN 500 MG/1
500 TABLET, FILM COATED ORAL DAILY
Qty: 3 TABLET | Refills: 0 | Status: SHIPPED | OUTPATIENT
Start: 2024-12-31 | End: 2025-01-03

## 2025-01-02 RX ORDER — INSULIN GLARGINE 300 [IU]/ML
35 INJECTION, SOLUTION SUBCUTANEOUS NIGHTLY
Qty: 12 ML | Refills: 3 | Status: SHIPPED | OUTPATIENT
Start: 2025-01-02

## 2025-01-14 ENCOUNTER — OFFICE VISIT (OUTPATIENT)
Dept: CARDIOLOGY | Facility: HOSPITAL | Age: 65
End: 2025-01-14
Payer: MEDICARE

## 2025-01-14 VITALS
WEIGHT: 212 LBS | DIASTOLIC BLOOD PRESSURE: 60 MMHG | RESPIRATION RATE: 18 BRPM | OXYGEN SATURATION: 95 % | SYSTOLIC BLOOD PRESSURE: 126 MMHG | HEART RATE: 71 BPM | BODY MASS INDEX: 28.1 KG/M2 | HEIGHT: 73 IN

## 2025-01-14 DIAGNOSIS — I10 ESSENTIAL HYPERTENSION: ICD-10-CM

## 2025-01-14 DIAGNOSIS — Z79.4 TYPE 2 DIABETES MELLITUS WITH OTHER CIRCULATORY COMPLICATION, WITH LONG-TERM CURRENT USE OF INSULIN: ICD-10-CM

## 2025-01-14 DIAGNOSIS — E11.59 TYPE 2 DIABETES MELLITUS WITH OTHER CIRCULATORY COMPLICATION, WITH LONG-TERM CURRENT USE OF INSULIN: ICD-10-CM

## 2025-01-14 DIAGNOSIS — Z86.79 HISTORY OF ATRIAL FIBRILLATION: ICD-10-CM

## 2025-01-14 DIAGNOSIS — I25.10 CORONARY ARTERY DISEASE INVOLVING NATIVE CORONARY ARTERY OF NATIVE HEART WITHOUT ANGINA PECTORIS: Primary | ICD-10-CM

## 2025-01-14 DIAGNOSIS — E78.00 PURE HYPERCHOLESTEROLEMIA: ICD-10-CM

## 2025-01-14 PROCEDURE — 3078F DIAST BP <80 MM HG: CPT | Performed by: INTERNAL MEDICINE

## 2025-01-14 PROCEDURE — 3008F BODY MASS INDEX DOCD: CPT | Performed by: INTERNAL MEDICINE

## 2025-01-14 PROCEDURE — 3074F SYST BP LT 130 MM HG: CPT | Performed by: INTERNAL MEDICINE

## 2025-01-14 PROCEDURE — 99214 OFFICE O/P EST MOD 30 MIN: CPT | Performed by: INTERNAL MEDICINE

## 2025-01-14 PROCEDURE — 93005 ELECTROCARDIOGRAM TRACING: CPT | Performed by: INTERNAL MEDICINE

## 2025-01-14 PROCEDURE — 4010F ACE/ARB THERAPY RXD/TAKEN: CPT | Performed by: INTERNAL MEDICINE

## 2025-01-14 PROCEDURE — G2211 COMPLEX E/M VISIT ADD ON: HCPCS | Performed by: INTERNAL MEDICINE

## 2025-01-14 PROCEDURE — 1159F MED LIST DOCD IN RCRD: CPT | Performed by: INTERNAL MEDICINE

## 2025-01-14 PROCEDURE — 1160F RVW MEDS BY RX/DR IN RCRD: CPT | Performed by: INTERNAL MEDICINE

## 2025-01-14 NOTE — PROGRESS NOTES
Primary Care Physician: Noé Ralph MD  Date of Visit: 01/14/2025  3:20 PM EST  Location of visit: [facility]     Chief Complaint:   Chief Complaint   Patient presents with    Follow-up        HPI / Summary:   Harpal Covarrubias is a 64 y.o. male presents for followup.  He has no cardiac complaints.  He walks for up to 1 hour every day without chest pain or shortness of breath.  The patient denies chest pain, shortness of breath, palpitations, lightheadedness, syncope, orthopnea, paroxysmal nocturnal dyspnea, lower extremity edema, or bleeding problems.    He was taken off Ozempic secondary to GI side effects.          Past Medical History:   Diagnosis Date    Cardiovascular disease     Myocardial infarction (Multi)     Type 2 diabetes mellitus         Past Surgical History:   Procedure Laterality Date    OTHER SURGICAL HISTORY  10/14/2022    Colonoscopy - [facility] - polyp f/u 2027    OTHER SURGICAL HISTORY  10/14/2022    Esophagogastroduodenoscopy - [facility]          Social History:   reports that he has never smoked. He has never been exposed to tobacco smoke. He has never used smokeless tobacco. He reports that he does not currently use alcohol. He reports that he does not currently use drugs.     Family History:  family history is not on file.      Allergies:  Allergies   Allergen Reactions    Nitroglycerin Other and Anaphylaxis     Hypotension, Passing out       Outpatient Medications:  Current Outpatient Medications   Medication Instructions    aspirin 81 mg EC tablet 1 tablet, Daily    Bacillus coagulans (Digestive Advantage Prob Gummy) 250 million cell tablet,chewable Chew.    blood sugar diagnostic (OneTouch Verio test strips) strip Use to test sugars twice a day    candesartan (ATACAND) 8 mg, oral, Daily    cholecalciferol (Vitamin D-3) 1,250 mcg (50,000 unit) capsule 1 capsule, Weekly    clopidogrel (PLAVIX) 75 mg, oral, Daily    FreeStyle Ashley 3 Sensor device     glipiZIDE (Glucotrol) 10 mg tablet  "Take by mouth.    insulin glargine (TOUJEO MAX U-300 SOLOSTAR) 36 Units, subcutaneous, Nightly    insulin lispro (HUMALOG)  Units    Jardiance 25 mg, oral, Daily    lactobacillus (Culturelle) 10 billion cell capsule 1 capsule, Daily    lansoprazole (Prevacid) 30 mg DR capsule TAKE 1 CAPSULE 1/2 HOUR BEFORE BREAKFAST AND DINNER    metFORMIN (Glucophage) 1,000 mg tablet Take by mouth.    metoclopramide (REGLAN) 10 mg, oral, Every 8 hours PRN    metoprolol succinate XL (TOPROL-XL) 12.5 mg, oral, Daily    Mounjaro 2.5 mg, subcutaneous, Weekly    Ozempic 1 mg, subcutaneous, Weekly    pen needle, diabetic 31 gauge x 5/16\" needle 4 times daily    rosuvastatin (CRESTOR) 40 mg, oral, Daily       Physical Exam:  Vitals:    01/14/25 1545   BP: 126/60   BP Location: Left arm   Patient Position: Sitting   BP Cuff Size: Adult   Pulse: 71   Resp: 18   SpO2: 95%   Weight: 96.2 kg (212 lb)   Height: 1.854 m (6' 1\")     Wt Readings from Last 5 Encounters:   01/14/25 96.2 kg (212 lb)   12/13/24 90.7 kg (200 lb)   11/01/24 90.7 kg (200 lb)   07/25/24 91 kg (200 lb 9.6 oz)   05/16/24 92.5 kg (204 lb)     Body mass index is 27.97 kg/m².   General: Well-developed well-nourished in no acute distress  HEENT: Normocephalic atraumatic  Neck: Supple, JVP is normal negative hepatojugular reflux 2+ carotid pulses without bruit  Pulmonary: Normal respiratory effort, clear to auscultation  Cardiovascular: No right ventricular heave, normal S1 and S2, no murmurs rubs or gallops  Abdomen: Soft nontender nondistended  Extremities: Warm without edema 2+ radial pulses bilaterally 2+ posterior tibial pulses bilaterally  Neurologic: Alert and oriented x3  Psychiatric: Normal mood and affect     Last Labs:  CMP:  Recent Labs     12/13/24  0957 07/25/24  0912 05/10/24  1149    141 140   K 4.5 4.5 5.5*    102 103   CO2 26 28 30   ANIONGAP 17 16 13   BUN 22 14 26*   CREATININE 1.27 1.25 1.30   EGFR 63 64 61   GLUCOSE 93 97 92     Recent " Labs     12/13/24  0957 07/25/24  0912 05/10/24  1149   ALBUMIN 4.4 4.0 4.3   ALKPHOS 98 76 79   ALT 29 23 27   AST 26 22 23   BILITOT 1.1 0.5 0.7     CBC:  Recent Labs     12/13/24  0957 07/25/24  0912 05/10/24  1149   WBC 8.8 7.6 8.6   HGB 18.7* 17.1 18.5*   HCT 56.9* 52.6* 55.7*    205 248   MCV 87 89 89     COAG:   Recent Labs     11/28/22  1230 11/28/22  1051 12/10/21  2032   INR 1.0 1.0  --    DDIMERVTE  --   --  369     HEME/ENDO:  Recent Labs     07/25/24  0912 05/10/24  1149 02/06/24  1041 10/30/23  1242 06/15/23  1128 04/17/23  1411   TSH  --  1.92  --  1.25  --  2.11   HGBA1C 7.4* 6.7* 6.3* 5.6   < > 6.0*    < > = values in this interval not displayed.      CARDIAC:   Recent Labs     12/13/24  0957 07/25/24  0912 07/18/23  1456 11/28/22  1140 11/28/22  1051   TROPHS 15 7 16   < > 5,250*   BNP  --   --   --   --  150*    < > = values in this interval not displayed.     Recent Labs     07/25/24  0912 05/10/24  1149 02/06/24  1041 10/30/23  1242 06/15/23  1128 04/17/23  1411 11/29/22  0542   CHOL 90 96 107   < > 82 103 96   LDLF  --   --   --   --  24 44 38   LDLCALC 34 39 42   < >  --   --   --    HDL 40.6 45.1 51.4   < > 36.0* 38.0* 36.6*   TRIG 77 61 70   < > 111 104 107    < > = values in this interval not displayed.       Last Cardiology Tests:  ECG:  Electrocardiogram performed today that I reviewed shows normal sinus rhythm with first-degree AV block left axis deviation.    Echo:  Echocardiogram May 12, 2023  CONCLUSIONS:  1. Left ventricular systolic function is low normal with a 50-55% estimated ejection fraction.  2. Mid inferoseptal segment is abnormal.  3. Spectral Doppler shows an impaired relaxation pattern of left ventricular diastolic filling.       Cath:  Cardiac catheterization November 28, 2022  Coronary Lesion Summary:  Vessel         Stenosis    Vessel Segment  LAD          40% stenosis  proximal to mid  LAD          40% stenosis      distal  1st Diagonal 60% stenosis  proximal to  mid  Circumflex   100% stenosis proximal to mid  Ramus        50% stenosis     proximal  RCA          40% stenosis     proximal  RCA          30% stenosis      distal   CONCLUSIONS:   1. Severe single vessel CAD in a codominant system.   2. Successful OCT guided PCI of the proximal-mid LCx with a 3.0x18mm Resolute Solomon MARIELLA postdilated up to 3.25mm.   3. Moderate nonobstructive disease of LAD and RCA.   4. Left Ventricular end-diastolic pressure = 15.   5. Normal LVEDP.   6. No aortic stenosis.    Stress Test:  Stress Results:  No results found for this or any previous visit from the past 365 days.         Cardiac Imaging:  CT abdomen and pelvis with contrast July 18, 2023  VESSELS:   Aortoiliac system is patent without aneurysm.  Major visceral   branches are patent. Mild atherosclerosis.   IVC and major branches are grossly patent.   Major portal venous branches are patent.       Assessment/Plan   Diagnoses and all orders for this visit:  Coronary artery disease involving native coronary artery of native heart without angina pectoris  History of atrial fibrillation  -     ECG 12 lead (Clinic Performed)  Pure hypercholesterolemia  Essential hypertension  Type 2 diabetes mellitus with other circulatory complication, with long-term current use of insulin    In summary Mr. Covarrubias is a pleasant 64-year-old white male with a past medical history significant for coronary artery disease with a CT calcium score of 1916 status post PCI to a codominant left circumflex in the setting of a non-ST elevation myocardial infarction with recovery of his LV function, hypertension, hyperlipidemia, type II non-insulin-requiring diabetes, chronic kidney disease, remote atrial fibrillation status post ablation not on anticoagulation, and obesity.  He is asymptomatic from a cardiac perspective.  His blood pressure lipids are at goal.  I encouraged him to continue his efforts in losing weight.  I should continue his current  cardiovascular medications.  I will see him back in follow-up in 6 months        Orders:  Orders Placed This Encounter   Procedures    ECG 12 lead (Clinic Performed)      Followup Appts:  Future Appointments   Date Time Provider Department Center   1/22/2025  8:20 AM Juan Whitfield MD [id number] Psychiatric   6/18/2025  3:40 PM Balta Henry MD [id number] Psychiatric   1/14/2026  4:00 PM Balta Henry MD [id number] Psychiatric           ____________________________________________________________  Balta Henry MD  [facility]  [facility]

## 2025-01-15 LAB
ATRIAL RATE: 71 BPM
P AXIS: 43 DEGREES
P OFFSET: 139 MS
P ONSET: 76 MS
PR INTERVAL: 296 MS
Q ONSET: 224 MS
QRS COUNT: 12 BEATS
QRS DURATION: 98 MS
QT INTERVAL: 368 MS
QTC CALCULATION(BAZETT): 399 MS
QTC FREDERICIA: 389 MS
R AXIS: -33 DEGREES
T AXIS: 25 DEGREES
T OFFSET: 408 MS
VENTRICULAR RATE: 71 BPM

## 2025-02-22 ENCOUNTER — TELEPHONE (OUTPATIENT)
Dept: PRIMARY CARE | Facility: CLINIC | Age: 65
End: 2025-02-22
Payer: MEDICARE

## 2025-02-22 DIAGNOSIS — Z79.4 TYPE 2 DIABETES MELLITUS WITH OTHER OPHTHALMIC COMPLICATION, WITH LONG-TERM CURRENT USE OF INSULIN: Primary | ICD-10-CM

## 2025-02-22 DIAGNOSIS — E11.39 TYPE 2 DIABETES MELLITUS WITH OTHER OPHTHALMIC COMPLICATION, WITH LONG-TERM CURRENT USE OF INSULIN: Primary | ICD-10-CM

## 2025-02-22 RX ORDER — TIRZEPATIDE 5 MG/.5ML
5 INJECTION, SOLUTION SUBCUTANEOUS WEEKLY
Qty: 2 ML | Refills: 1 | Status: SHIPPED | OUTPATIENT
Start: 2025-02-22 | End: 2025-03-24

## 2025-02-23 NOTE — TELEPHONE ENCOUNTER
Doing well at the 2.5 Mounjaro  Asked about going up to the 5 mg dosing  As he is tolerating this well would encourage him to do just that  Requested Prescriptions     Signed Prescriptions Disp Refills    tirzepatide (Mounjaro) 5 mg/0.5 mL pen injector 2 mL 1     Sig: Inject 5 mg under the skin 1 (one) time per week.     Authorizing Provider: NIKUNJ OTERO

## 2025-03-05 ENCOUNTER — TELEPHONE (OUTPATIENT)
Dept: PRIMARY CARE | Facility: CLINIC | Age: 65
End: 2025-03-05
Payer: MEDICARE

## 2025-03-22 ENCOUNTER — TELEPHONE (OUTPATIENT)
Dept: PRIMARY CARE | Facility: CLINIC | Age: 65
End: 2025-03-22
Payer: MEDICARE

## 2025-03-22 DIAGNOSIS — I25.10 CORONARY ARTERY DISEASE INVOLVING NATIVE HEART WITHOUT ANGINA PECTORIS, UNSPECIFIED VESSEL OR LESION TYPE: ICD-10-CM

## 2025-03-22 DIAGNOSIS — Z86.39 HISTORY OF HYPERLIPIDEMIA: Primary | ICD-10-CM

## 2025-03-22 DIAGNOSIS — R93.1 HIGH CORONARY ARTERY CALCIUM SCORE: ICD-10-CM

## 2025-03-22 RX ORDER — ROSUVASTATIN CALCIUM 40 MG/1
40 TABLET, COATED ORAL DAILY
Qty: 90 TABLET | Refills: 3 | Status: SHIPPED | OUTPATIENT
Start: 2025-03-22

## 2025-03-23 NOTE — TELEPHONE ENCOUNTER
Requested refill of Rx as noted - and to note he'd prefer to stay on current Monjauro dosing     Requested Prescriptions     Signed Prescriptions Disp Refills    rosuvastatin (Crestor) 40 mg tablet 90 tablet 3     Sig: Take 1 tablet (40 mg) by mouth once daily.     Authorizing Provider: NIKUNJ OTERO

## 2025-03-31 ENCOUNTER — TELEPHONE (OUTPATIENT)
Dept: PRIMARY CARE | Facility: CLINIC | Age: 65
End: 2025-03-31
Payer: MEDICARE

## 2025-03-31 NOTE — TELEPHONE ENCOUNTER
"Patient calling with complaints of \"left knee problems again.\"  He was scheduled to travel today and had to cancel his flight due to limitations with his knee.  He is having significant pain, specifically with sitting to standing.  Also notes difficulty standing and walking.  He denies any swelling or warmth.  Notes that he has experienced bursitis in the opposite knee and does not feel that this is the same.  Dr. Hunt advised and requested accommodation for appointment this week.  We were able to get the patient in on Thursday in [place].  Patient agreeable to this date/location.   He will go to the [facility] sooner should he feel the pain is intolerable with used of NSAIDS.   I will also add to the wait list should something sooner come up with Dr. Hunt.  Patient understood and agreeable to this plan.   "

## 2025-04-03 ENCOUNTER — HOSPITAL ENCOUNTER (OUTPATIENT)
Dept: RADIOLOGY | Facility: CLINIC | Age: 65
Discharge: HOME | End: 2025-04-03
Payer: MEDICARE

## 2025-04-03 ENCOUNTER — OFFICE VISIT (OUTPATIENT)
Dept: ORTHOPEDIC SURGERY | Facility: CLINIC | Age: 65
End: 2025-04-03
Payer: MEDICARE

## 2025-04-03 ENCOUNTER — HOSPITAL ENCOUNTER (OUTPATIENT)
Dept: RADIOLOGY | Facility: EXTERNAL LOCATION | Age: 65
Discharge: HOME | End: 2025-04-03

## 2025-04-03 DIAGNOSIS — M17.0 PRIMARY OSTEOARTHRITIS OF BOTH KNEES: Primary | ICD-10-CM

## 2025-04-03 DIAGNOSIS — M25.562 LEFT KNEE PAIN, UNSPECIFIED CHRONICITY: ICD-10-CM

## 2025-04-03 PROCEDURE — 20611 DRAIN/INJ JOINT/BURSA W/US: CPT | Mod: 50 | Performed by: FAMILY MEDICINE

## 2025-04-03 PROCEDURE — 2500000004 HC RX 250 GENERAL PHARMACY W/ HCPCS (ALT 636 FOR OP/ED): Performed by: FAMILY MEDICINE

## 2025-04-03 PROCEDURE — 99214 OFFICE O/P EST MOD 30 MIN: CPT | Performed by: FAMILY MEDICINE

## 2025-04-03 PROCEDURE — 73564 X-RAY EXAM KNEE 4 OR MORE: CPT | Mod: LEFT SIDE | Performed by: RADIOLOGY

## 2025-04-03 PROCEDURE — 99214 OFFICE O/P EST MOD 30 MIN: CPT | Mod: 25 | Performed by: FAMILY MEDICINE

## 2025-04-03 PROCEDURE — 4010F ACE/ARB THERAPY RXD/TAKEN: CPT | Performed by: FAMILY MEDICINE

## 2025-04-03 PROCEDURE — 73564 X-RAY EXAM KNEE 4 OR MORE: CPT | Mod: LT

## 2025-04-03 RX ORDER — ROPIVACAINE HYDROCHLORIDE 5 MG/ML
4 INJECTION, SOLUTION EPIDURAL; INFILTRATION; PERINEURAL
Status: COMPLETED | OUTPATIENT
Start: 2025-04-03 | End: 2025-04-03

## 2025-04-03 RX ORDER — METHYLPREDNISOLONE ACETATE 40 MG/ML
80 INJECTION, SUSPENSION INTRA-ARTICULAR; INTRALESIONAL; INTRAMUSCULAR; SOFT TISSUE
Status: COMPLETED | OUTPATIENT
Start: 2025-04-03 | End: 2025-04-03

## 2025-04-03 RX ORDER — LIDOCAINE HYDROCHLORIDE 10 MG/ML
4 INJECTION, SOLUTION INFILTRATION; PERINEURAL
Status: COMPLETED | OUTPATIENT
Start: 2025-04-03 | End: 2025-04-03

## 2025-04-03 RX ADMIN — METHYLPREDNISOLONE ACETATE 80 MG: 40 INJECTION, SUSPENSION INTRA-ARTICULAR; INTRALESIONAL; INTRAMUSCULAR; INTRASYNOVIAL; SOFT TISSUE at 15:50

## 2025-04-03 RX ADMIN — ROPIVACAINE HYDROCHLORIDE 4 ML: 5 INJECTION EPIDURAL; INFILTRATION; PERINEURAL at 15:50

## 2025-04-03 RX ADMIN — LIDOCAINE HYDROCHLORIDE 4 ML: 10 INJECTION, SOLUTION INFILTRATION; PERINEURAL at 15:50

## 2025-04-03 NOTE — LETTER
April 3, 2025     Noé Ralph MD  [address] [place] [facility]  [facility] [address]    Patient: Harpal Covarrubias   YOB: 1960   Date of Visit: 4/3/2025       Dear Dr. Noé Ralph MD:    Thank you for referring Harpal Covarrubias to me for evaluation. Below are my notes for this consultation.  If you have questions, please do not hesitate to call me. I look forward to following your patient along with you.       Sincerely,     Bishop Hunt MD      CC: No Recipients  ______________________________________________________________________________________    Sports Medicine Office Note    Today's Date:  04/03/2025  Patient ID: Harpal Covarrubias is a 65 y.o. male.    L Inj/Asp: bilateral knee on 4/3/2025 3:50 PM  Indications: pain  Details: 22 G needle, ultrasound-guided superolateral approach  Medications (Right): 4 mL lidocaine 10 mg/mL (1 %); 80 mg methylPREDNISolone acetate 40 mg/mL; 4 mL ropivacaine 5 mg/mL (0.5 %)  Medications (Left): 4 mL lidocaine 10 mg/mL (1 %); 80 mg methylPREDNISolone acetate 40 mg/mL; 4 mL ropivacaine 5 mg/mL (0.5 %)  Outcome: tolerated well, no immediate complications  Procedure, treatment alternatives, risks and benefits explained, specific risks discussed. Consent was given by the patient. Immediately prior to procedure a time out was called to verify the correct patient, procedure, equipment, support staff and site/side marked as required. Patient was prepped and draped in the usual sterile fashion.       HPI: Harpal Covarrubias is a 65 y.o. male  who presents today for right knee swelling and pain    On, 6/11/2024, his right knee swelling started approximately 8 to 9 months ago.  He does not remember any trauma prior to the onset of the swelling but did get 2 husky puppies around that time. He plays with them often and is typically kneeling on a carpeted floor.  He initially saw his PCP for the swelling who referred him to   [facility].  He has been seen Dr. Barboza in February of this year and April of this year and had a prepatellar bursal drainage performed at both visits resulting in 60 cc and 70 cc of fluid aspirated respectively.  Last time Dr. Barboza did talk to him about a bursectomy if the bursa was refractory.  He has been treating the swelling with icing and intermittent compression sleeve with minimal to no improvement.  We agreed to do a prepatellar bursa aspiration today.  Recommended a compression sleeve throughout the day.  Prednisone ordered today in the office.  Patient is unable to do anti-inflammatories secondary to CAD.  Instructed that if this does return he should follow-up with a new surgeon for discussion about bursectomy. All questions were answered and he is agreeable to plan.  Follow-up as needed.    Today, 4/3/2025, he presents for follow-up of his right knee and a new complaint of left knee pain.  He states that his right knee has not had any swelling but he is having pain particularly behind his kneecap.  He also has pain behind his left knee As well.  He states it is worse with squats, lunges, going from sitting to standing and, up-and-down stairs/inclines.  He denies any specific falls or injuries.  He is not taking any medications regularly but did recently  Aleve.  He is not sure if he should be taking it because he is also on Plavix after his previous heart attack.  He has been using his compression knee sleeve intermittently and is not sure if that seems to be helping or not.    He has no other complaints.    Physical Examination:   The Bilateral knee is without obvious signs of acute bony deformity, swelling, erythema, ecchymosis. trace effusion bilaterally. The patella is without tenderness. Apprehension is negative with medial and lateral glide. Patella crepitus is positive. Patella grind is positive. The medial joint line is nontender and without bony crepitus or step-off. The lateral joint  line is nontender and without bony crepitus or step-off. Flexion & extension are full and symmetrical. Varus & valgus stress test at 0° and 30° of flexion, Lachman's, and Shira's are all negative. Gait is pain-free and tandem.    Imaging:  Radiographs of the left knee were reviewed and revealed evidence of patellofemoral arthritis with mild medial joint line space decreased.  Previous x-rays of the right knee were also reviewed from 6/11/2024 showing patellofemoral arthritis with medial joint space decreased.  The studies were reviewed by me personally in the office today.      Procedure Note:  Procedure #1  After consent was obtained, right knee was prepped in a sterile fashion. Ultrasound guidance was used to help insure proper needle placement into the joint, decrease patient discomfort, and decrease collateral damage. The joint was visualized and Depo-Medrol 80 mg with lidocaine 4 mL & ropivacaine 4 mL were injected without any complications. Ultrasound images were saved on an internal file for later reference. The patient tolerated the procedure well and the area was cleaned and bandaged.     Procedure #2  After consent was obtained, left knee was prepped in a sterile fashion. Ultrasound guidance was used to help insure proper needle placement into the joint, decrease patient discomfort, and decrease collateral damage. The joint was visualized and Depo-Medrol 80 mg with lidocaine 4 mL & ropivacaine 4 mL were injected without any complications. Ultrasound images were saved on an internal file for later reference. The patient tolerated the procedure well and the area was cleaned and bandaged.     Procedure Note Attestation   Procedure performed by my sports medicine fellow.    I, Dr. Bishop Hunt MD, supervised the entire procedure .      1. Primary osteoarthritis of both knees  Point of Care Ultrasound    L Inj/Asp: bilateral knee    ropivacaine (Naropin) 5 mg/mL (0.5 %) injection 4 mL    ropivacaine  (Naropin) 5 mg/mL (0.5 %) injection 4 mL    lidocaine (Xylocaine) 10 mg/mL (1 %) injection 4 mL    lidocaine (Xylocaine) 10 mg/mL (1 %) injection 4 mL    methylPREDNISolone acetate (DEPO-Medrol) injection 80 mg    methylPREDNISolone acetate (DEPO-Medrol) injection 80 mg      2. Left knee pain, unspecified chronicity  XR knee left 4+ views          Assessment and Plan:     We reviewed the exam and x-ray findings and discussed the conservative and surgical treatment options. We agreed that with his patellofemoral arthritis and limitations on medications he can take outpatient for pain management, felt that corticosteroid injections bilaterally would be the most appropriate management for his pain.  Also discussed the option for viscosupplementation and PRP.  He was provided with information on this and will reach out in regards to prior authorization for the viscosupplementation and if he would like to pursue PRP injections.  Otherwise encouraged him to continue activity modifications.  Will have him follow-up as needed.  He is on PLAVIX.    Eric Salomon DO   Sports Medicine Fellow     **This note was dictated using Dragon speech recognition software and was not corrected for spelling or grammatical errors**.      Attestation signed by Bishop Hunt MD at 4/3/2025  6:29 PM:    Attending Note     Trainee role: Fellow    Trainee discussed patient with Dr. Hunt          I saw and evaluated the patient. I personally obtained the key and critical portions of the history and physical exam or was physically present for key and critical portions performed by the trainee. I reviewed the trainee's documentation and discussed the patient with the trainee. I agree with the trainee's medical decision making, as documented on the trainee's note.         Bishop Hunt MD  Sports Medicine Specialist  [facility] [place]

## 2025-04-03 NOTE — PROGRESS NOTES
Sports Medicine Office Note    Today's Date:  04/03/2025  Patient ID: Harpal Covarrubias is a 65 y.o. male.    L Inj/Asp: bilateral knee on 4/3/2025 3:50 PM  Indications: pain  Details: 22 G needle, ultrasound-guided superolateral approach  Medications (Right): 4 mL lidocaine 10 mg/mL (1 %); 80 mg methylPREDNISolone acetate 40 mg/mL; 4 mL ropivacaine 5 mg/mL (0.5 %)  Medications (Left): 4 mL lidocaine 10 mg/mL (1 %); 80 mg methylPREDNISolone acetate 40 mg/mL; 4 mL ropivacaine 5 mg/mL (0.5 %)  Outcome: tolerated well, no immediate complications  Procedure, treatment alternatives, risks and benefits explained, specific risks discussed. Consent was given by the patient. Immediately prior to procedure a time out was called to verify the correct patient, procedure, equipment, support staff and site/side marked as required. Patient was prepped and draped in the usual sterile fashion.       HPI: Harpal Covarrubias is a 65 y.o. male  who presents today for right knee swelling and pain    On, 6/11/2024, his right knee swelling started approximately 8 to 9 months ago.  He does not remember any trauma prior to the onset of the swelling but did get 2 husky puppies around that time. He plays with them often and is typically kneeling on a carpeted floor.  He initially saw his PCP for the swelling who referred him to Dr. Barboza.  He has been seen Dr. Barboza in February of this year and April of this year and had a prepatellar bursal drainage performed at both visits resulting in 60 cc and 70 cc of fluid aspirated respectively.  Last time Dr. Barboza did talk to him about a bursectomy if the bursa was refractory.  He has been treating the swelling with icing and intermittent compression sleeve with minimal to no improvement.  We agreed to do a prepatellar bursa aspiration today.  Recommended a compression sleeve throughout the day.  Prednisone ordered today in the office.  Patient is unable to do anti-inflammatories  secondary to CAD.  Instructed that if this does return he should follow-up with a new surgeon for discussion about bursectomy. All questions were answered and he is agreeable to plan.  Follow-up as needed.    Today, 4/3/2025, he presents for follow-up of his right knee and a new complaint of left knee pain.  He states that his right knee has not had any swelling but he is having pain particularly behind his kneecap.  He also has pain behind his left knee As well.  He states it is worse with squats, lunges, going from sitting to standing and, up-and-down stairs/inclines.  He denies any specific falls or injuries.  He is not taking any medications regularly but did recently  Aleve.  He is not sure if he should be taking it because he is also on Plavix after his previous heart attack.  He has been using his compression knee sleeve intermittently and is not sure if that seems to be helping or not.    He has no other complaints.    Physical Examination:   The Bilateral knee is without obvious signs of acute bony deformity, swelling, erythema, ecchymosis. trace effusion bilaterally. The patella is without tenderness. Apprehension is negative with medial and lateral glide. Patella crepitus is positive. Patella grind is positive. The medial joint line is nontender and without bony crepitus or step-off. The lateral joint line is nontender and without bony crepitus or step-off. Flexion & extension are full and symmetrical. Varus & valgus stress test at 0° and 30° of flexion, Lachman's, and Shira's are all negative. Gait is pain-free and tandem.    Imaging:  Radiographs of the left knee were reviewed and revealed evidence of patellofemoral arthritis with mild medial joint line space decreased.  Previous x-rays of the right knee were also reviewed from 6/11/2024 showing patellofemoral arthritis with medial joint space decreased.  The studies were reviewed by me personally in the office today.      Procedure  Note:  Procedure #1  After consent was obtained, right knee was prepped in a sterile fashion. Ultrasound guidance was used to help insure proper needle placement into the joint, decrease patient discomfort, and decrease collateral damage. The joint was visualized and Depo-Medrol 80 mg with lidocaine 4 mL & ropivacaine 4 mL were injected without any complications. Ultrasound images were saved on an internal file for later reference. The patient tolerated the procedure well and the area was cleaned and bandaged.     Procedure #2  After consent was obtained, left knee was prepped in a sterile fashion. Ultrasound guidance was used to help insure proper needle placement into the joint, decrease patient discomfort, and decrease collateral damage. The joint was visualized and Depo-Medrol 80 mg with lidocaine 4 mL & ropivacaine 4 mL were injected without any complications. Ultrasound images were saved on an internal file for later reference. The patient tolerated the procedure well and the area was cleaned and bandaged.     Procedure Note Attestation   Procedure performed by my sports medicine fellow.    I, Dr. Bishop Hunt MD, supervised the entire procedure .      1. Primary osteoarthritis of both knees  Point of Care Ultrasound    L Inj/Asp: bilateral knee    ropivacaine (Naropin) 5 mg/mL (0.5 %) injection 4 mL    ropivacaine (Naropin) 5 mg/mL (0.5 %) injection 4 mL    lidocaine (Xylocaine) 10 mg/mL (1 %) injection 4 mL    lidocaine (Xylocaine) 10 mg/mL (1 %) injection 4 mL    methylPREDNISolone acetate (DEPO-Medrol) injection 80 mg    methylPREDNISolone acetate (DEPO-Medrol) injection 80 mg      2. Left knee pain, unspecified chronicity  XR knee left 4+ views          Assessment and Plan:     We reviewed the exam and x-ray findings and discussed the conservative and surgical treatment options. We agreed that with his patellofemoral arthritis and limitations on medications he can take outpatient for pain management,  felt that corticosteroid injections bilaterally would be the most appropriate management for his pain.  Also discussed the option for viscosupplementation and PRP.  He was provided with information on this and will reach out in regards to prior authorization for the viscosupplementation and if he would like to pursue PRP injections.  Otherwise encouraged him to continue activity modifications.  Will have him follow-up as needed.  He is on PLAVIX.    Eric Salomon, DO  [facility] Sports Medicine Fellow     **This note was dictated using Dragon speech recognition software and was not corrected for spelling or grammatical errors**.

## 2025-04-05 ENCOUNTER — TELEPHONE (OUTPATIENT)
Dept: PRIMARY CARE | Facility: CLINIC | Age: 65
End: 2025-04-05
Payer: MEDICARE

## 2025-04-05 DIAGNOSIS — R10.33 UMBILICAL PAIN: ICD-10-CM

## 2025-04-05 DIAGNOSIS — R19.7 DIARRHEA, UNSPECIFIED TYPE: Primary | ICD-10-CM

## 2025-04-05 DIAGNOSIS — R14.0 BLOATING: ICD-10-CM

## 2025-04-05 NOTE — TELEPHONE ENCOUNTER
Recent knee pain issues - seen by Dr Hunt - got injections - will look into other therapies going forward - however, had GI problems this past Monday, explosive diarrhea and nausea - used Zofran which helped and no solid food for about thee days - introduced bland food in the past days, but not well-tolerated - not consistent resolution - has increased water intake and has used Gatorade - has hesitated to use Imodium - no fever / chills - no blood - enc use of Imodium as a trial to even things out - does not seem to have had a clear benefit from switching to Monjauro - will check for

## 2025-04-07 ENCOUNTER — OFFICE VISIT (OUTPATIENT)
Dept: PRIMARY CARE | Facility: CLINIC | Age: 65
End: 2025-04-07
Payer: MEDICARE

## 2025-04-07 VITALS
HEART RATE: 81 BPM | TEMPERATURE: 97.4 F | OXYGEN SATURATION: 96 % | DIASTOLIC BLOOD PRESSURE: 79 MMHG | SYSTOLIC BLOOD PRESSURE: 138 MMHG

## 2025-04-07 DIAGNOSIS — R10.9 ABDOMINAL PAIN, UNSPECIFIED ABDOMINAL LOCATION: Primary | ICD-10-CM

## 2025-04-07 DIAGNOSIS — K59.00 CONSTIPATION, UNSPECIFIED CONSTIPATION TYPE: ICD-10-CM

## 2025-04-07 PROCEDURE — 3078F DIAST BP <80 MM HG: CPT | Performed by: FAMILY MEDICINE

## 2025-04-07 PROCEDURE — 3075F SYST BP GE 130 - 139MM HG: CPT | Performed by: FAMILY MEDICINE

## 2025-04-07 PROCEDURE — 99215 OFFICE O/P EST HI 40 MIN: CPT | Performed by: FAMILY MEDICINE

## 2025-04-07 PROCEDURE — 4010F ACE/ARB THERAPY RXD/TAKEN: CPT | Performed by: FAMILY MEDICINE

## 2025-04-07 PROCEDURE — 1125F AMNT PAIN NOTED PAIN PRSNT: CPT | Performed by: FAMILY MEDICINE

## 2025-04-07 PROCEDURE — G2211 COMPLEX E/M VISIT ADD ON: HCPCS | Performed by: FAMILY MEDICINE

## 2025-04-07 ASSESSMENT — PAIN SCALES - GENERAL: PAINLEVEL_OUTOF10: 8

## 2025-04-07 NOTE — PROGRESS NOTES
Subjective   Patient ID: Harpal Covarrubias is a 65 y.o. male who presents for Abdominal Pain, Diarrhea, and Nausea.  HPI  Spoke with him on Friday re. Diarrhea - took Imodium as suggested and diarrhea stopped by Saturday - was able to reintroduce some food Sat - no recent bowel activity since last Friday - seems centered around belly-button - also belly-button seems less soft than what he recalls it being - not as crampy in abdomen - ? Bloating - was having burping/belching but that quieted since and was quite malodorous -no fever or chills, no blood in the stool reported previously -has noted that his stools as of late tend to be small hard balls -was suggested in the past to maybe try a fiber product, but he has yet to try -seems to hydrate adequately -some of his most recent issues really may have been triggered by steroid injections into both knees as his glucose levels certainly shot up significantly even peaking over 300 at 1 point in time - not having much in way of urinary frequency -discussed possibility that his use of a GLP-1 may be impacting his bowel function significantly along with creating some nausea issues which she has battled over time -no cardiac symptoms reported    Review of Systems  Essentially noncontributory otherwise    Objective   /79 (BP Location: Left arm, Patient Position: Sitting, BP Cuff Size: Large adult)   Pulse 81   Temp 36.3 °C (97.4 °F) (Temporal)   SpO2 96%     Physical Exam  General: No acute distress but appears a bit fatigued/washed out  HEENT: No conjunctival injection or scleral icterus, seems to have moist mucous membranes  Neck: No gross JVD or thyromegaly  Abdomen: Soft, nontender and seems slt distended with hyperactive bowel sounds heard throughout, umbilicus seems slightly distended but overall reducible with modest tenderness but there is some blanching erythema and no evidence of localized skin infection  Assessment/Plan   Problem List Items Addressed This  "Visit    None  Visit Diagnoses       Abdominal pain, unspecified abdominal location    -  Primary    Relevant Orders    CBC and Auto Differential    Comprehensive metabolic panel    Celiac Panel    C-reactive protein    Sedimentation Rate    Constipation, unspecified constipation type        Relevant Orders    Celiac Panel        1) will check some \"GI\" labs as ordered here and previously (stool testing) - may need eval by Dr Gregg     2) follow umbilical tenderness - protrusion, mild erythema and slight firmness secondary to some bowel distention/gas     3) hold Monjauro until sig improvement     4) recommend use of 1/2 dose of Citrucel to help better manage/resolve hard stools     TVT of 42 minutes with greater than 50% spent FTF in assessment/discussion and care coordination         "

## 2025-04-08 ENCOUNTER — APPOINTMENT (OUTPATIENT)
Dept: RADIOLOGY | Facility: HOSPITAL | Age: 65
End: 2025-04-08
Payer: MEDICARE

## 2025-04-08 ENCOUNTER — HOSPITAL ENCOUNTER (INPATIENT)
Facility: HOSPITAL | Age: 65
LOS: 1 days | Discharge: HOME | End: 2025-04-09
Attending: EMERGENCY MEDICINE | Admitting: HOSPITALIST
Payer: MEDICARE

## 2025-04-08 ENCOUNTER — HOSPITAL ENCOUNTER (OUTPATIENT)
Dept: RADIOLOGY | Facility: CLINIC | Age: 65
Discharge: HOME | End: 2025-04-08
Payer: MEDICARE

## 2025-04-08 DIAGNOSIS — K42.0 INCARCERATED UMBILICAL HERNIA: Primary | ICD-10-CM

## 2025-04-08 DIAGNOSIS — Z01.810 PRE-OPERATIVE CARDIOVASCULAR EXAMINATION, HIGH RISK SURGERY: ICD-10-CM

## 2025-04-08 DIAGNOSIS — R10.33 UMBILICAL PAIN: ICD-10-CM

## 2025-04-08 DIAGNOSIS — R14.0 BLOATING: ICD-10-CM

## 2025-04-08 DIAGNOSIS — K56.609 SMALL BOWEL OBSTRUCTION (MULTI): ICD-10-CM

## 2025-04-08 LAB
ALBUMIN SERPL BCP-MCNC: 4.1 G/DL (ref 3.4–5)
ALP SERPL-CCNC: 94 U/L (ref 33–136)
ALT SERPL W P-5'-P-CCNC: 19 U/L (ref 10–52)
ANION GAP SERPL CALC-SCNC: 15 MMOL/L (ref 10–20)
APTT PPP: 37 SECONDS (ref 26–36)
AST SERPL W P-5'-P-CCNC: 18 U/L (ref 9–39)
BASOPHILS # BLD AUTO: 0.06 X10*3/UL (ref 0–0.1)
BASOPHILS NFR BLD AUTO: 0.6 %
BILIRUB SERPL-MCNC: 1 MG/DL (ref 0–1.2)
BUN SERPL-MCNC: 16 MG/DL (ref 6–23)
CALCIUM SERPL-MCNC: 9.7 MG/DL (ref 8.6–10.3)
CARDIAC TROPONIN I PNL SERPL HS: 10 NG/L (ref 0–20)
CARDIAC TROPONIN I PNL SERPL HS: 10 NG/L (ref 0–20)
CHLORIDE SERPL-SCNC: 106 MMOL/L (ref 98–107)
CO2 SERPL-SCNC: 23 MMOL/L (ref 21–32)
CREAT SERPL-MCNC: 1.05 MG/DL (ref 0.5–1.3)
EGFRCR SERPLBLD CKD-EPI 2021: 78 ML/MIN/1.73M*2
EOSINOPHIL # BLD AUTO: 0.32 X10*3/UL (ref 0–0.7)
EOSINOPHIL NFR BLD AUTO: 3 %
ERYTHROCYTE [DISTWIDTH] IN BLOOD BY AUTOMATED COUNT: 13.3 % (ref 11.5–14.5)
FLUAV RNA RESP QL NAA+PROBE: NOT DETECTED
FLUBV RNA RESP QL NAA+PROBE: NOT DETECTED
GLUCOSE SERPL-MCNC: 124 MG/DL (ref 74–99)
HCT VFR BLD AUTO: 55.3 % (ref 41–52)
HGB BLD-MCNC: 18.6 G/DL (ref 13.5–17.5)
IMM GRANULOCYTES # BLD AUTO: 0.02 X10*3/UL (ref 0–0.7)
IMM GRANULOCYTES NFR BLD AUTO: 0.2 % (ref 0–0.9)
INR PPP: 1.1 (ref 0.9–1.1)
LACTATE SERPL-SCNC: 0.9 MMOL/L (ref 0.4–2)
LIPASE SERPL-CCNC: 25 U/L (ref 9–82)
LYMPHOCYTES # BLD AUTO: 1.83 X10*3/UL (ref 1.2–4.8)
LYMPHOCYTES NFR BLD AUTO: 17.3 %
MAGNESIUM SERPL-MCNC: 1.85 MG/DL (ref 1.6–2.4)
MCH RBC QN AUTO: 29.2 PG (ref 26–34)
MCHC RBC AUTO-ENTMCNC: 33.6 G/DL (ref 32–36)
MCV RBC AUTO: 87 FL (ref 80–100)
MONOCYTES # BLD AUTO: 0.93 X10*3/UL (ref 0.1–1)
MONOCYTES NFR BLD AUTO: 8.8 %
NEUTROPHILS # BLD AUTO: 7.41 X10*3/UL (ref 1.2–7.7)
NEUTROPHILS NFR BLD AUTO: 70.1 %
NRBC BLD-RTO: 0 /100 WBCS (ref 0–0)
PLATELET # BLD AUTO: 226 X10*3/UL (ref 150–450)
POTASSIUM SERPL-SCNC: 3.8 MMOL/L (ref 3.5–5.3)
PROT SERPL-MCNC: 6.7 G/DL (ref 6.4–8.2)
PROTHROMBIN TIME: 11.7 SECONDS (ref 9.8–12.4)
RBC # BLD AUTO: 6.38 X10*6/UL (ref 4.5–5.9)
SARS-COV-2 RNA RESP QL NAA+PROBE: NOT DETECTED
SODIUM SERPL-SCNC: 140 MMOL/L (ref 136–145)
WBC # BLD AUTO: 10.6 X10*3/UL (ref 4.4–11.3)

## 2025-04-08 PROCEDURE — 74177 CT ABD & PELVIS W/CONTRAST: CPT | Mod: FOREIGN READ | Performed by: RADIOLOGY

## 2025-04-08 PROCEDURE — 84484 ASSAY OF TROPONIN QUANT: CPT | Performed by: EMERGENCY MEDICINE

## 2025-04-08 PROCEDURE — 96375 TX/PRO/DX INJ NEW DRUG ADDON: CPT

## 2025-04-08 PROCEDURE — 85025 COMPLETE CBC W/AUTO DIFF WBC: CPT | Performed by: EMERGENCY MEDICINE

## 2025-04-08 PROCEDURE — 83735 ASSAY OF MAGNESIUM: CPT | Performed by: EMERGENCY MEDICINE

## 2025-04-08 PROCEDURE — 99285 EMERGENCY DEPT VISIT HI MDM: CPT | Mod: 25 | Performed by: EMERGENCY MEDICINE

## 2025-04-08 PROCEDURE — 85730 THROMBOPLASTIN TIME PARTIAL: CPT | Performed by: EMERGENCY MEDICINE

## 2025-04-08 PROCEDURE — 99233 SBSQ HOSP IP/OBS HIGH 50: CPT | Performed by: NURSE PRACTITIONER

## 2025-04-08 PROCEDURE — 2500000004 HC RX 250 GENERAL PHARMACY W/ HCPCS (ALT 636 FOR OP/ED): Performed by: EMERGENCY MEDICINE

## 2025-04-08 PROCEDURE — 74177 CT ABD & PELVIS W/CONTRAST: CPT

## 2025-04-08 PROCEDURE — 85610 PROTHROMBIN TIME: CPT | Performed by: EMERGENCY MEDICINE

## 2025-04-08 PROCEDURE — 83036 HEMOGLOBIN GLYCOSYLATED A1C: CPT | Mod: AHULAB | Performed by: HOSPITALIST

## 2025-04-08 PROCEDURE — 83690 ASSAY OF LIPASE: CPT | Performed by: EMERGENCY MEDICINE

## 2025-04-08 PROCEDURE — 36415 COLL VENOUS BLD VENIPUNCTURE: CPT | Performed by: EMERGENCY MEDICINE

## 2025-04-08 PROCEDURE — 96374 THER/PROPH/DIAG INJ IV PUSH: CPT

## 2025-04-08 PROCEDURE — 2550000001 HC RX 255 CONTRASTS: Performed by: EMERGENCY MEDICINE

## 2025-04-08 PROCEDURE — 87636 SARSCOV2 & INF A&B AMP PRB: CPT | Performed by: EMERGENCY MEDICINE

## 2025-04-08 PROCEDURE — 76705 ECHO EXAM OF ABDOMEN: CPT

## 2025-04-08 PROCEDURE — 80053 COMPREHEN METABOLIC PANEL: CPT | Performed by: EMERGENCY MEDICINE

## 2025-04-08 PROCEDURE — 96361 HYDRATE IV INFUSION ADD-ON: CPT

## 2025-04-08 PROCEDURE — 83605 ASSAY OF LACTIC ACID: CPT | Performed by: EMERGENCY MEDICINE

## 2025-04-08 PROCEDURE — 76705 ECHO EXAM OF ABDOMEN: CPT | Performed by: RADIOLOGY

## 2025-04-08 RX ORDER — MORPHINE SULFATE 4 MG/ML
4 INJECTION, SOLUTION INTRAMUSCULAR; INTRAVENOUS ONCE
Status: COMPLETED | OUTPATIENT
Start: 2025-04-08 | End: 2025-04-08

## 2025-04-08 RX ORDER — ONDANSETRON HYDROCHLORIDE 2 MG/ML
4 INJECTION, SOLUTION INTRAVENOUS ONCE
Status: COMPLETED | OUTPATIENT
Start: 2025-04-08 | End: 2025-04-08

## 2025-04-08 RX ADMIN — IOHEXOL 75 ML: 350 INJECTION, SOLUTION INTRAVENOUS at 22:34

## 2025-04-08 RX ADMIN — ONDANSETRON 4 MG: 2 INJECTION, SOLUTION INTRAMUSCULAR; INTRAVENOUS at 22:18

## 2025-04-08 RX ADMIN — MORPHINE SULFATE 4 MG: 4 INJECTION, SOLUTION INTRAMUSCULAR; INTRAVENOUS at 22:18

## 2025-04-08 RX ADMIN — SODIUM CHLORIDE 1000 ML: 0.9 INJECTION, SOLUTION INTRAVENOUS at 22:18

## 2025-04-08 ASSESSMENT — PAIN DESCRIPTION - PAIN TYPE
TYPE: ACUTE PAIN
TYPE: ACUTE PAIN

## 2025-04-08 ASSESSMENT — PAIN SCALES - GENERAL
PAINLEVEL_OUTOF10: 5 - MODERATE PAIN
PAINLEVEL_OUTOF10: 6
PAINLEVEL_OUTOF10: 7
PAINLEVEL_OUTOF10: 0 - NO PAIN

## 2025-04-08 ASSESSMENT — PAIN DESCRIPTION - LOCATION
LOCATION: ABDOMEN

## 2025-04-08 ASSESSMENT — PAIN - FUNCTIONAL ASSESSMENT: PAIN_FUNCTIONAL_ASSESSMENT: 0-10

## 2025-04-08 NOTE — TELEPHONE ENCOUNTER
Had seen yesterday - prelim lab tests do not look overly concerning - very much bothered by his umbilical pain / fullness / tenderness - will seek stst US

## 2025-04-09 ENCOUNTER — APPOINTMENT (OUTPATIENT)
Dept: CARDIOLOGY | Facility: HOSPITAL | Age: 65
End: 2025-04-09
Payer: MEDICARE

## 2025-04-09 ENCOUNTER — ANESTHESIA EVENT (OUTPATIENT)
Dept: OPERATING ROOM | Facility: HOSPITAL | Age: 65
End: 2025-04-09
Payer: MEDICARE

## 2025-04-09 ENCOUNTER — ANESTHESIA (OUTPATIENT)
Dept: OPERATING ROOM | Facility: HOSPITAL | Age: 65
End: 2025-04-09
Payer: MEDICARE

## 2025-04-09 ENCOUNTER — APPOINTMENT (OUTPATIENT)
Dept: RADIOLOGY | Facility: HOSPITAL | Age: 65
End: 2025-04-09
Payer: MEDICARE

## 2025-04-09 VITALS
TEMPERATURE: 98.7 F | BODY MASS INDEX: 26.51 KG/M2 | OXYGEN SATURATION: 96 % | WEIGHT: 200 LBS | SYSTOLIC BLOOD PRESSURE: 132 MMHG | RESPIRATION RATE: 18 BRPM | DIASTOLIC BLOOD PRESSURE: 61 MMHG | HEIGHT: 73 IN | HEART RATE: 78 BPM

## 2025-04-09 DIAGNOSIS — K59.00 CONSTIPATION, UNSPECIFIED CONSTIPATION TYPE: ICD-10-CM

## 2025-04-09 DIAGNOSIS — R14.0 BLOATING: ICD-10-CM

## 2025-04-09 DIAGNOSIS — R10.33 UMBILICAL PAIN: ICD-10-CM

## 2025-04-09 DIAGNOSIS — R10.9 ABDOMINAL PAIN, UNSPECIFIED ABDOMINAL LOCATION: ICD-10-CM

## 2025-04-09 DIAGNOSIS — K42.0 INCARCERATED UMBILICAL HERNIA: ICD-10-CM

## 2025-04-09 DIAGNOSIS — R19.7 DIARRHEA, UNSPECIFIED TYPE: ICD-10-CM

## 2025-04-09 LAB
ABO GROUP (TYPE) IN BLOOD: NORMAL
ALBUMIN SERPL-MCNC: 4.5 G/DL (ref 3.6–5.1)
ALP SERPL-CCNC: 94 U/L (ref 35–144)
ALT SERPL-CCNC: 20 U/L (ref 9–46)
ANION GAP SERPL CALC-SCNC: 9 MMOL/L (ref 10–20)
ANION GAP SERPL CALCULATED.4IONS-SCNC: 12 MMOL/L (CALC) (ref 7–17)
ANTIBODY SCREEN: NORMAL
AORTIC VALVE MEAN GRADIENT: 5 MMHG
AORTIC VALVE PEAK VELOCITY: 1.47 M/S
APPEARANCE UR: CLEAR
AST SERPL-CCNC: 18 U/L (ref 10–35)
ATRIAL RATE: 68 BPM
AV PEAK GRADIENT: 9 MMHG
AVA (PEAK VEL): 2.49 CM2
AVA (VTI): 2.69 CM2
BASOPHILS # BLD AUTO: 0.08 X10*3/UL (ref 0–0.1)
BASOPHILS # BLD AUTO: 57 CELLS/UL (ref 0–200)
BASOPHILS NFR BLD AUTO: 0.6 %
BASOPHILS NFR BLD AUTO: 0.9 %
BILIRUB SERPL-MCNC: 0.9 MG/DL (ref 0.2–1.2)
BILIRUB UR STRIP.AUTO-MCNC: NEGATIVE MG/DL
BUN SERPL-MCNC: 15 MG/DL (ref 6–23)
BUN SERPL-MCNC: 22 MG/DL (ref 7–25)
CALCIUM SERPL-MCNC: 9 MG/DL (ref 8.6–10.3)
CALCIUM SERPL-MCNC: 9.5 MG/DL (ref 8.6–10.3)
CHLORIDE SERPL-SCNC: 102 MMOL/L (ref 98–110)
CHLORIDE SERPL-SCNC: 106 MMOL/L (ref 98–107)
CO2 SERPL-SCNC: 25 MMOL/L (ref 20–32)
CO2 SERPL-SCNC: 28 MMOL/L (ref 21–32)
COLOR UR: ABNORMAL
CREAT SERPL-MCNC: 0.99 MG/DL (ref 0.5–1.3)
CREAT SERPL-MCNC: 1.14 MG/DL (ref 0.7–1.35)
CRP SERPL-MCNC: 6.5 MG/L
EGFRCR SERPLBLD CKD-EPI 2021: 71 ML/MIN/1.73M2
EGFRCR SERPLBLD CKD-EPI 2021: 84 ML/MIN/1.73M*2
EJECTION FRACTION APICAL 4 CHAMBER: 58.9
EJECTION FRACTION: 66 %
EOSINOPHIL # BLD AUTO: 0.47 X10*3/UL (ref 0–0.7)
EOSINOPHIL # BLD AUTO: 475 CELLS/UL (ref 15–500)
EOSINOPHIL NFR BLD AUTO: 5 %
EOSINOPHIL NFR BLD AUTO: 5.3 %
ERYTHROCYTE [DISTWIDTH] IN BLOOD BY AUTOMATED COUNT: 13 % (ref 11–15)
ERYTHROCYTE [DISTWIDTH] IN BLOOD BY AUTOMATED COUNT: 13.2 % (ref 11.5–14.5)
ERYTHROCYTE [SEDIMENTATION RATE] IN BLOOD BY WESTERGREN METHOD: 2 MM/H
EST. AVERAGE GLUCOSE BLD GHB EST-MCNC: 146 MG/DL
GLIADIN IGA SER IA-ACNC: <1 U/ML
GLIADIN IGG SER IA-ACNC: <1 U/ML
GLUCOSE BLD MANUAL STRIP-MCNC: 191 MG/DL (ref 74–99)
GLUCOSE BLD MANUAL STRIP-MCNC: 211 MG/DL (ref 74–99)
GLUCOSE BLD MANUAL STRIP-MCNC: 85 MG/DL (ref 74–99)
GLUCOSE BLD MANUAL STRIP-MCNC: 99 MG/DL (ref 74–99)
GLUCOSE SERPL-MCNC: 101 MG/DL (ref 74–99)
GLUCOSE SERPL-MCNC: 104 MG/DL (ref 65–139)
GLUCOSE UR STRIP.AUTO-MCNC: ABNORMAL MG/DL
HBA1C MFR BLD: 6.7 %
HCT VFR BLD AUTO: 48.8 % (ref 41–52)
HCT VFR BLD AUTO: 54.9 % (ref 38.5–50)
HGB BLD-MCNC: 16.9 G/DL (ref 13.5–17.5)
HGB BLD-MCNC: 18.4 G/DL (ref 13.2–17.1)
IGA SERPL-MCNC: 177 MG/DL (ref 70–320)
IMM GRANULOCYTES # BLD AUTO: 0.04 X10*3/UL (ref 0–0.7)
IMM GRANULOCYTES NFR BLD AUTO: 0.5 % (ref 0–0.9)
KETONES UR STRIP.AUTO-MCNC: ABNORMAL MG/DL
LEFT ATRIUM VOLUME AREA LENGTH INDEX BSA: 36.9 ML/M2
LEFT VENTRICLE INTERNAL DIMENSION DIASTOLE: 4.67 CM (ref 3.5–6)
LEFT VENTRICULAR OUTFLOW TRACT DIAMETER: 2.03 CM
LEUKOCYTE ESTERASE UR QL STRIP.AUTO: NEGATIVE
LYMPHOCYTES # BLD AUTO: 1.84 X10*3/UL (ref 1.2–4.8)
LYMPHOCYTES # BLD AUTO: 2176 CELLS/UL (ref 850–3900)
LYMPHOCYTES NFR BLD AUTO: 20.9 %
LYMPHOCYTES NFR BLD AUTO: 22.9 %
MAGNESIUM SERPL-MCNC: 1.7 MG/DL (ref 1.6–2.4)
MCH RBC QN AUTO: 29.7 PG (ref 27–33)
MCH RBC QN AUTO: 29.9 PG (ref 26–34)
MCHC RBC AUTO-ENTMCNC: 33.5 G/DL (ref 32–36)
MCHC RBC AUTO-ENTMCNC: 34.6 G/DL (ref 32–36)
MCV RBC AUTO: 86 FL (ref 80–100)
MCV RBC AUTO: 88.5 FL (ref 80–100)
MITRAL VALVE E/A RATIO: 0.93
MONOCYTES # BLD AUTO: 0.97 X10*3/UL (ref 0.1–1)
MONOCYTES # BLD AUTO: 808 CELLS/UL (ref 200–950)
MONOCYTES NFR BLD AUTO: 11 %
MONOCYTES NFR BLD AUTO: 8.5 %
MUCOUS THREADS #/AREA URNS AUTO: NORMAL /LPF
NEUTROPHILS # BLD AUTO: 5.41 X10*3/UL (ref 1.2–7.7)
NEUTROPHILS # BLD AUTO: 5985 CELLS/UL (ref 1500–7800)
NEUTROPHILS NFR BLD AUTO: 61.4 %
NEUTROPHILS NFR BLD AUTO: 63 %
NITRITE UR QL STRIP.AUTO: NEGATIVE
NRBC BLD-RTO: 0 /100 WBCS (ref 0–0)
P AXIS: 32 DEGREES
P OFFSET: 123 MS
P ONSET: 77 MS
PH UR STRIP.AUTO: 5 [PH]
PLATELET # BLD AUTO: 202 X10*3/UL (ref 150–450)
PLATELET # BLD AUTO: 233 THOUSAND/UL (ref 140–400)
PMV BLD REES-ECKER: 9.6 FL (ref 7.5–12.5)
POTASSIUM SERPL-SCNC: 4 MMOL/L (ref 3.5–5.3)
POTASSIUM SERPL-SCNC: 5.1 MMOL/L (ref 3.5–5.3)
PR INTERVAL: 292 MS
PROT SERPL-MCNC: 6.9 G/DL (ref 6.1–8.1)
PROT UR STRIP.AUTO-MCNC: ABNORMAL MG/DL
Q ONSET: 223 MS
QRS COUNT: 11 BEATS
QRS DURATION: 102 MS
QT INTERVAL: 388 MS
QTC CALCULATION(BAZETT): 412 MS
QTC FREDERICIA: 404 MS
R AXIS: -36 DEGREES
RBC # BLD AUTO: 5.66 X10*6/UL (ref 4.5–5.9)
RBC # BLD AUTO: 6.2 MILLION/UL (ref 4.2–5.8)
RBC # UR STRIP.AUTO: NEGATIVE MG/DL
RBC #/AREA URNS AUTO: NORMAL /HPF
RH FACTOR (ANTIGEN D): NORMAL
RIGHT VENTRICLE FREE WALL PEAK S': 12 CM/S
RIGHT VENTRICLE PEAK SYSTOLIC PRESSURE: 7.8 MMHG
SODIUM SERPL-SCNC: 139 MMOL/L (ref 135–146)
SODIUM SERPL-SCNC: 139 MMOL/L (ref 136–145)
SP GR UR STRIP.AUTO: 1.01
T AXIS: 12 DEGREES
T OFFSET: 417 MS
TRICUSPID ANNULAR PLANE SYSTOLIC EXCURSION: 1.9 CM
TTG IGA SER-ACNC: <1 U/ML
TTG IGG SER-ACNC: <1 U/ML
UROBILINOGEN UR STRIP.AUTO-MCNC: NORMAL MG/DL
VENTRICULAR RATE: 68 BPM
WBC # BLD AUTO: 8.8 X10*3/UL (ref 4.4–11.3)
WBC # BLD AUTO: 9.5 THOUSAND/UL (ref 3.8–10.8)
WBC #/AREA URNS AUTO: NORMAL /HPF

## 2025-04-09 PROCEDURE — 86901 BLOOD TYPING SEROLOGIC RH(D): CPT | Performed by: HOSPITALIST

## 2025-04-09 PROCEDURE — 36415 COLL VENOUS BLD VENIPUNCTURE: CPT | Performed by: HOSPITALIST

## 2025-04-09 PROCEDURE — 93005 ELECTROCARDIOGRAM TRACING: CPT

## 2025-04-09 PROCEDURE — 2500000002 HC RX 250 W HCPCS SELF ADMINISTERED DRUGS (ALT 637 FOR MEDICARE OP, ALT 636 FOR OP/ED): Performed by: HOSPITALIST

## 2025-04-09 PROCEDURE — 85025 COMPLETE CBC W/AUTO DIFF WBC: CPT | Performed by: HOSPITALIST

## 2025-04-09 PROCEDURE — 93306 TTE W/DOPPLER COMPLETE: CPT

## 2025-04-09 PROCEDURE — 82947 ASSAY GLUCOSE BLOOD QUANT: CPT

## 2025-04-09 PROCEDURE — 2500000004 HC RX 250 GENERAL PHARMACY W/ HCPCS (ALT 636 FOR OP/ED): Performed by: HOSPITALIST

## 2025-04-09 PROCEDURE — 1100000001 HC PRIVATE ROOM DAILY

## 2025-04-09 PROCEDURE — 99221 1ST HOSP IP/OBS SF/LOW 40: CPT | Performed by: SURGERY

## 2025-04-09 PROCEDURE — 93010 ELECTROCARDIOGRAM REPORT: CPT | Performed by: INTERNAL MEDICINE

## 2025-04-09 PROCEDURE — 99223 1ST HOSP IP/OBS HIGH 75: CPT | Performed by: INTERNAL MEDICINE

## 2025-04-09 PROCEDURE — 71045 X-RAY EXAM CHEST 1 VIEW: CPT | Performed by: STUDENT IN AN ORGANIZED HEALTH CARE EDUCATION/TRAINING PROGRAM

## 2025-04-09 PROCEDURE — 93306 TTE W/DOPPLER COMPLETE: CPT | Performed by: INTERNAL MEDICINE

## 2025-04-09 PROCEDURE — 71045 X-RAY EXAM CHEST 1 VIEW: CPT

## 2025-04-09 PROCEDURE — 80048 BASIC METABOLIC PNL TOTAL CA: CPT | Performed by: HOSPITALIST

## 2025-04-09 PROCEDURE — 83735 ASSAY OF MAGNESIUM: CPT | Performed by: HOSPITALIST

## 2025-04-09 PROCEDURE — 2500000001 HC RX 250 WO HCPCS SELF ADMINISTERED DRUGS (ALT 637 FOR MEDICARE OP): Performed by: HOSPITALIST

## 2025-04-09 PROCEDURE — 99223 1ST HOSP IP/OBS HIGH 75: CPT | Performed by: HOSPITALIST

## 2025-04-09 PROCEDURE — 81001 URINALYSIS AUTO W/SCOPE: CPT | Performed by: EMERGENCY MEDICINE

## 2025-04-09 PROCEDURE — 2500000004 HC RX 250 GENERAL PHARMACY W/ HCPCS (ALT 636 FOR OP/ED): Performed by: ANESTHESIOLOGIST ASSISTANT

## 2025-04-09 RX ORDER — LABETALOL HYDROCHLORIDE 5 MG/ML
5 INJECTION, SOLUTION INTRAVENOUS ONCE AS NEEDED
OUTPATIENT
Start: 2025-04-09

## 2025-04-09 RX ORDER — OXYCODONE HYDROCHLORIDE 5 MG/1
5 TABLET ORAL EVERY 4 HOURS PRN
OUTPATIENT
Start: 2025-04-09

## 2025-04-09 RX ORDER — PANTOPRAZOLE SODIUM 40 MG/10ML
40 INJECTION, POWDER, LYOPHILIZED, FOR SOLUTION INTRAVENOUS 2 TIMES DAILY
Status: DISCONTINUED | OUTPATIENT
Start: 2025-04-09 | End: 2025-04-09 | Stop reason: HOSPADM

## 2025-04-09 RX ORDER — DIPHENHYDRAMINE HYDROCHLORIDE 50 MG/ML
12.5 INJECTION, SOLUTION INTRAMUSCULAR; INTRAVENOUS ONCE AS NEEDED
OUTPATIENT
Start: 2025-04-09

## 2025-04-09 RX ORDER — ACETAMINOPHEN 500 MG
5000 TABLET ORAL DAILY
COMMUNITY

## 2025-04-09 RX ORDER — DEXTROSE 50 % IN WATER (D50W) INTRAVENOUS SYRINGE
25
Status: DISCONTINUED | OUTPATIENT
Start: 2025-04-09 | End: 2025-04-09 | Stop reason: HOSPADM

## 2025-04-09 RX ORDER — TIRZEPATIDE 5 MG/.5ML
5 INJECTION, SOLUTION SUBCUTANEOUS
COMMUNITY
Start: 2025-03-22

## 2025-04-09 RX ORDER — ONDANSETRON 4 MG/1
4 TABLET, ORALLY DISINTEGRATING ORAL EVERY 8 HOURS PRN
Status: DISCONTINUED | OUTPATIENT
Start: 2025-04-09 | End: 2025-04-09 | Stop reason: HOSPADM

## 2025-04-09 RX ORDER — DEXTROSE 50 % IN WATER (D50W) INTRAVENOUS SYRINGE
12.5
Status: DISCONTINUED | OUTPATIENT
Start: 2025-04-09 | End: 2025-04-09 | Stop reason: HOSPADM

## 2025-04-09 RX ORDER — METOPROLOL SUCCINATE 25 MG/1
12.5 TABLET, EXTENDED RELEASE ORAL DAILY
Status: DISCONTINUED | OUTPATIENT
Start: 2025-04-09 | End: 2025-04-09 | Stop reason: HOSPADM

## 2025-04-09 RX ORDER — FENTANYL CITRATE 50 UG/ML
INJECTION, SOLUTION INTRAMUSCULAR; INTRAVENOUS CONTINUOUS PRN
Status: DISCONTINUED | OUTPATIENT
Start: 2025-04-09 | End: 2025-04-10 | Stop reason: HOSPADM

## 2025-04-09 RX ORDER — ROSUVASTATIN CALCIUM 20 MG/1
40 TABLET, COATED ORAL DAILY
Status: DISCONTINUED | OUTPATIENT
Start: 2025-04-09 | End: 2025-04-09 | Stop reason: HOSPADM

## 2025-04-09 RX ORDER — SODIUM CHLORIDE, SODIUM LACTATE, POTASSIUM CHLORIDE, CALCIUM CHLORIDE 600; 310; 30; 20 MG/100ML; MG/100ML; MG/100ML; MG/100ML
100 INJECTION, SOLUTION INTRAVENOUS CONTINUOUS
OUTPATIENT
Start: 2025-04-09 | End: 2025-04-10

## 2025-04-09 RX ORDER — INSULIN LISPRO 100 [IU]/ML
0-10 INJECTION, SOLUTION INTRAVENOUS; SUBCUTANEOUS
Status: DISCONTINUED | OUTPATIENT
Start: 2025-04-09 | End: 2025-04-09 | Stop reason: HOSPADM

## 2025-04-09 RX ORDER — MIDAZOLAM HYDROCHLORIDE 1 MG/ML
INJECTION INTRAMUSCULAR; INTRAVENOUS CONTINUOUS PRN
Status: DISCONTINUED | OUTPATIENT
Start: 2025-04-09 | End: 2025-04-10 | Stop reason: HOSPADM

## 2025-04-09 RX ORDER — HYDROMORPHONE HYDROCHLORIDE 0.2 MG/ML
0.2 INJECTION INTRAMUSCULAR; INTRAVENOUS; SUBCUTANEOUS EVERY 5 MIN PRN
OUTPATIENT
Start: 2025-04-09

## 2025-04-09 RX ORDER — CLOPIDOGREL BISULFATE 75 MG/1
75 TABLET ORAL DAILY
Status: DISCONTINUED | OUTPATIENT
Start: 2025-04-09 | End: 2025-04-09 | Stop reason: HOSPADM

## 2025-04-09 RX ORDER — LIDOCAINE HYDROCHLORIDE 10 MG/ML
0.1 INJECTION, SOLUTION INFILTRATION; PERINEURAL ONCE
OUTPATIENT
Start: 2025-04-09 | End: 2025-04-09

## 2025-04-09 RX ORDER — MORPHINE SULFATE 2 MG/ML
2 INJECTION, SOLUTION INTRAMUSCULAR; INTRAVENOUS EVERY 4 HOURS PRN
Status: DISCONTINUED | OUTPATIENT
Start: 2025-04-09 | End: 2025-04-09 | Stop reason: HOSPADM

## 2025-04-09 RX ORDER — ONDANSETRON HYDROCHLORIDE 2 MG/ML
4 INJECTION, SOLUTION INTRAVENOUS ONCE AS NEEDED
OUTPATIENT
Start: 2025-04-09

## 2025-04-09 RX ORDER — SODIUM CHLORIDE, SODIUM LACTATE, POTASSIUM CHLORIDE, CALCIUM CHLORIDE 600; 310; 30; 20 MG/100ML; MG/100ML; MG/100ML; MG/100ML
100 INJECTION, SOLUTION INTRAVENOUS CONTINUOUS
Status: DISCONTINUED | OUTPATIENT
Start: 2025-04-09 | End: 2025-04-09

## 2025-04-09 RX ORDER — MORPHINE SULFATE 2 MG/ML
1 INJECTION, SOLUTION INTRAMUSCULAR; INTRAVENOUS EVERY 4 HOURS PRN
Status: DISCONTINUED | OUTPATIENT
Start: 2025-04-09 | End: 2025-04-09 | Stop reason: HOSPADM

## 2025-04-09 RX ORDER — ASPIRIN 81 MG/1
81 TABLET ORAL DAILY
Status: DISCONTINUED | OUTPATIENT
Start: 2025-04-09 | End: 2025-04-09 | Stop reason: HOSPADM

## 2025-04-09 RX ORDER — PROPOFOL 10 MG/ML
INJECTION, EMULSION INTRAVENOUS CONTINUOUS PRN
Status: DISCONTINUED | OUTPATIENT
Start: 2025-04-09 | End: 2025-04-10 | Stop reason: HOSPADM

## 2025-04-09 RX ORDER — ONDANSETRON HYDROCHLORIDE 2 MG/ML
4 INJECTION, SOLUTION INTRAVENOUS EVERY 8 HOURS PRN
Status: DISCONTINUED | OUTPATIENT
Start: 2025-04-09 | End: 2025-04-09 | Stop reason: HOSPADM

## 2025-04-09 RX ADMIN — ROSUVASTATIN 40 MG: 20 TABLET, FILM COATED ORAL at 10:03

## 2025-04-09 RX ADMIN — SODIUM CHLORIDE, SODIUM LACTATE, POTASSIUM CHLORIDE, AND CALCIUM CHLORIDE: .6; .31; .03; .02 INJECTION, SOLUTION INTRAVENOUS at 08:04

## 2025-04-09 RX ADMIN — PANTOPRAZOLE SODIUM 40 MG: 40 INJECTION, POWDER, FOR SOLUTION INTRAVENOUS at 02:09

## 2025-04-09 RX ADMIN — SODIUM CHLORIDE, SODIUM LACTATE, POTASSIUM CHLORIDE, AND CALCIUM CHLORIDE 100 ML/HR: .6; .31; .03; .02 INJECTION, SOLUTION INTRAVENOUS at 02:13

## 2025-04-09 RX ADMIN — INSULIN LISPRO 4 UNITS: 100 INJECTION, SOLUTION INTRAVENOUS; SUBCUTANEOUS at 16:06

## 2025-04-09 RX ADMIN — SODIUM CHLORIDE, SODIUM LACTATE, POTASSIUM CHLORIDE, AND CALCIUM CHLORIDE 100 ML/HR: .6; .31; .03; .02 INJECTION, SOLUTION INTRAVENOUS at 10:09

## 2025-04-09 RX ADMIN — METOPROLOL SUCCINATE 12.5 MG: 25 TABLET, EXTENDED RELEASE ORAL at 10:02

## 2025-04-09 RX ADMIN — ASPIRIN 81 MG: 81 TABLET, COATED ORAL at 10:03

## 2025-04-09 RX ADMIN — PANTOPRAZOLE SODIUM 40 MG: 40 INJECTION, POWDER, FOR SOLUTION INTRAVENOUS at 10:04

## 2025-04-09 SDOH — ECONOMIC STABILITY: FOOD INSECURITY: HOW HARD IS IT FOR YOU TO PAY FOR THE VERY BASICS LIKE FOOD, HOUSING, MEDICAL CARE, AND HEATING?: NOT HARD AT ALL

## 2025-04-09 SDOH — SOCIAL STABILITY: SOCIAL INSECURITY
WITHIN THE LAST YEAR, HAVE YOU BEEN RAPED OR FORCED TO HAVE ANY KIND OF SEXUAL ACTIVITY BY YOUR PARTNER OR EX-PARTNER?: NO

## 2025-04-09 SDOH — SOCIAL STABILITY: SOCIAL INSECURITY: HAVE YOU HAD THOUGHTS OF HARMING ANYONE ELSE?: NO

## 2025-04-09 SDOH — SOCIAL STABILITY: SOCIAL INSECURITY: ARE THERE ANY APPARENT SIGNS OF INJURIES/BEHAVIORS THAT COULD BE RELATED TO ABUSE/NEGLECT?: NO

## 2025-04-09 SDOH — ECONOMIC STABILITY: FOOD INSECURITY: WITHIN THE PAST 12 MONTHS, THE FOOD YOU BOUGHT JUST DIDN'T LAST AND YOU DIDN'T HAVE MONEY TO GET MORE.: NEVER TRUE

## 2025-04-09 SDOH — SOCIAL STABILITY: SOCIAL INSECURITY: WITHIN THE LAST YEAR, HAVE YOU BEEN HUMILIATED OR EMOTIONALLY ABUSED IN OTHER WAYS BY YOUR PARTNER OR EX-PARTNER?: NO

## 2025-04-09 SDOH — ECONOMIC STABILITY: HOUSING INSECURITY: IN THE LAST 12 MONTHS, WAS THERE A TIME WHEN YOU WERE NOT ABLE TO PAY THE MORTGAGE OR RENT ON TIME?: NO

## 2025-04-09 SDOH — HEALTH STABILITY: PHYSICAL HEALTH: ON AVERAGE, HOW MANY MINUTES DO YOU ENGAGE IN EXERCISE AT THIS LEVEL?: 50 MIN

## 2025-04-09 SDOH — SOCIAL STABILITY: SOCIAL INSECURITY: ABUSE: ADULT

## 2025-04-09 SDOH — ECONOMIC STABILITY: FOOD INSECURITY: WITHIN THE PAST 12 MONTHS, YOU WORRIED THAT YOUR FOOD WOULD RUN OUT BEFORE YOU GOT THE MONEY TO BUY MORE.: NEVER TRUE

## 2025-04-09 SDOH — HEALTH STABILITY: PHYSICAL HEALTH: ON AVERAGE, HOW MANY DAYS PER WEEK DO YOU ENGAGE IN MODERATE TO STRENUOUS EXERCISE (LIKE A BRISK WALK)?: 5 DAYS

## 2025-04-09 SDOH — SOCIAL STABILITY: SOCIAL INSECURITY
WITHIN THE LAST YEAR, HAVE YOU BEEN KICKED, HIT, SLAPPED, OR OTHERWISE PHYSICALLY HURT BY YOUR PARTNER OR EX-PARTNER?: NO

## 2025-04-09 SDOH — ECONOMIC STABILITY: TRANSPORTATION INSECURITY: IN THE PAST 12 MONTHS, HAS LACK OF TRANSPORTATION KEPT YOU FROM MEDICAL APPOINTMENTS OR FROM GETTING MEDICATIONS?: NO

## 2025-04-09 SDOH — SOCIAL STABILITY: SOCIAL INSECURITY: WITHIN THE LAST YEAR, HAVE YOU BEEN AFRAID OF YOUR PARTNER OR EX-PARTNER?: NO

## 2025-04-09 SDOH — SOCIAL STABILITY: SOCIAL INSECURITY: DOES ANYONE TRY TO KEEP YOU FROM HAVING/CONTACTING OTHER FRIENDS OR DOING THINGS OUTSIDE YOUR HOME?: NO

## 2025-04-09 SDOH — SOCIAL STABILITY: SOCIAL INSECURITY: DO YOU FEEL ANYONE HAS EXPLOITED OR TAKEN ADVANTAGE OF YOU FINANCIALLY OR OF YOUR PERSONAL PROPERTY?: NO

## 2025-04-09 SDOH — SOCIAL STABILITY: SOCIAL INSECURITY: ARE YOU OR HAVE YOU BEEN THREATENED OR ABUSED PHYSICALLY, EMOTIONALLY, OR SEXUALLY BY ANYONE?: NO

## 2025-04-09 SDOH — ECONOMIC STABILITY: HOUSING INSECURITY: AT ANY TIME IN THE PAST 12 MONTHS, WERE YOU HOMELESS OR LIVING IN A SHELTER (INCLUDING NOW)?: NO

## 2025-04-09 SDOH — SOCIAL STABILITY: SOCIAL INSECURITY: DO YOU FEEL UNSAFE GOING BACK TO THE PLACE WHERE YOU ARE LIVING?: NO

## 2025-04-09 SDOH — SOCIAL STABILITY: SOCIAL INSECURITY: HAS ANYONE EVER THREATENED TO HURT YOUR FAMILY OR YOUR PETS?: NO

## 2025-04-09 SDOH — ECONOMIC STABILITY: INCOME INSECURITY: IN THE PAST 12 MONTHS HAS THE ELECTRIC, GAS, OIL, OR WATER COMPANY THREATENED TO SHUT OFF SERVICES IN YOUR HOME?: NO

## 2025-04-09 SDOH — ECONOMIC STABILITY: HOUSING INSECURITY: IN THE PAST 12 MONTHS, HOW MANY TIMES HAVE YOU MOVED WHERE YOU WERE LIVING?: 0

## 2025-04-09 SDOH — SOCIAL STABILITY: SOCIAL INSECURITY: HAVE YOU HAD ANY THOUGHTS OF HARMING ANYONE ELSE?: NO

## 2025-04-09 SDOH — SOCIAL STABILITY: SOCIAL INSECURITY: WERE YOU ABLE TO COMPLETE ALL THE BEHAVIORAL HEALTH SCREENINGS?: YES

## 2025-04-09 ASSESSMENT — LIFESTYLE VARIABLES
SKIP TO QUESTIONS 9-10: 1
AUDIT-C TOTAL SCORE: 0
AUDIT-C TOTAL SCORE: 0
HOW MANY STANDARD DRINKS CONTAINING ALCOHOL DO YOU HAVE ON A TYPICAL DAY: PATIENT DOES NOT DRINK
HOW OFTEN DO YOU HAVE A DRINK CONTAINING ALCOHOL: NEVER
HOW OFTEN DO YOU HAVE 6 OR MORE DRINKS ON ONE OCCASION: NEVER

## 2025-04-09 ASSESSMENT — ACTIVITIES OF DAILY LIVING (ADL)
GROOMING: INDEPENDENT
BATHING: INDEPENDENT
FEEDING YOURSELF: INDEPENDENT
LACK_OF_TRANSPORTATION: NO
ADEQUATE_TO_COMPLETE_ADL: YES
LACK_OF_TRANSPORTATION: NO
JUDGMENT_ADEQUATE_SAFELY_COMPLETE_DAILY_ACTIVITIES: YES
HEARING - LEFT EAR: FUNCTIONAL
PATIENT'S MEMORY ADEQUATE TO SAFELY COMPLETE DAILY ACTIVITIES?: YES
LACK_OF_TRANSPORTATION: NO
HEARING - RIGHT EAR: FUNCTIONAL
LACK_OF_TRANSPORTATION: NO
WALKS IN HOME: INDEPENDENT
DRESSING YOURSELF: INDEPENDENT
TOILETING: INDEPENDENT

## 2025-04-09 ASSESSMENT — ENCOUNTER SYMPTOMS
SHORTNESS OF BREATH: 0
ABDOMINAL PAIN: 1
PSYCHIATRIC NEGATIVE: 1
MUSCULOSKELETAL NEGATIVE: 1
NEUROLOGICAL NEGATIVE: 1
VOMITING: 0
EYES NEGATIVE: 1
NAUSEA: 1
ABDOMINAL DISTENTION: 1
HEMATOLOGIC/LYMPHATIC NEGATIVE: 1
FEVER: 0
CONSTIPATION: 1
ABDOMINAL PAIN: 0
ALLERGIC/IMMUNOLOGIC NEGATIVE: 1
CARDIOVASCULAR NEGATIVE: 1
DIARRHEA: 1
RESPIRATORY NEGATIVE: 1
ABDOMINAL DISTENTION: 0
CONSTITUTIONAL NEGATIVE: 1

## 2025-04-09 ASSESSMENT — COGNITIVE AND FUNCTIONAL STATUS - GENERAL
WALKING IN HOSPITAL ROOM: A LITTLE
MOBILITY SCORE: 22
WALKING IN HOSPITAL ROOM: A LITTLE
CLIMB 3 TO 5 STEPS WITH RAILING: A LITTLE
MOBILITY SCORE: 22
PATIENT BASELINE BEDBOUND: NO
CLIMB 3 TO 5 STEPS WITH RAILING: A LITTLE
DAILY ACTIVITIY SCORE: 24
DAILY ACTIVITIY SCORE: 24

## 2025-04-09 ASSESSMENT — PAIN SCALES - GENERAL
PAINLEVEL_OUTOF10: 0 - NO PAIN
PAINLEVEL_OUTOF10: 0 - NO PAIN
PAINLEVEL_OUTOF10: 1

## 2025-04-09 ASSESSMENT — PAIN - FUNCTIONAL ASSESSMENT: PAIN_FUNCTIONAL_ASSESSMENT: 0-10

## 2025-04-09 NOTE — CONSULTS
"Inpatient consult to Cardiology  Consult performed by: MELISSA Bedoya-CNP  Consult ordered by: Janet Cuba MD  Reason for consult: Patient with PMH of CAD s/p PCI, afib s/p ablation, for pre-surgical evaluation  Assessment/Recommendations: Patient has elected not to pursue surgical intervention at this time   Please resume Aspirin / Clopidogrel   We will obtain a transthoracic echocardiogram for structural evaluation including ejection fraction, assessment of regional wall motion abnormalities or valvular disease, and further evaluation of hemodynamics.  Follow up within 2 weeks with Dr Henry for additional questions or concerns.//           History Of Present Illness:    Outpt Cardiologist Dr. Balta Henry:  Harpal Covarrubias is a 65 y.o. male with a past medical history of CAD with a CT calcium score of 1916 status post PCI to a codominant left circumflex in the setting of a NSTEMI with recovery of his LV function, hypertension, hyperlipidemia, type II non-insulin-requiring diabetes, chronic kidney disease, remote atrial fibrillation status post ablation not on anticoagulation, and obesity. Patient reportedly presents with c/o GI illness with nausea and diarrhea for several days with pain and swelling around umbilicus.  CT of abdomen and pelvis with IV contrast reveals umbilical hernia with stranding of the fatty plane around to the umbilical hernia indicating inflammation.  Surgery team is following and patient is planned for hernia repair procedure. Cardiology consult for \"Patient with PMH of CAD s/p PCI, afib s/p ablation, for pre-surgical evaluation.\"    Patient reports he is here for worsening symptoms of abd discomfort, nausea w/o emesis and diarrhea over 1.5 weeks. He states that he had missed several doses of his medications including ASA and plavix d/t symptomatology. Last dose of ASA and plavix was reportedly Sunday 4/6/25. He does add he had brief heart burn which radiated from abd " discomfort>clarified was not equivalent to his MI pain resulting in PCI intervention 11/22. He denies any associated s/s of dyspnea, palpitations, dizziness, lightheadedness, PND, orthopnea, MCGUIRE, weight gain, lower extremity swelling, falls or syncope.  Currently, after pain meds and IV fluids and reduction of his hernia over night, he reports feeling comfortable at rest.    Patient is followed by cardiologist Dr. Balta Henry, last office visit 1/14/25; he was reportedly asymptomatic from a cardiac perspective.  He was encouraged to continue with weight loss and cardiovascular medications; follow-up with cardiology in 6 months.    Hospital course:  Initial EKG: Sinus rhythm with first-degree AV block, HR 68  Arrival vital signs: Afebrile 98.5, HR 85, /75, 94% on room air  Current vital signs: /72, HR 86, 95% on room air  Pertinent imaging/Labs: HS trop 10/10, WBC 10.6, Hgb 18.6, , , K3.8, CR 1.05, mag 1.85, lactate 0.9, influenza and COVID testing negative, chest x-ray lungs clear  ED medications: Zofran 4 mg IV x 1, morphine 4 mg IV x 1, NS bolus 1 L    Inpatient EKG 4/9/25 sinus rhythm with first-degree AV block, HR 68          Home CV meds:  Aspirin 81 mg p.o. daily, Plavix 75 mg p.o. daily, Jardiance 25 mg p.o. daily, metoprolol XL 12.5 mg p.o. daily, rosuvastatin 40 mg p.o. daily, other meds: Mounjaro 2.5 mg subcutaneously weekly      All other systems reviewed and negative unless as mentioned in HPI.       Past Medical/ Surgical History:  CAD with a CT calcium score of 1916 status post PCI to a codominant left circumflex in the setting of a NSTEMI  Hypertension  Hyperlipidemia  Type II non-insulin-requiring diabetes  Chronic kidney disease  Remote atrial fibrillation s/p ablation not on anticoagulation  Obesity      Social History:  Denies smoking, alcohol or illicit drug use     Family History:  Reviewed, not pertinent to presenting problem      Past Cardiology Tests:  Echocardiogram  2023:  CONCLUSIONS:  1. Left ventricular systolic function is low normal with a 50-55% estimated ejection fraction.  2. Mid inferoseptal segment is abnormal.  3. Spectral Doppler shows an impaired relaxation pattern of left ventricular diastolic filling.    Left heart cath, indication NSTEMI 22:  CONCLUSIONS:   1. Severe single vessel CAD in a codominant system.   2. Successful OCT guided PCI of the proximal-mid LCx with a 3.0x18mm Resolute [place] MARIELLA postdilated up to 3.25mm.   3. Moderate nonobstructive disease of LAD and RCA.   4. Left Ventricular end-diastolic pressure = 15.   5. Normal LVEDP.   6. No aortic stenosis.         Allergies:  Nitroglycerin    ROS:  10 point review of systems including (Constitutional, Eyes, ENMT, Respiratory, Cardiac, Gastrointestinal, Neurological, Psychiatric, and Hematologic) was performed and is otherwise negative.    Objective Data:  Last Recorded Vitals:  Vitals:    25 0131 25 0200 25 0215 25 0250   BP: 147/82  115/62 148/72   BP Location:    Right arm   Pulse: 82  81 86   Resp: 16  16 18   Temp:    36.7 °C (98 °F)   TempSrc:    Oral   SpO2: (!) 93% 94% 94% 95%   Weight:       Height:         Medical Gas Therapy: None (Room air)  Weight  Av.7 kg (200 lb)  Min: 90.7 kg (200 lb)  Max: 90.7 kg (200 lb)      LABS:  CMP:  Results from last 7 days   Lab Units 258 25  1314   [facility] SODIUM mmol/L  --  139   SODIUM mmol/L 140  --    [facility] POTASSIUM mmol/L  --  5.1   POTASSIUM mmol/L 3.8  --    [facility] CHLORIDE mmol/L  --  102   CHLORIDE mmol/L 106  --    [facility] CO2 mmol/L  --  25   CO2 mmol/L 23  --    [facility] ANION GAP mmol/L (calc)  --  12   ANION GAP mmol/L 15  --    BUN mg/dL 16  --    [facility] BUN mg/dL  --  22   CREATININE mg/dL 1.05  --    [facility] CREATININE mg/dL  --  1.14   [facility] EGFR mL/min/1.73m2  --  71   EGFR mL/min/1.73m*2 78  --    MAGNESIUM mg/dL 1.85  --    [facility] ALBUMIN g/dL  --  4.5   ALBUMIN g/dL 4.1  --    [facility] ALT U/L   "--  20   ALT U/L 19  --    [facility] AST U/L  --  18   AST U/L 18  --    BILIRUBIN TOTAL mg/dL 1.0  --    [facility] BILIRUBIN TOTAL mg/dL  --  0.9   LIPASE U/L 25  --      CBC:  Results from last 7 days   Lab Units 04/09/25  0638 04/08/25 2128 04/07/25  1314   WBC AUTO x10*3/uL 8.8 10.6  --    [facility] WBC AUTO [place]/uL  --   --  9.5   HEMOGLOBIN g/dL 16.9 18.6*  --    [facility] HEMOGLOBIN g/dL  --   --  18.4*   HEMATOCRIT % 48.8 55.3*  --    [facility] HEMATOCRIT %  --   --  54.9*   PLATELETS AUTO x10*3/uL 202 226  --    [facility] PLATELETS AUTO [place]/uL  --   --  233   MCV fL 86 87  --    [facility] MCV fL  --   --  88.5     COAG:   Results from last 7 days   Lab Units 04/08/25 2128   INR  1.1     ABO: No results found for: \"ABO\"  HEME/ENDO:     CARDIAC:   Results from last 7 days   Lab Units 04/08/25  2256 04/08/25 2128   TROPHS ng/L 10 10             Last I/O:    Intake/Output Summary (Last 24 hours) at 4/9/2025 0713  Last data filed at 4/9/2025 0607  Gross per 24 hour   Intake 1390 ml   Output --   Net 1390 ml     Net IO Since Admission: 1,390 mL [04/09/25 0713]            Inpatient Medications:  Scheduled medications   Medication Dose Route Frequency    aspirin  81 mg oral Daily    [Held by provider] clopidogrel  75 mg oral Daily    insulin lispro  0-10 Units subcutaneous TID AC    metoprolol succinate XL  12.5 mg oral Daily    oxygen   inhalation Continuous - Inhalation    pantoprazole  40 mg intravenous BID    rosuvastatin  40 mg oral Daily     PRN medications   Medication    dextrose    dextrose    glucagon    glucagon    morphine    morphine    ondansetron ODT    Or    ondansetron     Continuous Medications   Medication Dose Last Rate    lactated Ringer's  100 mL/hr 100 mL/hr (04/09/25 0607)       Inpatient Medications:  Scheduled medications   Medication Dose Route Frequency    aspirin  81 mg oral Daily    [Held by provider] clopidogrel  75 mg oral Daily    insulin lispro  0-10 Units subcutaneous TID AC    metoprolol " "succinate XL  12.5 mg oral Daily    oxygen   inhalation Continuous - Inhalation    pantoprazole  40 mg intravenous BID    rosuvastatin  40 mg oral Daily     PRN medications   Medication    dextrose    dextrose    glucagon    glucagon    morphine    morphine    ondansetron ODT    Or    ondansetron     Continuous Medications   Medication Dose Last Rate    lactated Ringer's  100 mL/hr 100 mL/hr (04/09/25 0607)     Outpatient Medications:  Current Outpatient Medications   Medication Instructions    aspirin 81 mg EC tablet 1 tablet, Daily    Bacillus coagulans (Digestive Advantage Prob Gummy) 250 million cell tablet,chewable Chew.    blood sugar diagnostic (OneTouch Verio test strips) strip Use to test sugars twice a day    candesartan (ATACAND) 8 mg, oral, Daily    cholecalciferol (Vitamin D-3) 1,250 mcg (50,000 unit) capsule 1 capsule, Weekly    clopidogrel (PLAVIX) 75 mg, oral, Daily    FreeStyle Ashley 3 Sensor device     insulin glargine (TOUJEO MAX U-300 SOLOSTAR) 36 Units, subcutaneous, Nightly    Jardiance 25 mg, oral, Daily    lactobacillus (Culturelle) 10 billion cell capsule 1 capsule, Daily    lansoprazole (Prevacid) 30 mg DR capsule TAKE 1 CAPSULE 1/2 HOUR BEFORE BREAKFAST AND DINNER    metoclopramide (REGLAN) 10 mg, oral, Every 8 hours PRN    metoprolol succinate XL (TOPROL-XL) 12.5 mg, oral, Daily    pen needle, diabetic 31 gauge x 5/16\" needle 4 times daily    rosuvastatin (CRESTOR) 40 mg, oral, Daily       Physical Exam:  General: Pleasant male, alert and oriented, NAD  HEENT:  Pupils equal and reactive.  Normocephalic.  Moist mucosa.    Neck:  No thyromegaly.  Normal Jugular Venous Pressure.  Cardiovascular:  Regular rate and rhythm.  No cardiac murmurs.  Normal S1 and S2.  Pulmonary:  Clear to auscultation bilaterally.  No supplemental oxygen  Abdomen:  Soft. tender.   Non-distended.  Positive bowel sounds.  Lower Extremities:  2+ pedal pulses. No LE edema.  Neurologic:  Cranial nerves intact.  No focal " "deficit.   Skin: Skin warm and dry, normal skin turgor.   Psychiatric: Appropriate mood and behavior     Assessment/Plan   Outpt Cardiologist Dr. Balta Henry:  Harpal Covarrubias is a 65 y.o. male with a past medical history of CAD with a CT calcium score of 1916 status post PCI to a codominant left circumflex in the setting of a NSTEMI with recovery of his LV function, hypertension, hyperlipidemia, type II non-insulin-requiring diabetes, chronic kidney disease, remote atrial fibrillation status post ablation not on anticoagulation, and obesity. Patient reportedly presents with c/o GI illness with nausea and diarrhea for several days with pain and swelling around umbilicus.  CT of abdomen and pelvis with IV contrast reveals umbilical hernia with stranding of the fatty plane around to the umbilical hernia indicating inflammation.  Surgery team is following and patient is planned for hernia repair procedure. Cardiology consult for \"Patient with PMH of CAD s/p PCI, afib s/p ablation, for pre-surgical evaluation.\"    Home CV meds:  Aspirin 81 mg p.o. daily, Plavix 75 mg p.o. daily, Jardiance 25 mg p.o. daily, metoprolol XL 12.5 mg p.o. daily, rosuvastatin 40 mg p.o. daily, other meds: Mounjaro 2.5 mg subcutaneously weekly    I reviewed all EKGs, labs and imaging reports  I reviewed EKG, sinus rhythm with a first-degree AV block    1. Cardiac risk stratification/preop cardiac risk assessment: Patient currently denies chest pain, shortness of breath, or PND.  Low to Moderate / Acceptable Risks for Cardiovascular Complications.   - Continue ASA 81 mg PO daily Indefinitely (d/t h/o PCI with Cardiac stent)    2.  CAD s/p NSTEMI with PCI/stent Hx (11/22)  On outpt ASA, Plavix and Rosuvastatin    3.  Remote atrial fibrillation s/p ablation> not on OAC  On metoprolol XL 12.5 daily  Currently sinus rhythm    4.  Hypertension.  Acceptable given current circumstances    5.  Hyperlipidemia.  Continue " "rosuvastatin      Recommendations:  No cardiac barriers to proceeding with hernia surgery  Will obtain an echo to evaluate ejection fraction  Continue with aspirin throughout surgical phase and postop without interruption  Okay to hold Plavix for surgery, but please resume when patient is cleared by surgery from a bleeding perspective  Please continue metoprolol XL without interruption d/t atrial fibrillation history  Continuous telemetry while inpatient  Further orders per Dr. Funes      Code Status:  Full Code  Tatyana Robison, APRN-CNP      ===================================================  Attending note   ===================================================  Both the DAPHNE and I have had a face to face encounter with the patient today. I have examined the patient and edited the documented physical examination as necessary.  I personally reviewed the patient's recent labs, medications, orders, EKGs, and pertinent cardiac imaging.  I have reviewed the DAPHNE's encounter note, approve the DAPHNE's documentation and have edited the note to reflect the diagnostic and therapeutic plan.    Harpal Covarrubias is a 65 y.o. male with a past medical history of CAD with a CT calcium score of 1916 status post PCI to a codominant left circumflex in the setting of a NSTEMI with recovery of his LV function, hypertension, hyperlipidemia, type II non-insulin-requiring diabetes, chronic kidney disease, remote atrial fibrillation status post ablation not on anticoagulation, and obesity. Patient reportedly presents with c/o GI illness with nausea and diarrhea for several days with pain and swelling around umbilicus.  CT of abdomen and pelvis with IV contrast reveals umbilical hernia with stranding of the fatty plane around to the umbilical hernia indicating inflammation.  Surgery team is following and patient is planned for hernia repair procedure. Cardiology consult for \"Patient with PMH of CAD s/p PCI, afib s/p ablation, for pre-surgical " "evaluation.\"    Patient reports he is here for worsening symptoms of abd discomfort, nausea w/o emesis and diarrhea over 1.5 weeks. He states that he had missed several doses of his medications including ASA and plavix d/t symptomatology. Last dose of ASA and plavix was reportedly Sunday 4/6/25. He does add he had brief heart burn which radiated from abd discomfort, different than his anginal equivalent pain.     . He denies any associated s/s of dyspnea, palpitations, dizziness, lightheadedness, PND, orthopnea, MCGUIRE, weight gain, lower extremity swelling, falls or syncope.  Currently, after pain meds and IV fluids and reduction of his hernia over night, he reports feeling comfortable at rest.    Patient is followed by cardiologist Dr. Balta Henry, last office visit 1/14/25; he was reportedly asymptomatic from a cardiac perspective.  He was encouraged to continue with weight loss and cardiovascular medications; follow-up with cardiology in 6 months.      No exacerbating or relieving factors.  Patient denies chest pain and angina.  Pt denies shortness of breath, dyspnea on exertion, orthopnea, and paroxysmal nocturnal dyspnea.  Pt denies worsening lower extremity edema.  Pt denies palpitations or syncope.  No recent falls.  No fever or chills.  No cough.  No change in bowel or bladder habits.  No sick contacts.  No recent travel.     12 point review of systems including (Constitutional, Eyes, ENMT, Respiratory, Cardiac, Gastrointestinal, Neurological, Psychiatric, and Hematologic) was performed and is otherwise negative.    Past medical history:  As above.    Medications were reviewed.    Allergies were reviewed.    Social history:   Social History     Tobacco Use    Smoking status: Never     Passive exposure: Never    Smokeless tobacco: Never   Vaping Use    Vaping status: Never Used   Substance Use Topics    Alcohol use: Not Currently    Drug use: Not Currently         Family history:    No family history on " "file.     /72 (BP Location: Right arm)   Pulse 86   Temp 36.7 °C (98 °F) (Oral)   Resp 18   Ht 1.854 m (6' 1\")   Wt 90.7 kg (200 lb)   SpO2 95%   BMI 26.39 kg/m²   General:  Patient is awake, alert, and oriented.  Patient is in no acute distress.  HEENT:  Pupils equal and reactive.  Normocephalic.  Moist mucosa.    Neck:  No thyromegaly.  Normal Jugular Venous Pressure.  Cardiovascular:  Regular rate and rhythm.  Normal S1 and S2.  Pulmonary:  Clear to auscultation bilaterally.  Abdomen:  Soft. Non-tender.   Non-distended.  Positive bowel sounds.  Lower Extremities:  2+ pedal pulses. No LE edema.  Neurologic:  Cranial nerves intact.  No focal deficit.   Skin: Skin warm and dry, normal skin turgor.   Psychiatric: Normal affect.    Vital signs, telemetry, medications, labs, and imaging were reviewed as well.    NSQIP Risk stratification for Hernia Repair Completed 4/9/2025  Moderate Risk Individual planned for a moderate risk procedure.           At this time, he elects to defer surgial intervention after long discussions with multiple healthcare teams including general surgery. He understands that we cannot predict the future, no guarantee that he will not have problems in the future. He wishes to discuss options further with his established care teams which is reasonable.     We will obtain a transthoracic echocardiogram for structural evaluation including ejection fraction, assessment of regional wall motion abnormalities or valvular disease, and further evaluation of hemodynamics to allow more discussion and guidance for risk and procedure planning.     Please resume Aspirin / Clopidogrel.  When proceeding with surgery, please continue aspirin ( and Ideally clopidogrel if at all possible) during the perioperative course.     We would recommend continuing his beta blockade in the perioperative course.     Please follow up with Dr Henry within 1-2 weeks     As surgery is cancelled, no additional cardiac " recommendations at this time.     Can be discharged at the discretion of the primary service. We did order the echocardiogram, results can be followed up in the O/P setting.          Tyler Funes DO   Division of Cardiovascular Medicine  [facility] [place]

## 2025-04-09 NOTE — CONSULTS
Reason For Consult  Incarcerated Umbilical hernia    History Of Present Illness  Harpal Covarrubias is a 65 y.o. male with PMH of DM2, GERD, MI s/p stents (on Plavix), presenting with c/o abdominal pain around umbilicus and had US abdomen done today which resulted as having a strangulated ventral hernia and was seen by his PCP who ordered the US and recommended he present to ED for evaluation. Reports having nausea, diarrhea and abdominal pain over the past 8 days and hasn't had much of an appetite with some burping/belching. Reports taking imodium on Friday and he the diarrhea stopped by Saturday but has not had a BM since last Friday. He noticed his umbilicus was hard, edematous and red over the past few days. He has been able to take his daily medications and was able to eat some Chik-jonas-a grilled nuggets and a fruit cup this evening. Has had this happen in the past but has been able to push the hernia back into place. He lost 100 lbs over the past year and felt that his hernia has gotten worse, he reports having bilateral inguinal hernias repaired over 20 years ago. Workup in ED included labs which show no leukocytosis, H&H 18.6/55.3, all other labs unremarkable, CT A/P pending. Denies any fever, chills, night sweats, CP, SOB, palpitations, diarrhea, constipation, dysuria, hematuria, hematochezia, hematemesis, flank pain. General Surgery consulted for evaluation of incarcerated umbilical hernia.      Past Medical History  He has a past medical history of Cardiovascular disease, Myocardial infarction (Multi), and Type 2 diabetes mellitus.    Surgical History  He has a past surgical history that includes Other surgical history (10/14/2022) and Other surgical history (10/14/2022).     Social History  He reports that he has never smoked. He has never been exposed to tobacco smoke. He has never used smokeless tobacco. He reports that he does not currently use alcohol. He reports that he does not currently use  "drugs.    Family History  No family history on file.     Allergies  Nitroglycerin    Review of Systems  ROS: All 10 systems were reviewed and are unremarkable except for those mentioned in HPI.      Physical Exam  Constitutional: A&Ox3, calm and cooperative, non toxic appearing, NAD.  Eyes: PERRL, EOMI  ENMT: Moist mucous membranes, no apparent injuries or lesions.  Head/Neck: NC/AT.   Cardiovascular: Normal rate and regular rhythm. 2+ equal pulses of the distal extremities.  Respiratory/Thorax: CTAB, regular respirations on RA. Good symmetric chest expansion.   Gastrointestinal: Abdomen distended, soft, umbilicus firm and TTP, erythema around umbilicus, successfully reduced at bedside with ED physician, + BS x 4, no BM  Genitourinary: voiding independently   Extremities: No peripheral edema. Moving all 4 extremities actively.   Neurological: A&Ox3.   Psychological: Appropriate mood and behavior.       Last Recorded Vitals  Blood pressure 129/85, pulse 85, temperature 36.9 °C (98.5 °F), temperature source Oral, resp. rate 16, height 1.854 m (6' 1\"), weight 90.7 kg (200 lb), SpO2 95%.    Relevant Results  Scheduled medications    Continuous medications    PRN medications    Results for orders placed or performed during the hospital encounter of 04/08/25 (from the past 24 hours)   CBC and Auto Differential   Result Value Ref Range    WBC 10.6 4.4 - 11.3 x10*3/uL    nRBC 0.0 0.0 - 0.0 /100 WBCs    RBC 6.38 (H) 4.50 - 5.90 x10*6/uL    Hemoglobin 18.6 (H) 13.5 - 17.5 g/dL    Hematocrit 55.3 (H) 41.0 - 52.0 %    MCV 87 80 - 100 fL    MCH 29.2 26.0 - 34.0 pg    MCHC 33.6 32.0 - 36.0 g/dL    RDW 13.3 11.5 - 14.5 %    Platelets 226 150 - 450 x10*3/uL    Neutrophils % 70.1 40.0 - 80.0 %    Immature Granulocytes %, Automated 0.2 0.0 - 0.9 %    Lymphocytes % 17.3 13.0 - 44.0 %    Monocytes % 8.8 2.0 - 10.0 %    Eosinophils % 3.0 0.0 - 6.0 %    Basophils % 0.6 0.0 - 2.0 %    Neutrophils Absolute 7.41 1.20 - 7.70 x10*3/uL    " Immature Granulocytes Absolute, Automated 0.02 0.00 - 0.70 x10*3/uL    Lymphocytes Absolute 1.83 1.20 - 4.80 x10*3/uL    Monocytes Absolute 0.93 0.10 - 1.00 x10*3/uL    Eosinophils Absolute 0.32 0.00 - 0.70 x10*3/uL    Basophils Absolute 0.06 0.00 - 0.10 x10*3/uL   Comprehensive metabolic panel   Result Value Ref Range    Glucose 124 (H) 74 - 99 mg/dL    Sodium 140 136 - 145 mmol/L    Potassium 3.8 3.5 - 5.3 mmol/L    Chloride 106 98 - 107 mmol/L    Bicarbonate 23 21 - 32 mmol/L    Anion Gap 15 10 - 20 mmol/L    Urea Nitrogen 16 6 - 23 mg/dL    Creatinine 1.05 0.50 - 1.30 mg/dL    eGFR 78 >60 mL/min/1.73m*2    Calcium 9.7 8.6 - 10.3 mg/dL    Albumin 4.1 3.4 - 5.0 g/dL    Alkaline Phosphatase 94 33 - 136 U/L    Total Protein 6.7 6.4 - 8.2 g/dL    AST 18 9 - 39 U/L    Bilirubin, Total 1.0 0.0 - 1.2 mg/dL    ALT 19 10 - 52 U/L   Magnesium   Result Value Ref Range    Magnesium 1.85 1.60 - 2.40 mg/dL   Lipase   Result Value Ref Range    Lipase 25 9 - 82 U/L   Protime-INR   Result Value Ref Range    Protime 11.7 9.8 - 12.4 seconds    INR 1.1 0.9 - 1.1   aPTT   Result Value Ref Range    aPTT 37 (H) 26 - 36 seconds   Troponin I, High Sensitivity, Initial   Result Value Ref Range    Troponin I, High Sensitivity 10 0 - 20 ng/L   Lactate   Result Value Ref Range    Lactate 0.9 0.4 - 2.0 mmol/L   Sars-CoV-2 and Influenza A/B PCR   Result Value Ref Range    Flu A Result Not Detected Not Detected    Flu B Result Not Detected Not Detected    Coronavirus 2019, PCR Not Detected Not Detected   Troponin, High Sensitivity, 1 Hour   Result Value Ref Range    Troponin I, High Sensitivity 10 0 - 20 ng/L     US abdomen limited    Result Date: 4/8/2025  Interpreted By:  Herberth Herrera, STUDY: US ABDOMEN LIMITED  4/8/2025 5:01 pm   INDICATION: 66 y/o   M with  Signs/Symptoms:Increasing umbilcal tenderness and fullness   COMPARISON: None.   ACCESSION NUMBER(S): [id number]   ORDERING CLINICIAN: NIKUNJ OTERO   TECHNIQUE: Limited ultrasound of the  mid abdomen was performed.   Static images were obtained for remote interpretation.   FINDINGS: Limited imaging in the area of patient's palpable abnormality demonstrates a ventral hernia containing fat and probable segment of bowel. The neck of the hernia sac measures 1.2 cm. There is fluid within the hernia sac.       Limited imaging in the area of patient's palpable abnormality demonstrates a ventral hernia containing fat and segment of bowel. The neck of the hernia sac measures 1.2 cm. There is fluid within the hernia sac. Further evaluation with dedicated CT is recommended to assess for developing strangulation. Correlate with exam to assess for reducibility.   MACRO: Herberth Herrera discussed the significance and urgency of this critical finding by telephone with  TOD PODL on 4/8/2025 at 6:49 pm. (**-RCF-**) Findings:  See findings.   Signed by: Herberth Herrera 4/8/2025 6:59 PM Dictation workstation:   [id number]    XR knee left 4+ views    Result Date: 4/4/2025  Interpreted By:  Marquise Gaspar, STUDY: XR KNEE LEFT 4+ VIEWS; ;  4/3/2025 2:56 pm   INDICATION: Signs/Symptoms:Left knee pain.   ,M25.562 Pain in left knee   COMPARISON: None.   ACCESSION NUMBER(S): [id number]   ORDERING CLINICIAN: LEONARD DEEJSUS   FINDINGS: Left knee, four views   There is no fracture. There is no dislocation. There is mild osteophytosis without joint space narrowing in all 3 compartments to include the tibial spine. There is no lytic or sclerotic lesion. There is prepatellar soft tissue edema..       Prepatellar soft tissue edema. Mild degenerative changes     MACRO: None   Signed by: Marquise Gaspar 4/4/2025 5:49 PM Dictation workstation:   [id number]    Point of Care Ultrasound    Result Date: 4/3/2025  These images are not reportable by radiology and will not be interpreted by  Radiologists.        Assessment/Plan   Incarcerated Umbilical hernia:   A/P:  - no emergent surgical intervention tonight  - hernia reduced in ED, non  toxic appearing, abdominal pain improved, abdomen soft after reduction  - NPO at MN for OR tomorrow for umbilical hernia repair  - recommend cards consult for OR clearance  - as needed antiemetics  - clinically doing well, hemodynamically stable  - monitor labs  - DVT prophylaxis: SCD/ per primary  - if persistent nausea, vomiting, worsening abdominal distention, may place NGT, ok for now to hold off.   - IS  - IVF  - prn pain management  - monitor labs  - encourage ambulation  - instructed on post operative care, s/s of infection  - continue supportive care  - Explained benefits and risks of surgery, pathophysiology of hernia and causes, wishes to think about proceeding with surgery tomorrow, will plan tentatively for surgery tomorrow  - Discussed plan with Dr. Wilkerson who will speak with patient in the morning    MELISSA Bhat-CNP  General Surgery   Available via Haiku

## 2025-04-09 NOTE — PROGRESS NOTES
[facility] Hospitalist Progress Note        Between 7AM-7PM please message me via Epic Secure Chat.  After 7PM please page Nocturnist on call.        Assessment/Plan     Acute Problems    Incarcerated umbilical hernia that was reduced in ER that had caused SBO  Possible gastritis    Chronic Problems    CAD w/ stent  HTN  HLD  Type 2 DM  CKD 2  Remote A fib s/p ablation not on AC  GERD    Plan    - plan for outpatient surgery... patient can eat if tolerates diet ok to go home today. If he does not tolerate diet will need to re-evaluate  - appreciated cardiology, cleared for OR if needed    Fluids: None  Electrolytes: Replete as needed  Nutrition: CCD  Cardozo: None  Invasive lines: None  Drains: None  O2: None    DVT Prophylaxis:  SCDs/ambulate    Discharge Planning: Home once med ready    Plan of care was discussed with patient    Jay Hodgson MD  [facility]

## 2025-04-09 NOTE — DISCHARGE SUMMARY
"[facility] Hospitalist Discharge Summary        Harpal CovarrubiasDOB: 1960MRN: [id number]    ADMIT DATE: 4/8/2025DISCHARGE DATE: 4/9/2025    PRIMARYCARE PHYSICIAN: Noé Ralph MD    VISIT STATUS: Inpatient    CODE STATUS: Full Code    CONSULTANTS:  Surgery    HOSPITAL COURSE:    Acute Problems     Incarcerated umbilical hernia that was reduced in ER that had caused SBO  Possible gastritis     Chronic Problems     CAD w/ stent  HTN  HLD  Type 2 DM  CKD 2  Remote A fib s/p ablation not on AC  GERD    His hernia was reduced in ER. He tolerated diet without abdominal pain or nausea. Discussed with surgical team, ok for DC today w/ close outpatient follow up. Correction of hernia likely would benefit him but will follow up on outpatient basis for further assessment.     The patient was advised to follow up with their PCP for further medical care in the outpatient setting. Patient will also follow up w/ surgical team    DAY OF DISCHARGE:  Review of Systems   Constitutional:  Negative for fever.   Respiratory:  Negative for shortness of breath.    Cardiovascular:  Negative for chest pain.   Gastrointestinal:  Negative for abdominal distention, abdominal pain and vomiting.       Patient Vitals for the past 24 hrs:   BP Temp Temp src Pulse Resp SpO2 Height Weight   04/09/25 1520 128/64 36.6 °C (97.9 °F) Temporal 74 18 98 % -- --   04/09/25 1220 139/74 36.8 °C (98.2 °F) Temporal 68 -- 97 % -- --   04/09/25 0250 148/72 36.7 °C (98 °F) Oral 86 18 95 % -- --   04/09/25 0215 115/62 -- -- 81 16 94 % -- --   04/09/25 0200 -- -- -- -- -- 94 % -- --   04/09/25 0131 147/82 -- -- 82 16 (!) 93 % -- --   04/09/25 0000 (!) 154/110 -- -- -- -- (!) 93 % -- --   04/08/25 2345 148/81 -- -- 75 16 95 % -- --   04/08/25 2252 129/85 -- -- 85 16 -- -- --   04/08/25 2200 -- -- -- -- -- 95 % -- --   04/08/25 2048 129/75 36.9 °C (98.5 °F) Oral 85 16 94 % 1.854 m (6' 1\") 90.7 kg (200 lb)       Average, Min, and Max forlast 24 hours Vitals:  TEMPERATURE: " Temp  Av.8 °C (98.2 °F)  Min: 36.6 °C (97.9 °F)  Max: 36.9 °C (98.5 °F)    RESPIRATIONS RANGE: Resp  Av.6  Min: 16  Max: 18    PULSE RANGE: Pulse  Av.5  Min: 68  Max: 86    BLOOD PRESSURE RANGE: Systolic (24hrs), Av , Min:115 , Max:154   ; Diastolic (24hrs), Av, Min:62, Max:110      PULSE OXIMETRYRANGE: SpO2  Av.8 %  Min: 93 %  Max: 98 %    I/O last 3 completed shifts:  In: 1390 (15.3 mL/kg) [I.V.:390 (4.3 mL/kg); IV Piggyback:1000]  Out: - (0 mL/kg)   Weight: 90.7 kg     Physical Exam  Vitals reviewed.   Constitutional:       General: He is not in acute distress.  Neurological:      Mental Status: He is alert.           Discharge Meds       Your medication list        CONTINUE taking these medications        Instructions Last Dose Given Next Dose Due   aspirin 81 mg EC tablet           candesartan 8 mg tablet  Commonly known as: Atacand      Take 1 tablet (8 mg) by mouth once daily.       clopidogrel 75 mg tablet  Commonly known as: Plavix      Take 1 tablet (75 mg) by mouth once daily.       Culturelle 10 billion cell capsule  Generic drug: lactobacillus           Digestive Advantage Prob Gummy 250 million cell tablet,chewable  Generic drug: Bacillus coagulans           FreeStyle Ashley 3 Sensor device  Generic drug: blood-glucose sensor           insulin glargine 300 unit/mL (3 mL) pen  Commonly known as: Toujeo Max U-300 SoloStar      Inject 36 Units under the skin once daily at bedtime.       Jardiance 25 mg tablet  Generic drug: empagliflozin      TAKE 1 TABLET DAILY       lansoprazole 30 mg DR capsule  Commonly known as: Prevacid      TAKE 1 CAPSULE 1/2 HOUR BEFORE BREAKFAST AND DINNER       metoclopramide 10 mg tablet  Commonly known as: Reglan      Take 1 tablet (10 mg) by mouth every 8 hours if needed (for nausea) for up to 7 days.       metoprolol succinate XL 25 mg 24 hr tablet  Commonly known as: Toprol-XL      Take 0.5 tablets (12.5 mg) by mouth once daily.       Mounjaro 5  "mg/0.5 mL pen injector  Generic drug: tirzepatide           OneTouch Verio test strips  Generic drug: blood sugar diagnostic           pen needle, diabetic 31 gauge x 5/16\" needle           rosuvastatin 40 mg tablet  Commonly known as: Crestor      Take 1 tablet (40 mg) by mouth once daily.       Vitamin D3 125 mcg (5,000 units) tablet  Generic drug: cholecalciferol                    DISPOSITION: Home    Follow up with PCP Noé Ralph MD    Outpatient Follow-Up Appointments  Future Appointments   Date Time Provider Department [place]   5/19/2025  1:30 PM Noé Ralph MD [id number] [facility]   6/18/2025  3:40 PM Balta Henry MD [id number] [facility]   1/14/2026  4:00 PM Balta Henry MD [address]       DISCHARGE TIME: > 30 minutes providing counseling or in coordination of care. Total time on this day of visit includes record and documentation review before and after visit including documentation and time not explicitly included on EMR time stamp.    Jay Hodgson MD  [facility]  "

## 2025-04-09 NOTE — ASSESSMENT & PLAN NOTE
- reduced by Dr Kemp in the ED  - seen by surgery. Surgery planned in the am  - morphine for pain  - zofran for N/V  - IVF  - NPO  - pt with cardiac history; no CP currently, or signs of heart failure. Cardiology consult for pre-surgical evaluation.     SBO  - transition point in RLQ  - CT obtained prior to reduction of umbilical hernia, therefore uncertain if pt truly has SBO  - General surgery consult  - pt without emesis currently however, if starts vomiting, NG to be placed.     CAD s/p stents  - No CP  - continue ASA  - Hold Plavix  - cont statin and BB    HTN  - hold losartan   - continue metoprolol    Gastritis   - seen on CT  - Pantoprazole IV BID    DM2  - ISS, blood glu check every 4 hrs

## 2025-04-09 NOTE — PROGRESS NOTES
Pharmacy Medication History     Source of Information: Patient    Additional concerns with the patient's PTA list.   N/a  Notified Provider via Haiku : No    The following updates were made to the Prior to Admission medication list:     Medications ADDED:   Mounjaro   Medications CHANGED:  N/a  Medications REMOVED:   N/a  Medications NOT TAKING:   N/a    Allergy reviewed : Yes    Meds 2 Beds : No    Outpatient pharmacy confirmed and updated in chart : Yes    Pharmacy name: [facility], [place]    The list below reflectives the updated PTA list. Please review each medication in order reconciliation for additional clarification and justification.    Prior to Admission Medications   Prescriptions Last Dose Informant   Bacillus coagulans (Digestive Advantage Prob Gummy) 250 million cell tablet,chewable Unknown Self   Sig: Chew.   FreeStyle Ashley 3 Sensor device Unknown Self   Jardiance 25 mg Unknown Self   Sig: TAKE 1 TABLET DAILY   Mounjaro 5 mg/0.5 mL pen injector Unknown Self   Sig: Inject 5 mg under the skin every 7 days. Sunday   aspirin 81 mg EC tablet Unknown Self   Sig: Take 1 tablet (81 mg) by mouth once daily.   blood sugar diagnostic (OneTouch Verio test strips) strip Unknown Self   Sig: Use to test sugars twice a day   candesartan (Atacand) 8 mg tablet Unknown Self   Sig: Take 1 tablet (8 mg) by mouth once daily.   cholecalciferol (Vitamin D3) 5,000 Units tablet Unknown Self   Sig: Take 1 tablet (5,000 Units) by mouth once daily.   clopidogrel (Plavix) 75 mg tablet Unknown Self   Sig: Take 1 tablet (75 mg) by mouth once daily.   insulin glargine (Toujeo Max U-300 SoloStar) 300 unit/mL (3 mL) injection Unknown Self   Sig: Inject 36 Units under the skin once daily at bedtime.   lactobacillus (Culturelle) 10 billion cell capsule Unknown Self   Sig: Take 1 capsule by mouth once daily.   lansoprazole (Prevacid) 30 mg DR capsule Unknown Self   Sig: TAKE 1 CAPSULE 1/2 HOUR BEFORE BREAKFAST AND DINNER   metoclopramide  "(Reglan) 10 mg tablet Unknown    Sig: Take 1 tablet (10 mg) by mouth every 8 hours if needed (for nausea) for up to 7 days.   metoprolol succinate XL (Toprol-XL) 25 mg 24 hr tablet Unknown Self   Sig: Take 0.5 tablets (12.5 mg) by mouth once daily.   pen needle, diabetic 31 gauge x 5/16\" needle Unknown Self   Si times a day.   rosuvastatin (Crestor) 40 mg tablet Unknown Self   Sig: Take 1 tablet (40 mg) by mouth once daily.      Facility-Administered Medications: None       The list below reflectives the updated allergy list. Please review each documented allergy for additional clarification and justification.    Allergies   Allergen Reactions    Nitroglycerin Other and Anaphylaxis     Hypotension, Passing out          25 at 11:29 AM - Geovanna Shea     "

## 2025-04-09 NOTE — ANESTHESIA PREPROCEDURE EVALUATION
Patient: Harpal Covarrubias    Procedure Information       Date/Time: 04/09/25 8738    Procedure: REPAIR, HERNIA, UMBILICAL, LAPAROSCOPIC    Location: [facility] 09 / [facility]    Surgeons: Prabhjot Wilkersno MD          Type 2 diabetes mellitus    Cardiovascular disease    Myocardial infarction (Multi)        CAD stents on ASA plavix  Incarcerated hernia     Relevant Problems   Cardiac   (+) Abnormal EKG   (+) Coronary artery disease   (+) Essential hypertension   (+) Heart block AV first degree   (+) Non-ST elevation myocardial infarction (NSTEMI), subsequent episode of care (Multi)   (+) Pure hypercholesterolemia      Pulmonary   (+) PRASHANT (obstructive sleep apnea)      GI   (+) Dysphagia   (+) GERD (gastroesophageal reflux disease)      Endocrine   (+) CKD stage 2 due to type 2 diabetes mellitus (Multi)   (+) Mild nonproliferative diabetic retinopathy of both eyes associated with type 2 diabetes mellitus       Clinical information reviewed:    Allergies  Meds               NPO Detail:  No data recorded     Physical Exam    Airway  Mallampati: II  TM distance: >3 FB  Neck ROM: full     Cardiovascular   Rhythm: regular  Rate: normal     Dental    Pulmonary   Breath sounds clear to auscultation     Abdominal            Anesthesia Plan    History of general anesthesia?: yes  History of complications of general anesthesia?: no    ASA 3     general     intravenous induction   Anesthetic plan and risks discussed with patient.    Plan discussed with CRNA.

## 2025-04-09 NOTE — TELEPHONE ENCOUNTER
Did connect w/ radiologist regarding findings on ultrasound, - possibility of early strangulation - although umbilicus was reducible yesterday -he has had worsening pain throughout the day and increased tenderness -do feel this requires more emergent attention and recommended patient not try to drive himself to the hospital but request to request squad - will follow

## 2025-04-09 NOTE — ED PROVIDER NOTES
HPI   Chief Complaint   Patient presents with    Hernia       HPI  The patient presents with GI illness for 8 days.  He states he had 5 days of diarrhea starting Imodium and has improvement of his diarrhea in last 3 days.  He states he has nausea without vomiting.  He does mention that he has had no vomitus on last 3 days.  He has had some pain and swelling now around his umbilicus.  He had an ultrasound done by his PCP and there was concern for possible strangulation.  His umbilicus is now red and inflamed and harder.      Patient History   Past Medical History:   Diagnosis Date    Cardiovascular disease     Myocardial infarction (Multi)     Type 2 diabetes mellitus      Past Surgical History:   Procedure Laterality Date    OTHER SURGICAL HISTORY  10/14/2022    Colonoscopy - [facility] - polyp f/u 2027    OTHER SURGICAL HISTORY  10/14/2022    Esophagogastroduodenoscopy - [facility]     No family history on file.  Social History     Tobacco Use    Smoking status: Never     Passive exposure: Never    Smokeless tobacco: Never   Vaping Use    Vaping status: Never Used   Substance Use Topics    Alcohol use: Not Currently    Drug use: Not Currently       Physical Exam   ED Triage Vitals [04/08/25 2048]   Temperature Heart Rate Respirations BP   36.9 °C (98.5 °F) 85 16 129/75      Pulse Ox Temp Source Heart Rate Source Patient Position   94 % Oral Monitor --      BP Location FiO2 (%)     -- --       Physical Exam  Vitals and nursing note reviewed.   Constitutional:       General: He is not in acute distress.     Appearance: He is well-developed.   HENT:      Head: Normocephalic and atraumatic.   Eyes:      Conjunctiva/sclera: Conjunctivae normal.   Cardiovascular:      Rate and Rhythm: Normal rate and regular rhythm.      Heart sounds: No murmur heard.  Pulmonary:      Effort: Pulmonary effort is normal. No respiratory distress.      Breath sounds: Normal breath sounds.   Abdominal:      Palpations: Abdomen is soft.       Tenderness: There is no abdominal tenderness.   Musculoskeletal:         General: No swelling.      Cervical back: Neck supple.   Skin:     General: Skin is warm and dry.      Capillary Refill: Capillary refill takes less than 2 seconds.   Neurological:      Mental Status: He is alert.   Psychiatric:         Mood and Affect: Mood normal.           ED Course & MDM   Diagnoses as of 04/10/25 0454   Incarcerated umbilical hernia   Small bowel obstruction (Multi)                 No data recorded     Andrés Coma Scale Score: 15 (04/09/25 1050 : Imtiaz Cee)                           Medical Decision Making  I believe clinically the patient is having signs of an incarcerated or strangulated hernia.  A CT scan is ordered.  I will place an ice pack and attempt reduction but it may have been going ongoing for the last 3 days.  I was successfully able to reduce the patient's hernia with manual pressure and 30 seconds of pushing.  I did consult surgery given the initial difficulty with reduction.  With intermittently incarcerated hernia I do feel the patient would benefit from admission for some more urgent hernia repair.  CT scan was also read as a small bowel obstruction but they state that position point was in the right lower quadrant.  Surgery is aware and will evaluate this patient.    Procedure  Procedures     Reginald Kemp MD  04/09/25 0447       Reginald Kemp MD  04/10/25 0454

## 2025-04-09 NOTE — PROGRESS NOTES
04/09/25 1348   Discharge Planning   Living Arrangements Spouse/significant other   Support Systems Spouse/significant other   Type of Residence Private residence   Expected Discharge Disposition Home   Does the patient need discharge transport arranged? No   Financial Resource Strain   How hard is it for you to pay for the very basics like food, housing, medical care, and heating? Not hard   Housing Stability   In the last 12 months, was there a time when you were not able to pay the mortgage or rent on time? N   At any time in the past 12 months, were you homeless or living in a shelter (including now)? N   Transportation Needs   In the past 12 months, has lack of transportation kept you from medical appointments or from getting medications? no   In the past 12 months, has lack of transportation kept you from meetings, work, or from getting things needed for daily living? No   Intensity of Service   Intensity of Service 0-30 min     TCC met with pt at bedside. Explained my role as discharge planner. Pt will dc home today if tolerating diet. Pt plans to schedule surgery OP sometime in May.

## 2025-04-09 NOTE — H&P
"History Of Present Illness  Harpal Covarrubias is a 65 y.o. male with a PMH of CAD s/p PCI Lcx, afib s/p ablation (not on AC), DM2, HLD, GERD, presenting with abdominal pain.    Symptoms started approximately 8 days ago, with generally not feeling well and diarrhea. Diarrhea persisted for 4 days, with nausea, no emesis. During this time he experienced excessive belching with \"rotten egg\" taste in his mouth. He contacted Dr Ralph, his PCP, who recommended Imodium. Imodium decreased the frequency of episodes of diarrhea but did resolve it initially, however, within a day of use diarrhea subsided. When the diarrhea resolved, Mr Covarrubias had his first real meal in days, during which time he drank too quickly, aspirated fluid, after which he forcefully coughed up the fluid, which he feels may have resulted in the hernia.   Thursday evening he felt a bulging in his umbilical area that was warm to the touch. He made an appointment with Dr Ralph, who was able to reduce the hernia.   Over the weekend, pain in the umbilical area worsened, his PCP ordered an US of this area, which was performed yesterday and showed a ventral hernia containing fat and a segment of bowel.   Mr Covarrubias was instructed to come to the ED for further evaluation.   Last BM 4 days PTA, last flatus one day prior to admission.     In the ED, lab work was unremarkable.   CT A/P showed umbilical herna 3 x 2.6 cm increased in size since prior as well as stranding of fatty plane around the hernia indicating inflammation.   SBO transition point RLQ.   Gastritis      Past Medical History  Past Medical History:   Diagnosis Date    Cardiovascular disease     Myocardial infarction (Multi)     Type 2 diabetes mellitus        Surgical History  Past Surgical History:   Procedure Laterality Date    OTHER SURGICAL HISTORY  10/14/2022    Colonoscopy - [facility] - polyp f/u 2027    OTHER SURGICAL HISTORY  10/14/2022    Esophagogastroduodenoscopy - [facility]        Social " History  He reports that he has never smoked. He has never been exposed to tobacco smoke. He has never used smokeless tobacco. He reports that he does not currently use alcohol. He reports that he does not currently use drugs.    Family History  No family history on file.     Allergies  Nitroglycerin    Review of Systems   Constitutional: Negative.    HENT: Negative.     Eyes: Negative.    Respiratory: Negative.     Cardiovascular: Negative.    Gastrointestinal:  Positive for abdominal distention, abdominal pain, constipation, diarrhea and nausea. Negative for vomiting.   Genitourinary: Negative.    Musculoskeletal: Negative.    Skin: Negative.    Allergic/Immunologic: Negative.    Neurological: Negative.    Hematological: Negative.    Psychiatric/Behavioral: Negative.          Physical Exam  Vitals reviewed.   Constitutional:       Appearance: Normal appearance.      Comments: Pleasant middle aged male, alert, oriented, no distress.    HENT:      Head: Normocephalic and atraumatic.      Mouth/Throat:      Mouth: Mucous membranes are moist.   Eyes:      General: No scleral icterus.     Extraocular Movements: Extraocular movements intact.      Conjunctiva/sclera: Conjunctivae normal.      Pupils: Pupils are equal, round, and reactive to light.   Cardiovascular:      Rate and Rhythm: Normal rate and regular rhythm.      Pulses: Normal pulses.      Heart sounds: Normal heart sounds.      Comments: Regular rate and rhythm, no murmur appreciated.  Pulmonary:      Effort: Pulmonary effort is normal.      Breath sounds: Normal breath sounds.      Comments: Clear to auscultation, no W/R/R  Abdominal:      General: Abdomen is flat. Bowel sounds are normal.      Palpations: Abdomen is soft.      Comments: Distended, hyperactive BS, mild bulging of the umbilicus, with mild erythema of the surrounding skin.    Musculoskeletal:         General: Normal range of motion.      Comments: No LE edema.    Skin:     General: Skin is  "warm and dry.   Neurological:      General: No focal deficit present.      Mental Status: He is alert and oriented to person, place, and time.   Psychiatric:         Mood and Affect: Mood normal.         Behavior: Behavior normal.         Thought Content: Thought content normal.         Judgment: Judgment normal.          Last Recorded Vitals  Blood pressure (!) 154/110, pulse 75, temperature 36.9 °C (98.5 °F), temperature source Oral, resp. rate 16, height 1.854 m (6' 1\"), weight 90.7 kg (200 lb), SpO2 (!) 93%.    Relevant Results        Results for orders placed or performed during the hospital encounter of 04/08/25 (from the past 24 hours)   CBC and Auto Differential   Result Value Ref Range    WBC 10.6 4.4 - 11.3 x10*3/uL    nRBC 0.0 0.0 - 0.0 /100 WBCs    RBC 6.38 (H) 4.50 - 5.90 x10*6/uL    Hemoglobin 18.6 (H) 13.5 - 17.5 g/dL    Hematocrit 55.3 (H) 41.0 - 52.0 %    MCV 87 80 - 100 fL    MCH 29.2 26.0 - 34.0 pg    MCHC 33.6 32.0 - 36.0 g/dL    RDW 13.3 11.5 - 14.5 %    Platelets 226 150 - 450 x10*3/uL    Neutrophils % 70.1 40.0 - 80.0 %    Immature Granulocytes %, Automated 0.2 0.0 - 0.9 %    Lymphocytes % 17.3 13.0 - 44.0 %    Monocytes % 8.8 2.0 - 10.0 %    Eosinophils % 3.0 0.0 - 6.0 %    Basophils % 0.6 0.0 - 2.0 %    Neutrophils Absolute 7.41 1.20 - 7.70 x10*3/uL    Immature Granulocytes Absolute, Automated 0.02 0.00 - 0.70 x10*3/uL    Lymphocytes Absolute 1.83 1.20 - 4.80 x10*3/uL    Monocytes Absolute 0.93 0.10 - 1.00 x10*3/uL    Eosinophils Absolute 0.32 0.00 - 0.70 x10*3/uL    Basophils Absolute 0.06 0.00 - 0.10 x10*3/uL   Comprehensive metabolic panel   Result Value Ref Range    Glucose 124 (H) 74 - 99 mg/dL    Sodium 140 136 - 145 mmol/L    Potassium 3.8 3.5 - 5.3 mmol/L    Chloride 106 98 - 107 mmol/L    Bicarbonate 23 21 - 32 mmol/L    Anion Gap 15 10 - 20 mmol/L    Urea Nitrogen 16 6 - 23 mg/dL    Creatinine 1.05 0.50 - 1.30 mg/dL    eGFR 78 >60 mL/min/1.73m*2    Calcium 9.7 8.6 - 10.3 mg/dL    " Albumin 4.1 3.4 - 5.0 g/dL    Alkaline Phosphatase 94 33 - 136 U/L    Total Protein 6.7 6.4 - 8.2 g/dL    AST 18 9 - 39 U/L    Bilirubin, Total 1.0 0.0 - 1.2 mg/dL    ALT 19 10 - 52 U/L   Magnesium   Result Value Ref Range    Magnesium 1.85 1.60 - 2.40 mg/dL   Lipase   Result Value Ref Range    Lipase 25 9 - 82 U/L   Protime-INR   Result Value Ref Range    Protime 11.7 9.8 - 12.4 seconds    INR 1.1 0.9 - 1.1   aPTT   Result Value Ref Range    aPTT 37 (H) 26 - 36 seconds   Troponin I, High Sensitivity, Initial   Result Value Ref Range    Troponin I, High Sensitivity 10 0 - 20 ng/L   Lactate   Result Value Ref Range    Lactate 0.9 0.4 - 2.0 mmol/L   Sars-CoV-2 and Influenza A/B PCR   Result Value Ref Range    Flu A Result Not Detected Not Detected    Flu B Result Not Detected Not Detected    Coronavirus 2019, PCR Not Detected Not Detected   Troponin, High Sensitivity, 1 Hour   Result Value Ref Range    Troponin I, High Sensitivity 10 0 - 20 ng/L   Urinalysis with Reflex Culture and Microscopic   Result Value Ref Range    Color, Urine Light-Yellow Light-Yellow, Yellow, Dark-Yellow    Appearance, Urine Clear Clear    Specific Gravity, Urine 1.015 1.005 - 1.035    pH, Urine 5.0 5.0, 5.5, 6.0, 6.5, 7.0, 7.5, 8.0    Protein, Urine 30 (1+) (A) NEGATIVE, 10 (TRACE), 20 (TRACE) mg/dL    Glucose, Urine OVER (4+) (A) Normal mg/dL    Blood, Urine NEGATIVE NEGATIVE mg/dL    Ketones, Urine 10 (1+) (A) NEGATIVE mg/dL    Bilirubin, Urine NEGATIVE NEGATIVE mg/dL    Urobilinogen, Urine Normal Normal mg/dL    Nitrite, Urine NEGATIVE NEGATIVE    Leukocyte Esterase, Urine NEGATIVE NEGATIVE   Urinalysis Microscopic   Result Value Ref Range    WBC, Urine 1-5 1-5, NONE /HPF    RBC, Urine NONE NONE, 1-2, 3-5 /HPF    Mucus, Urine FEW Reference range not established. /LPF   'CT abdomen pelvis w IV contrast    Addendum Date: 4/9/2025    This addendum is done for critical communication: STUDY: CT Abdomen and Pelvis with IV Contrast; 4/8/2025 at  10:40 PM. INDICATION: Abdominal pain, distention. COMPARISON: CT AP 7/18/2023. ACCESSION NUMBER(S): [id number] ORDERING CLINICIAN: ESTEVAN COOK TECHNIQUE: CT of the abdomen and pelvis was performed.  Contiguous axial images were obtained at 3 mm slice thickness through the abdomen and pelvis. Coronal and sagittal reconstructions at 3 mm slice thickness were performed.  Omnipaque 350 75 mL was administered intravenously.  FINDINGS: LOWER CHEST: No cardiomegaly.  No pericardial effusion.  Bibasal atelectatic bands.  ABDOMEN:  LIVER: No hepatomegaly.  Smooth surface contour.  Normal attenuation. Punctate hypodensity within the liver likely due to small cysts.  BILE DUCTS: No intrahepatic or extrahepatic biliary ductal dilatation.  GALLBLADDER: The gallbladder is present and shows small calcific cholelithiasis without evidence of wall thickening or pericholecystic fluid collection.. STOMACH: Gastric wall thickening measure up to 1.64 cm.  Findings might suggest gastritis.  Clinical correlation may be helpful PANCREAS: No masses or ductal dilatation.  SPLEEN: No splenomegaly or focal splenic lesion.  Small splenule adjacent to the splenic hilum.  ADRENAL GLANDS: No thickening or nodules.  KIDNEYS AND URETERS: Kidneys are normal in size and location.  No renal or ureteral calculi.  PELVIS:  BLADDER: Overdistended urinary bladder  REPRODUCTIVE ORGANS: Prostatomegaly  BOWEL: Fluid-filled dilated small bowel down to the level of right lower quadrant which suggest small bowel intestinal obstruction. Diffuse diverticulosis without diverticulitis. VESSELS: No abnormalities identified.  Abdominal aorta is normal in caliber.  PERITONEUM/RETROPERITONEUM/LYMPH NODES: No free fluid.  No pneumoperitoneum. No lymphadenopathy.  ABDOMINAL WALL: Umbilical hernia measuring 3 x 2.6 cm increased since the prior. There is stranding of fatty plane around to the umbilical hernia indicate inflammation. SOFT TISSUES: No abnormalities  identified.  BONES: No acute fracture or aggressive osseous lesion.  Multilevel degenerative changes of visualized spine    Result Date: 4/9/2025  STUDY: CT Abdomen and Pelvis with IV Contrast; 4/8/2025 at 10:40 PM. INDICATION: Abdominal pain, distention. COMPARISON: CT AP 7/18/2023. ACCESSION NUMBER(S): [id number] ORDERING CLINICIAN: ESTEVAN COOK TECHNIQUE: CT of the abdomen and pelvis was performed.  Contiguous axial images were obtained at 3 mm slice thickness through the abdomen and pelvis. Coronal and sagittal reconstructions at 3 mm slice thickness were performed.  Omnipaque 350 75 mL was administered intravenously.  FINDINGS: LOWER CHEST: No cardiomegaly.  No pericardial effusion.  Bibasal atelectatic bands.  ABDOMEN:  LIVER: No hepatomegaly.  Smooth surface contour.  Normal attenuation. Punctate hypodensity within the liver likely due to small cysts.  BILE DUCTS: No intrahepatic or extrahepatic biliary ductal dilatation.  GALLBLADDER: The gallbladder is present and shows small calcific cholelithiasis without evidence of wall thickening or pericholecystic fluid collection.. STOMACH: Gastric wall thickening measure up to 1.64 cm.  Findings might suggest gastritis.  Clinical correlation may be helpful PANCREAS: No masses or ductal dilatation.  SPLEEN: No splenomegaly or focal splenic lesion.  Small splenule adjacent to the splenic hilum.  ADRENAL GLANDS: No thickening or nodules.  KIDNEYS AND URETERS: Kidneys are normal in size and location.  No renal or ureteral calculi.  PELVIS:  BLADDER: Overdistended urinary bladder  REPRODUCTIVE ORGANS: Prostatomegaly  BOWEL: Fluid-filled dilated small bowel down to the level of right lower quadrant which suggest small bowel intestinal obstruction. Diffuse diverticulosis without diverticulitis. VESSELS: No abnormalities identified.  Abdominal aorta is normal in caliber.  PERITONEUM/RETROPERITONEUM/LYMPH NODES: No free fluid.  No pneumoperitoneum. No  lymphadenopathy.  ABDOMINAL WALL: Umbilical hernia measuring 3 x 2.6 cm increased since the prior. SOFT TISSUES: No abnormalities identified.  BONES: No acute fracture or aggressive osseous lesion.  Multilevel degenerative changes of visualized spine    Small bowel obstruction with transition zone in the right lower quadrant. Diffuse diverticulosis without diverticulitis. Gastric wall thickening which suggest gastritis. The gallbladder is present and shows small calcific cholelithiasis without evidence of wall thickening or pericholecystic fluid collection.. No biliary tree dilatation. Interval increase in the size of umbilicus hernia.  There is stranding of fatty plane around to the umbilical hernia indicate inflammation. Prostatomegaly over distended gallbladder Signed by Pedro Cameron MD    US abdomen limited    Result Date: 4/8/2025  Interpreted By:  Herberth Herrera, STUDY: US ABDOMEN LIMITED  4/8/2025 5:01 pm   INDICATION: 64 y/o   M with  Signs/Symptoms:Increasing umbilcal tenderness and fullness   COMPARISON: None.   ACCESSION NUMBER(S): [id number]   ORDERING CLINICIAN: NIKUNJ OTERO   TECHNIQUE: Limited ultrasound of the mid abdomen was performed.   Static images were obtained for remote interpretation.   FINDINGS: Limited imaging in the area of patient's palpable abnormality demonstrates a ventral hernia containing fat and probable segment of bowel. The neck of the hernia sac measures 1.2 cm. There is fluid within the hernia sac.       Limited imaging in the area of patient's palpable abnormality demonstrates a ventral hernia containing fat and segment of bowel. The neck of the hernia sac measures 1.2 cm. There is fluid within the hernia sac. Further evaluation with dedicated CT is recommended to assess for developing strangulation. Correlate with exam to assess for reducibility.   MACRO: Herberth Herrera discussed the significance and urgency of this critical finding by telephone with  TOSADIA PODL on 4/8/2025 at 6:49  pm. (**-RCF-**) Findings:  See findings.   Signed by: Herberth Herrera 4/8/2025 6:59 PM Dictation workstation:   [id number]    Assessment/Plan   Assessment & Plan  Incarcerated umbilical hernia  - reduced by Dr Kemp in the ED  - seen by surgery. Surgery planned in the am  - morphine for pain  - zofran for N/V  - IVF  - NPO  - pt with cardiac history; no CP currently, or signs of heart failure. Cardiology consult for pre-surgical evaluation.     SBO  - transition point in RLQ  - CT obtained prior to reduction of umbilical hernia, therefore uncertain if pt truly has SBO  - General surgery consult  - pt without emesis currently however, if starts vomiting, NG to be placed.     CAD s/p stents  - No CP  - continue ASA  - Hold Plavix  - cont statin and BB    HTN  - hold losartan   - continue metoprolol    Gastritis   - seen on CT  - Pantoprazole IV BID    DM2  - ISS, blood glu check every 4 hrs             I spent 65 minutes in the professional and overall care of this patient.      Janet Cuba MD

## 2025-04-09 NOTE — TREATMENT PLAN
Discussed risks and benefits of surgery with patient at length. Surgery was recommending fixing his hernia while he was inpatient. However he cabrales not want to proceed with surgery this admission and would rather have it scheduled as an outpatient with Dr. Wilkerson to come back in early may.    He is aware of the risks of delaying surgery and was informed of sign of when to return to the ED. Okay for diet today and if he tolerates can be discharged if medically cleared. Discussed with Dr. Wilkerson.

## 2025-04-09 NOTE — NURSING NOTE

## 2025-04-09 NOTE — ED TRIAGE NOTES
PT here from home as requested by PCP due to ventral hernia containing fat and probable segment of bowel. US was completed today. PT is DM 2, HX of MI, GERD, and HTN.

## 2025-04-09 NOTE — CONSULTS
"Patient is a 65-year-old gentleman with multiple multiple medical comorbid conditions to include CAD status post MI in the past and placement cardiac stents on Plavix.    Over the roughly 10 days ago he had some refractory diarrhea.  Several days ago he took some Imodium and this stopped the diarrhea but now its become obstipated.  Last bowel movement several days ago.    He did have a coughing spell and with this he developed a tender bulge around the umbilicus.  He was instructed to come to the emergency department where he was found to have incarcerated umbilical hernia.  Fortunately this was reduced last night in the ER.  He feels much better at this time.         Patient History  Medical History        Past Medical History:   Diagnosis Date    Cardiovascular disease      Myocardial infarction (Multi)      Type 2 diabetes mellitus         obesity    Surgical History         Past Surgical History:   Procedure Laterality Date    OTHER SURGICAL HISTORY   10/14/2022     Colonoscopy - [facility] - polyp f/u 2027    OTHER SURGICAL HISTORY   10/14/2022     Esophagogastroduodenoscopy - [facility]      Bilateral inguinal hernia repair       Exam  /72 (BP Location: Right arm)   Pulse 86   Temp 36.7 °C (98 °F) (Oral)   Resp 18   Ht 1.854 m (6' 1\")   Wt 90.7 kg (200 lb)   SpO2 95%   BMI 26.39 kg/m²    Well-nourished, well-developed. In no distress  Alert and oriented x 3   Cardiac exam not tachycardic   Abdomen is soft nontender nondistended. Renea umbilical tenderness.  Mild blanching erythema.  Appears that the hernia has been reduced  Neurologic exam is without focal deficit.       === 04/08/25 ===    CT ABDOMEN PELVIS W IV CONTRAST    Addendum 4/9/2025 12:33 AM -------------------------------------------------  This addendum is done for critical communication:  STUDY:  CT Abdomen and Pelvis with IV Contrast; 4/8/2025 at 10:40 PM.  INDICATION:  Abdominal pain, distention.  COMPARISON:  CT AP 7/18/2023.  ACCESSION " NUMBER(S):  [id number]  ORDERING CLINICIAN:  ESTEVAN COOK  TECHNIQUE:  CT of the abdomen and pelvis was performed.  Contiguous axial images  were obtained at 3 mm slice thickness through the abdomen and pelvis.  Coronal and sagittal reconstructions at 3 mm slice thickness were  performed.  Omnipaque 350 75 mL was administered intravenously.  FINDINGS:  LOWER CHEST:  No cardiomegaly.  No pericardial effusion.  Bibasal atelectatic bands.    ABDOMEN:    LIVER:  No hepatomegaly.  Smooth surface contour.  Normal attenuation.  Punctate hypodensity within the liver likely due to small cysts.    BILE DUCTS:  No intrahepatic or extrahepatic biliary ductal dilatation.    GALLBLADDER:  The gallbladder is present and shows small calcific cholelithiasis  without evidence of wall thickening or pericholecystic fluid  collection..  STOMACH:  Gastric wall thickening measure up to 1.64 cm.  Findings might suggest  gastritis.  Clinical correlation may be helpful  PANCREAS:  No masses or ductal dilatation.    SPLEEN:  No splenomegaly or focal splenic lesion.  Small splenule adjacent to  the splenic hilum.    ADRENAL GLANDS:  No thickening or nodules.    KIDNEYS AND URETERS:  Kidneys are normal in size and location.  No renal or ureteral  calculi.    PELVIS:    BLADDER:  Overdistended urinary bladder    REPRODUCTIVE ORGANS:  Prostatomegaly    BOWEL:  Fluid-filled dilated small bowel down to the level of right lower  quadrant which suggest small bowel intestinal obstruction.  Diffuse diverticulosis without diverticulitis.  VESSELS:  No abnormalities identified.  Abdominal aorta is normal in caliber.    PERITONEUM/RETROPERITONEUM/LYMPH NODES:  No free fluid.  No pneumoperitoneum.  No lymphadenopathy.    ABDOMINAL WALL:  Umbilical hernia measuring 3 x 2.6 cm increased since the prior.  There is stranding of fatty plane around to the umbilical hernia  indicate inflammation.  SOFT TISSUES:  No abnormalities identified.    BONES:  No  acute fracture or aggressive osseous lesion.  Multilevel  degenerative changes of visualized spine    - Impression -  Small bowel obstruction with transition zone in the right lower  quadrant.  Diffuse diverticulosis without diverticulitis.  Gastric wall thickening which suggest gastritis.  The gallbladder is present and shows small calcific cholelithiasis  without evidence of wall thickening or pericholecystic fluid  collection.. No biliary tree dilatation.  Interval increase in the size of umbilicus hernia.  There is stranding  of fatty plane around to the umbilical hernia indicate inflammation.  Prostatomegaly over distended gallbladder  Signed by Pedro Cameron MD  Lab Results   Component Value Date    WBC 8.8 04/09/2025    HGB 16.9 04/09/2025    HCT 48.8 04/09/2025    MCV 86 04/09/2025     04/09/2025     Lab Results   Component Value Date    GLUCOSE 101 (H) 04/09/2025     04/09/2025    K 4.0 04/09/2025     04/09/2025    CO2 28 04/09/2025    ANIONGAP 9 (L) 04/09/2025    BUN 15 04/09/2025    CREATININE 0.99 04/09/2025    EGFR 84 04/09/2025    CALCIUM 9.0 04/09/2025    ALBUMIN 4.1 04/08/2025    ALKPHOS 94 04/08/2025    PROT 6.7 04/08/2025    AST 18 04/08/2025    BILITOT 1.0 04/08/2025    ALT 19 04/08/2025       Impression; umbilical hernia incarcerated loops of small bowel causing obstruction.  This was reduced in the ER.    I strongly recommended umbilical hernia repair.  We discussed the procedure at length to include risk, benefits and alternatives.    Awaiting cardiac evaluation

## 2025-04-10 ENCOUNTER — OFFICE VISIT (OUTPATIENT)
Dept: GASTROENTEROLOGY | Facility: CLINIC | Age: 65
End: 2025-04-10
Payer: MEDICARE

## 2025-04-10 DIAGNOSIS — K42.0 INCARCERATED UMBILICAL HERNIA: Primary | ICD-10-CM

## 2025-04-10 DIAGNOSIS — R19.7 DIARRHEA, UNSPECIFIED TYPE: ICD-10-CM

## 2025-04-10 PROCEDURE — 3044F HG A1C LEVEL LT 7.0%: CPT | Performed by: INTERNAL MEDICINE

## 2025-04-10 PROCEDURE — 1159F MED LIST DOCD IN RCRD: CPT | Performed by: INTERNAL MEDICINE

## 2025-04-10 PROCEDURE — 4010F ACE/ARB THERAPY RXD/TAKEN: CPT | Performed by: INTERNAL MEDICINE

## 2025-04-10 PROCEDURE — 1160F RVW MEDS BY RX/DR IN RCRD: CPT | Performed by: INTERNAL MEDICINE

## 2025-04-10 PROCEDURE — 1111F DSCHRG MED/CURRENT MED MERGE: CPT | Performed by: INTERNAL MEDICINE

## 2025-04-10 PROCEDURE — 99214 OFFICE O/P EST MOD 30 MIN: CPT | Performed by: INTERNAL MEDICINE

## 2025-04-10 NOTE — LETTER
April 10, 2025     Noé Ralph MD  [address] [facility]  [facility] [address]    Patient: Harpal Covarrubias   YOB: 1960   Date of Visit: 4/10/2025       Dear Dr. Noé Ralph MD:    Thank you for referring Harpal Covarrubias to me for evaluation. Below are my notes for this consultation.  If you have questions, please do not hesitate to call me. I look forward to following your patient along with you.       Sincerely,     Reginald Gregg MD      CC: No Recipients  ______________________________________________________________________________________    Subjective    History of Present Illness:   Harpal Covarrubias is a 65 y.o. male who presents to GI clinic for follow-up of GI symptoms after recent umbilical hernia reduced in ED.    3/30  nausea, explosive diarrhea, lasted couple days. Diarrhea resolved for a day then recurred. Also bloated, belching.    Sx continued and ultimately worsened and was evaluated and found to have an incarcerated umbilical hernia and small bowel obstruction. Ultimately reduced (he recalls with considerable force and effort) in the hospital and his symptoms resolved then..    Today had normal BM initially and then later loose stool.  Feels okay.  Has not been nauseated since the first couple days.    Had several days of nausea and diarrhea several months ago which were self-limited and then spontaneously resolved.  Otherwise, generally free of GI symptoms with reflux under good control with his current regimen.      Review of Systems  Review of Systems    Social History   reports that he has never smoked. He has never been exposed to tobacco smoke. He has never used smokeless tobacco. He reports that he does not currently use alcohol. He reports that he does not currently use drugs.     Allergies  Allergies   Allergen Reactions   • Nitroglycerin Other and Anaphylaxis     Hypotension, Passing out       Medications  Current Outpatient  "Medications   Medication Instructions   • aspirin 81 mg EC tablet 1 tablet, oral, Daily   • Bacillus coagulans (Digestive Advantage Prob Gummy) 250 million cell tablet,chewable oral   • blood sugar diagnostic (OneTouch Verio test strips) strip Use to test sugars twice a day   • candesartan (ATACAND) 8 mg, oral, Daily   • cholecalciferol (VITAMIN D3) 5,000 Units, oral, Daily   • clopidogrel (PLAVIX) 75 mg, oral, Daily   • FreeStyle Ashley 3 Sensor device    • insulin glargine (TOUJEO MAX U-300 SOLOSTAR) 36 Units, subcutaneous, Nightly   • Jardiance 25 mg, oral, Daily   • lactobacillus (Culturelle) 10 billion cell capsule 1 capsule, oral, Daily   • lansoprazole (Prevacid) 30 mg DR capsule TAKE 1 CAPSULE 1/2 HOUR BEFORE BREAKFAST AND DINNER   • metoclopramide (REGLAN) 10 mg, oral, Every 8 hours PRN   • metoprolol succinate XL (TOPROL-XL) 12.5 mg, oral, Daily   • Mounjaro 5 mg, subcutaneous, Every 7 days, Sunday   • pen needle, diabetic 31 gauge x 5/16\" needle miscellaneous, 4 times daily   • rosuvastatin (CRESTOR) 40 mg, oral, Daily        Objective  There were no vitals taken for this visit.   Physical Exam  Constitutional:       Appearance: Normal appearance.   HENT:      Mouth/Throat:      Mouth: Mucous membranes are moist.      Pharynx: Oropharynx is clear.   Eyes:      Extraocular Movements: Extraocular movements intact.      Pupils: Pupils are equal, round, and reactive to light.   Cardiovascular:      Rate and Rhythm: Normal rate and regular rhythm.      Heart sounds: No murmur heard.  Pulmonary:      Effort: Pulmonary effort is normal.      Breath sounds: Normal breath sounds.   Abdominal:      General: Abdomen is flat. Bowel sounds are normal.      Palpations: Abdomen is soft. There is no mass.      Hernia: A hernia (Small supraumbilical easily reducible hernia -- nontender) is present.   Skin:     General: Skin is warm and dry.   Neurological:      Mental Status: He is alert.   Psychiatric:         Mood and " Affect: Mood normal.         Behavior: Behavior normal.         Thought Content: Thought content normal.         Judgment: Judgment normal.           Results from last 7 days   Lab Units 04/09/25 0638 04/08/25 2128 04/07/25  1314   WBC AUTO x10*3/uL 8.8 10.6  --    [facility] WBC AUTO Thousand/uL  --   --  9.5   HEMOGLOBIN g/dL 16.9 18.6*  --    [facility] HEMOGLOBIN g/dL  --   --  18.4*   HEMATOCRIT % 48.8 55.3*  --    [facility] HEMATOCRIT %  --   --  54.9*   PLATELETS AUTO x10*3/uL 202 226  --    [facility] PLATELETS AUTO Thousand/uL  --   --  233     Results from last 7 days   Lab Units 04/09/25 0638 04/08/25 2128 04/07/25  1314   [facility] SODIUM mmol/L  --   --  139   SODIUM mmol/L 139 140  --    [facility] POTASSIUM mmol/L  --   --  5.1   POTASSIUM mmol/L 4.0 3.8  --    [facility] CHLORIDE mmol/L  --   --  102   CHLORIDE mmol/L 106 106  --    [facility] CO2 mmol/L  --   --  25   CO2 mmol/L 28 23  --    BUN mg/dL 15 16  --    [facility] BUN mg/dL  --   --  22   CREATININE mg/dL 0.99 1.05  --    [facility] CREATININE mg/dL  --   --  1.14   [facility] CALCIUM mg/dL  --   --  9.5   CALCIUM mg/dL 9.0 9.7  --    [facility] PROTEIN TOTAL g/dL  --   --  6.9   PROTEIN TOTAL g/dL  --  6.7  --    BILIRUBIN TOTAL mg/dL  --  1.0  --    [facility] BILIRUBIN TOTAL mg/dL  --   --  0.9   [facility] ALK PHOS U/L  --   --  94   ALK PHOS U/L  --  94  --    [facility] ALT U/L  --   --  20   ALT U/L  --  19  --    [facility] AST U/L  --   --  18   AST U/L  --  18  --    [facility] GLUCOSE mg/dL  --   --  104   GLUCOSE mg/dL 101* 124*  --      Results from last 7 days   Lab Units 04/07/25  1314   [facility] CRP mg/L 6.5       Assessment/Plan  Harpal Covarrubias is a 65 y.o. male who presents to GI clinic for complicated history of illness that began with nausea and diarrhea and then ultimately diagnosed with small bowel obstruction related to incarcerated supraumbilical hernia.  Unclear if there is a separate process that led to the diarrhea and nausea or if he was transiently obstructing, having impaired GI motility  (e.g. causing bacterial overgrowth, interruptions in motility) causing the preceding symptoms or whether that was a separate process.  Appears more likely from a standpoint of parsimony that these phenomenon were related.    Recommend    Strongly advised him to proceed with surgical repair of the hernia -- the recent episode of incarceration and small bowel obstruction could be a harbinger of a dangerous acute event in the event of recurrence.  He understands and will go about that.    Told him to follow-up with me if he has further GI symptoms following that correction.  Unlikely but possible that there is a separate process involving a cause for the diarrhea that will need to be further investigated.      Reginald Gregg MD

## 2025-04-12 DIAGNOSIS — N18.2 CKD STAGE 2 DUE TO TYPE 2 DIABETES MELLITUS (MULTI): ICD-10-CM

## 2025-04-12 DIAGNOSIS — E11.22 CKD STAGE 2 DUE TO TYPE 2 DIABETES MELLITUS (MULTI): ICD-10-CM

## 2025-04-12 DIAGNOSIS — I10 ESSENTIAL HYPERTENSION: ICD-10-CM

## 2025-04-12 NOTE — PROGRESS NOTES
Requested Prescriptions     Signed Prescriptions Disp Refills    empagliflozin (Jardiance) 25 mg tablet 90 tablet 3     Sig: Take 1 tablet (25 mg) by mouth once daily.

## 2025-04-21 ENCOUNTER — OFFICE VISIT (OUTPATIENT)
Dept: SURGERY | Facility: CLINIC | Age: 65
End: 2025-04-21
Payer: MEDICARE

## 2025-04-21 VITALS
HEIGHT: 73 IN | HEART RATE: 75 BPM | SYSTOLIC BLOOD PRESSURE: 145 MMHG | WEIGHT: 211.6 LBS | OXYGEN SATURATION: 95 % | BODY MASS INDEX: 28.04 KG/M2 | DIASTOLIC BLOOD PRESSURE: 80 MMHG

## 2025-04-21 DIAGNOSIS — K42.0 INCARCERATED UMBILICAL HERNIA: Primary | ICD-10-CM

## 2025-04-21 PROCEDURE — 1036F TOBACCO NON-USER: CPT | Performed by: SURGERY

## 2025-04-21 PROCEDURE — 3077F SYST BP >= 140 MM HG: CPT | Performed by: SURGERY

## 2025-04-21 PROCEDURE — 1111F DSCHRG MED/CURRENT MED MERGE: CPT | Performed by: SURGERY

## 2025-04-21 PROCEDURE — 99213 OFFICE O/P EST LOW 20 MIN: CPT | Performed by: SURGERY

## 2025-04-21 PROCEDURE — 3008F BODY MASS INDEX DOCD: CPT | Performed by: SURGERY

## 2025-04-21 PROCEDURE — 1126F AMNT PAIN NOTED NONE PRSNT: CPT | Performed by: SURGERY

## 2025-04-21 PROCEDURE — 4010F ACE/ARB THERAPY RXD/TAKEN: CPT | Performed by: SURGERY

## 2025-04-21 PROCEDURE — 1159F MED LIST DOCD IN RCRD: CPT | Performed by: SURGERY

## 2025-04-21 PROCEDURE — 3079F DIAST BP 80-89 MM HG: CPT | Performed by: SURGERY

## 2025-04-21 PROCEDURE — 3044F HG A1C LEVEL LT 7.0%: CPT | Performed by: SURGERY

## 2025-04-21 RX ORDER — CEFAZOLIN SODIUM 2 G/100ML
2 INJECTION, SOLUTION INTRAVENOUS ONCE
OUTPATIENT
Start: 2025-04-21 | End: 2025-04-21

## 2025-04-21 ASSESSMENT — PAIN SCALES - GENERAL: PAINLEVEL_OUTOF10: 0-NO PAIN

## 2025-04-21 NOTE — LETTER
April 21, 2025     Noé Ralph MD  [address] [facility]  [facility] [address]    Patient: Harpal Covarrubias   YOB: 1960   Date of Visit: 4/21/2025       Dear Dr. Noé Ralph MD:    Thank you for referring Harpal Covarrubias to me for evaluation. Below are my notes for this consultation.  If you have questions, please do not hesitate to call me. I look forward to following your patient along with you.       Sincerely,     Fernando Cohen MD      CC: No Recipients  ______________________________________________________________________________________    History Of Present Illness  Harpal Covarrubias is a 65 y.o. male presenting with follow-up after recent hospitalization for an incarcerated umbilical hernia.  The hernia was reduced and he opted to follow-up in the clinic rather than being scheduled.  He has a history of hypertension, hyperlipidemia, diabetes and coronary artery disease.  Couple years ago he had a coronary stent placed.  He is on a baby aspirin and Plavix.  Prior bilateral inguinal hernia repair as a child.  Very busy man.  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  He sees Jigar Henry from cardiology.     Past Medical History  Medical History[1]    Surgical History  Surgical History[2]     Social History  He reports that he has never smoked. He has never been exposed to tobacco smoke. He has never used smokeless tobacco. He reports that he does not currently use alcohol. He reports that he does not currently use drugs.    Family History  Family History[3]     Allergies  Nitroglycerin    Review of Systems  Constitutional: no weight loss, no fevers, no malaise  HEENT: negative  Neck: negative  Pulmonary: no SOB, no cough  CV: no chest pain, otherwise negative  GI: no pain, no diarrhea, no bloody stools, no constipation  : no hematuria, retention.  MS: no aches/pains  Neurologic: negative  Skin: no rashes, lesions  HEME: no bleeding  "tendency, no bruising  Psych: no mood issues    Physical Exam  General: well appearing, no acute distress, well nourished  HEENT: normal  Neck: supple  Pulmonary: lungs clear to auscultation bilaterally  CV: RR, S1S2, no murmurs.  Pulses palpable and equal.  Good capillary refill  Abdomen: soft, Reducible moderate-sizedender umbilical hernia  : grossly normal external genitalia  MS: grossly normal  Neurologic: alert and oriented, strength/sensation intact  Skin: non jaundiced, no lesions  Psych: mood appropriate    Last Recorded Vitals  Blood pressure 145/80, pulse 75, height 1.854 m (6' 1\"), weight 96 kg (211 lb 9.6 oz), SpO2 95%.    Relevant Results      CT scan reviewed with him         Impression:  Umbilical Hernia    -I carefully reviewed pathophysiology of umbilical hernias with patient  -Risks of eventual incarceration/strangulation, worsening symptomatology described  -Rationale for surgical repair outlined  -Techniques of repair described (laparoscopy vs open)  -Risks of surgery addressed (bleeding, infection, bladder/bowel injury, chronic pain syndromes, recurrence, etc)  -Expected recovery timeline conveyed (2-4 weeks of limited activity, work restrictions, need for pain medications, etc)  -Other option would be watchful waiting, avoidance of activity that worsens symptoms  -Patient has indicated to me a verbal understanding of all this information and would like to proceed with MIS robotic/laparoscopic preperitoneal mesh repair of the hernia.    -Office will schedule at patient convenience    I spent 45 minutes in the professional and overall care of this patient.      Fernando Cohen MD         [1]  Past Medical History:  Diagnosis Date   • Cardiovascular disease    • Myocardial infarction (Multi)    • Type 2 diabetes mellitus    [2]  Past Surgical History:  Procedure Laterality Date   • OTHER SURGICAL HISTORY  10/14/2022    Colonoscopy - Gregg - polyp f/u 2027   • OTHER SURGICAL HISTORY  " 10/14/2022    Esophagogastroduodenoscopy - Cristino   [3]  No family history on file.       [1]  Past Medical History:  Diagnosis Date   • Cardiovascular disease    • Myocardial infarction (Multi)    • Type 2 diabetes mellitus    [2]  Past Surgical History:  Procedure Laterality Date   • OTHER SURGICAL HISTORY  10/14/2022    Colonoscopy - Cristino - polyp f/u 2027   • OTHER SURGICAL HISTORY  10/14/2022    Esophagogastroduodenoscopy - Cristino   [3]  No family history on file.

## 2025-04-21 NOTE — PROGRESS NOTES
"History Of Present Illness  Harpal Covarrubias is a 65 y.o. male presenting with follow-up after recent hospitalization for an incarcerated umbilical hernia.  The hernia was reduced and he opted to follow-up in the clinic rather than being scheduled.  He has a history of hypertension, hyperlipidemia, diabetes and coronary artery disease.  Couple years ago he had a coronary stent placed.  He is on a baby aspirin and Plavix.  Prior bilateral inguinal hernia repair as a child.  Very busy man.  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  He sees Jigar Henry from cardiology.     Past Medical History  Medical History[1]    Surgical History  Surgical History[2]     Social History  He reports that he has never smoked. He has never been exposed to tobacco smoke. He has never used smokeless tobacco. He reports that he does not currently use alcohol. He reports that he does not currently use drugs.    Family History  Family History[3]     Allergies  Nitroglycerin    Review of Systems  Constitutional: no weight loss, no fevers, no malaise  HEENT: negative  Neck: negative  Pulmonary: no SOB, no cough  CV: no chest pain, otherwise negative  GI: no pain, no diarrhea, no bloody stools, no constipation  : no hematuria, retention.  MS: no aches/pains  Neurologic: negative  Skin: no rashes, lesions  HEME: no bleeding tendency, no bruising  Psych: no mood issues    Physical Exam  General: well appearing, no acute distress, well nourished  HEENT: normal  Neck: supple  Pulmonary: lungs clear to auscultation bilaterally  CV: RR, S1S2, no murmurs.  Pulses palpable and equal.  Good capillary refill  Abdomen: soft, Reducible moderate-sizedender umbilical hernia  : grossly normal external genitalia  MS: grossly normal  Neurologic: alert and oriented, strength/sensation intact  Skin: non jaundiced, no lesions  Psych: mood appropriate    Last Recorded Vitals  Blood pressure 145/80, pulse 75, height 1.854 m (6' 1\"), weight 96 kg " (211 lb 9.6 oz), SpO2 95%.    Relevant Results      CT scan reviewed with him         Impression:  Umbilical Hernia    -I carefully reviewed pathophysiology of umbilical hernias with patient  -Risks of eventual incarceration/strangulation, worsening symptomatology described  -Rationale for surgical repair outlined  -Techniques of repair described (laparoscopy vs open)  -Risks of surgery addressed (bleeding, infection, bladder/bowel injury, chronic pain syndromes, recurrence, etc)  -Expected recovery timeline conveyed (2-4 weeks of limited activity, work restrictions, need for pain medications, etc)  -Other option would be watchful waiting, avoidance of activity that worsens symptoms  -Patient has indicated to me a verbal understanding of all this information and would like to proceed with MIS robotic/laparoscopic preperitoneal mesh repair of the hernia.    -Office will schedule at patient convenience    I spent 45 minutes in the professional and overall care of this patient.      Fernando Cohen MD         [1]   Past Medical History:  Diagnosis Date    Cardiovascular disease     Myocardial infarction (Multi)     Type 2 diabetes mellitus    [2]   Past Surgical History:  Procedure Laterality Date    OTHER SURGICAL HISTORY  10/14/2022    Colonoscopy - Cristino - polyp f/u 2027    OTHER SURGICAL HISTORY  10/14/2022    Esophagogastroduodenoscopy - Cristino   [3] No family history on file.

## 2025-04-22 ENCOUNTER — TELEPHONE (OUTPATIENT)
Dept: PREADMISSION TESTING | Facility: HOSPITAL | Age: 65
End: 2025-04-22
Payer: MEDICARE

## 2025-04-22 LAB
ATRIAL RATE: 68 BPM
P AXIS: 32 DEGREES
P OFFSET: 123 MS
P ONSET: 77 MS
PR INTERVAL: 292 MS
Q ONSET: 223 MS
QRS COUNT: 11 BEATS
QRS DURATION: 102 MS
QT INTERVAL: 388 MS
QTC CALCULATION(BAZETT): 412 MS
QTC FREDERICIA: 404 MS
R AXIS: -36 DEGREES
T AXIS: 12 DEGREES
T OFFSET: 417 MS
VENTRICULAR RATE: 68 BPM

## 2025-04-23 ENCOUNTER — TELEPHONE (OUTPATIENT)
Dept: PREADMISSION TESTING | Facility: HOSPITAL | Age: 65
End: 2025-04-23
Payer: MEDICARE

## 2025-04-23 NOTE — TELEPHONE ENCOUNTER
Per email surgeon office left voicemail to call Destini VALERIO to schedule.     Pre-admission called and was unable to leave voicemail and no answer.  My chart message sent requesting patient call [phone] to schedule appointments.    JESSICA Carreno

## 2025-04-24 ENCOUNTER — PATIENT OUTREACH (OUTPATIENT)
Dept: CARE COORDINATION | Facility: CLINIC | Age: 65
End: 2025-04-24
Payer: MEDICARE

## 2025-04-24 ENCOUNTER — TELEPHONE (OUTPATIENT)
Dept: PREADMISSION TESTING | Facility: HOSPITAL | Age: 65
End: 2025-04-24
Payer: MEDICARE

## 2025-04-24 ENCOUNTER — TELEPHONE (OUTPATIENT)
Dept: SURGERY | Facility: CLINIC | Age: 65
End: 2025-04-24
Payer: MEDICARE

## 2025-04-24 NOTE — TELEPHONE ENCOUNTER
Spoke with patient and informed him to contact pre-admission to schedule appointment prior to surgery date. Patient states that he has returned calls but get voicemail. I will try to schedule appointment for patient and call him back with date and time. Patient agreed.

## 2025-04-24 NOTE — TELEPHONE ENCOUNTER
**4/24-Called patient, no answer, unable to LVM (not set up) & emailed surgeon. Pt did call and LVM on our phone.  Did not answer when called back--CDS

## 2025-04-25 ENCOUNTER — TELEPHONE (OUTPATIENT)
Dept: PREADMISSION TESTING | Facility: HOSPITAL | Age: 65
End: 2025-04-25
Payer: MEDICARE

## 2025-04-25 ENCOUNTER — CLINICAL SUPPORT (OUTPATIENT)
Dept: PREADMISSION TESTING | Facility: HOSPITAL | Age: 65
End: 2025-04-25
Payer: MEDICARE

## 2025-04-25 DIAGNOSIS — K42.0 INCARCERATED UMBILICAL HERNIA: ICD-10-CM

## 2025-04-25 RX ORDER — METHYLCELLULOSE 500 MG/1
1 TABLET ORAL DAILY
COMMUNITY

## 2025-04-25 NOTE — CPM/PAT NURSE NOTE
CPM/PAT Nurse Note      Name: Harpal Covarrubias (Harpal Covarrubias)  /Age: 1960/65 y.o.       Medical History[1]    Surgical History[2]    Patient Sexual activity questions deferred to the physician.    Family History[3]    Allergies[4]    Prior to Admission medications    Medication Sig Start Date End Date Taking? Authorizing Provider   aspirin 81 mg EC tablet Take 1 tablet (81 mg) by mouth once daily. 22   Historical Provider, MD   Bacillus coagulans (Digestive Advantage Prob Gummy) 250 million cell tablet,chewable Chew 1 tablet 2 times a day.    Historical Provider, MD   blood sugar diagnostic (OneTouch Verio test strips) strip Use to test sugars twice a day 19   Historical Provider, MD   candesartan (Atacand) 8 mg tablet Take 1 tablet (8 mg) by mouth once daily. 24   Noé Ralph MD   cholecalciferol (Vitamin D3) 5,000 Units tablet Take 1 tablet (5,000 Units) by mouth once daily.    Historical Provider, MD   clopidogrel (Plavix) 75 mg tablet Take 1 tablet (75 mg) by mouth once daily. 10/21/24 10/21/25  Balta Henry MD   empagliflozin (Jardiance) 25 mg tablet Take 1 tablet (25 mg) by mouth once daily. 25   Noé Ralph MD   FreeStyle Ashley 3 Sensor device  7/3/23   Historical Provider, MD   insulin glargine (Toujeo Max U-300 SoloStar) 300 unit/mL (3 mL) injection Inject 36 Units under the skin once daily at bedtime. 25   Noé Ralph MD   lactobacillus (Culturelle) 10 billion cell capsule Take 1 capsule by mouth once daily.    Historical Provider, MD   lansoprazole (Prevacid) 30 mg DR capsule TAKE 1 CAPSULE 1/2 HOUR BEFORE BREAKFAST AND DINNER  Patient taking differently: once daily. 23   Reginald Gregg MD   methylcellulose, laxative, (CitruceL) 500 mg tablet Take 1 tablet (500 mg) by mouth once daily.    Historical Provider, MD   metoclopramide (Reglan) 10 mg tablet Take 1 tablet (10 mg) by mouth every 8 hours if needed (for nausea) for up to 7 days.  Patient not taking:  "Reported on 4/25/2025 12/13/24 1/14/25  Terri Liang MD   metoprolol succinate XL (Toprol-XL) 25 mg 24 hr tablet Take 0.5 tablets (12.5 mg) by mouth once daily. 10/20/24   MD Anika Funez 5 mg/0.5 mL pen injector Inject 5 mg under the skin every 7 days. Sanchez 3/22/25   Historical Provider, MD   pen needle, diabetic 31 gauge x 5/16\" needle 4 times a day.    Historical Provider, MD   rosuvastatin (Crestor) 40 mg tablet Take 1 tablet (40 mg) by mouth once daily. 3/22/25   Noé Ralph MD        PAT ROS     DASI Risk Score    No data to display       Caprini DVT Assessment      Flowsheet Row ED to Hosp-Admission (Discharged) from 4/8/2025 in [facility] Bldg A 5 with Jay Hodgson MD and Reginald Kemp MD   DVT Score (IF A SCORE IS NOT CALCULATING, MUST SELECT A BMI TO COMPLETE) 3 filed at 04/09/2025 0143   BMI (BMI MUST BE CHOSEN) 30 or less filed at 04/09/2025 0143          Modified Frailty Index    No data to display       [id number] Stroke Risk Points  Current as of just now        N/A 0 to 9 Points:      Last Change: N/A          The QBQ9VQ1-[id number] risk score (Lip NIKKI, et al. 2009. © 2010 American College [employer]) quantifies the risk of stroke for a patient with atrial fibrillation. For patients without atrial fibrillation or under the age of 18 this score appears as N/A. Higher score values generally indicate higher risk of stroke.        This score is not applicable to this patient. Components are not calculated.          Revised Cardiac Risk Index    No data to display       Apfel Simplified Score    No data to display       Risk Analysis Index Results This Encounter    No data found in the last 10 encounters.       Prodigy: High Risk  Total Score: 16              Prodigy Age Score      Prodigy Gender Score          ARISCAT Score for Postoperative Pulmonary Complications    No data to display       Chaudhari Perioperative Risk for Myocardial Infarction or Cardiac Arrest " (DORETHA)    No data to display         Nurse Plan of Action:         RN screening call complete.  Reviewed allergies, medications and pharmacy, medical, surgical and social history with patient.  Chart updated.  Instructed patient to stop plavix, last  dose 4/29/25  prior to surgery.         [1]   Past Medical History:  Diagnosis Date    Atrial fibrillation (Multi)     s/p ablation    Browning esophagus     Chronic kidney disease Long Term    4/7/25 BUN 22, Creatinie 1.14, GFR 71    Colon polyp     Tubular Adenoma 10/2022 - repeat 2027 - Cristino    Coronary artery disease     CAD with NSTEMI s/p LCx PCI 11/2022    Diverticulitis of colon     Dysphagia     Elevated coronary artery calcium score     7/7/2022 coronary artery calcium score 1916    GERD (gastroesophageal reflux disease) Long Term    History of echocardiogram 04/09/2025    1. Left ventricular ejection fraction is normal, calculated by Purdy's biplane at 66%  2.Spectral Doppler shows a Grade I (impaired relaxation pattern) of left ventricular diastolic filling with normal left atrial filling pressure  3 There is normal right ventricular global systolic function 4 The left atrium is mildly dilated 5. Right ventricular systolic pressure is within normal limits.    History of kidney stones     Hyperlipidemia     Hypertension     Incarcerated umbilical hernia     plan :Robot Assisted Umbilical Hernia Repair; Mesh Placement 5/5    Myocardial infarction (Multi) 11/28/2022    NSTEMI s/p PCI    Sleep apnea     does not use CPAP- improved with weight loss.    Type 2 diabetes mellitus     4/8/25 A1c 6.7%   [2]   Past Surgical History:  Procedure Laterality Date    ABLATION OF DYSRHYTHMIC FOCUS      CARDIAC CATHETERIZATION  11/28/22    CORONARY STENT PLACEMENT  11/28/22    HERNIA REPAIR  6/77    OTHER SURGICAL HISTORY  10/14/2022    Colonoscopy - Cristino - polyp f/u 2027    OTHER SURGICAL HISTORY  10/14/2022    Esophagogastroduodenoscopy - Cristino   [3]   Family  History  Problem Relation Name Age of Onset    Colon polyps Mother Virginie Covarrubias     Diabetes Maternal Grandmother Anurag     Stroke Maternal Grandfather Adarsh     Hernia Paternal Grandfather Adarsh    [4]   Allergies  Allergen Reactions    Nitroglycerin Other and Anaphylaxis     Hypotension, Passing out

## 2025-04-28 ENCOUNTER — PATIENT OUTREACH (OUTPATIENT)
Dept: CARE COORDINATION | Facility: CLINIC | Age: 65
End: 2025-04-28
Payer: MEDICARE

## 2025-04-28 ENCOUNTER — TELEPHONE (OUTPATIENT)
Dept: PRIMARY CARE | Facility: CLINIC | Age: 65
End: 2025-04-28
Payer: MEDICARE

## 2025-04-28 NOTE — TELEPHONE ENCOUNTER
1)  Patient woke up feeling very ill again.  Wants to discuss with you and make sure there is nothing really for him to do at this point?    2)  Patient wants to make sure you're aware he is scheduled for surgery in 2 weeks to resolve this issue.     Please call to discuss ASAP.

## 2025-04-28 NOTE — TELEPHONE ENCOUNTER
Called back and discussed   Started up this AM - took some Zofran and that seemed to quiet things - plans to try a small amount of Ginger Ale / Water / Banana - has stopped Monjauro - will continue to follow - try to avoid use of Reglan - would continue slowly getting back to some fluids and then nutrition - to update me as need be - has surgery scheduled for next week at [facility]

## 2025-05-01 ENCOUNTER — PRE-ADMISSION TESTING (OUTPATIENT)
Dept: PREADMISSION TESTING | Facility: HOSPITAL | Age: 65
End: 2025-05-01
Payer: MEDICARE

## 2025-05-01 VITALS
RESPIRATION RATE: 16 BRPM | HEIGHT: 73 IN | SYSTOLIC BLOOD PRESSURE: 154 MMHG | DIASTOLIC BLOOD PRESSURE: 77 MMHG | TEMPERATURE: 96.6 F | OXYGEN SATURATION: 97 % | HEART RATE: 76 BPM | WEIGHT: 209.44 LBS | BODY MASS INDEX: 27.76 KG/M2

## 2025-05-01 DIAGNOSIS — I10 ESSENTIAL HYPERTENSION: Primary | ICD-10-CM

## 2025-05-01 PROCEDURE — 99204 OFFICE O/P NEW MOD 45 MIN: CPT | Performed by: NURSE PRACTITIONER

## 2025-05-01 PROCEDURE — 87081 CULTURE SCREEN ONLY: CPT | Mod: AHULAB | Performed by: NURSE PRACTITIONER

## 2025-05-01 RX ORDER — CHLORHEXIDINE GLUCONATE ORAL RINSE 1.2 MG/ML
SOLUTION DENTAL
Qty: 473 ML | Refills: 0 | Status: SHIPPED | OUTPATIENT
Start: 2025-05-01

## 2025-05-01 ASSESSMENT — ENCOUNTER SYMPTOMS
RESPIRATORY NEGATIVE: 1
ENDOCRINE NEGATIVE: 1
ARTHRALGIAS: 1
GASTROINTESTINAL NEGATIVE: 1
NECK NEGATIVE: 1
CARDIOVASCULAR NEGATIVE: 1
NEUROLOGICAL NEGATIVE: 1
BRUISES/BLEEDS EASILY: 1
CONSTITUTIONAL NEGATIVE: 1

## 2025-05-01 NOTE — CPM/PAT NURSE NOTE
CPM/PAT Nurse Note      Name: Harpal Covarrubias (Harpal Covarrubias)  /Age: 1960/65 y.o.       Medical History[1]    Surgical History[2]    Patient Sexual activity questions deferred to the physician.    Family History[3]    Allergies[4]    Prior to Admission medications    Medication Sig Start Date End Date Taking? Authorizing Provider   aspirin 81 mg EC tablet Take 1 tablet (81 mg) by mouth once daily. 22  Yes Historical Provider, MD   Bacillus coagulans (Digestive Advantage Prob Gummy) 250 million cell tablet,chewable Chew 1 tablet 2 times a day.   Yes Historical Provider, MD   candesartan (Atacand) 8 mg tablet Take 1 tablet (8 mg) by mouth once daily. 24  Yes Noé Ralph MD   cholecalciferol (Vitamin D3) 5,000 Units tablet Take 1 tablet (5,000 Units) by mouth once daily.   Yes Historical Provider, MD   clopidogrel (Plavix) 75 mg tablet Take 1 tablet (75 mg) by mouth once daily. 10/21/24 10/21/25 Yes Balta Henry MD   empagliflozin (Jardiance) 25 mg tablet Take 1 tablet (25 mg) by mouth once daily. 25  Yes Noé Ralph MD   insulin glargine (Toujeo Max U-300 SoloStar) 300 unit/mL (3 mL) injection Inject 36 Units under the skin once daily at bedtime. 25  Yes Noé Ralph MD   lactobacillus (Culturelle) 10 billion cell capsule Take 1 capsule by mouth once daily.   Yes Historical Provider, MD   lansoprazole (Prevacid) 30 mg DR capsule TAKE 1 CAPSULE 1/2 HOUR BEFORE BREAKFAST AND DINNER  Patient taking differently: once daily. 23  Yes Reginald Gregg MD   methylcellulose, laxative, (CitruceL) 500 mg tablet Take 1 tablet (500 mg) by mouth once daily.   Yes Historical Provider, MD   metoprolol succinate XL (Toprol-XL) 25 mg 24 hr tablet Take 0.5 tablets (12.5 mg) by mouth once daily. 10/20/24  Yes Noé Ralph MD   rosuvastatin (Crestor) 40 mg tablet Take 1 tablet (40 mg) by mouth once daily. 3/22/25  Yes Noé Ralph MD   blood sugar diagnostic (OneTouch Verio test strips) strip Use to test  "sugars twice a day 12/17/19   Historical Provider, MD   chlorhexidine (Peridex) 0.12 % solution SWISH AND SPIT 15ML FOR 30 SECONDS NIGHT PRIOR TO SURGERY AND MORNING OF SURGERY 5/1/25   Ava Jalloh APRN-CNP   FreeStyle Ashley 3 Sensor device  7/3/23   Historical Provider, MD   metoclopramide (Reglan) 10 mg tablet Take 1 tablet (10 mg) by mouth every 8 hours if needed (for nausea) for up to 7 days.  Patient not taking: Reported on 5/1/2025 12/13/24 1/14/25  Terri Liang MD   pen needle, diabetic 31 gauge x 5/16\" needle 4 times a day.    Historical Provider, MD   Mounjaro 5 mg/0.5 mL pen injector Inject 5 mg under the skin every 7 days. Sanchez 3/22/25 4/28/25  Historical Provider, MD IMAN RUIZ     DASGONZÁLEZ Risk Score    No data to display       Caprini DVT Assessment      Flowsheet Row ED to Hosp-Admission (Discharged) from 4/8/2025 in [facility] Bldg A 5 with Jay Hodgson MD and Reginald Kemp MD   DVT Score (IF A SCORE IS NOT CALCULATING, MUST SELECT A BMI TO COMPLETE) 3 filed at 04/09/2025 0143   BMI (BMI MUST BE CHOSEN) 30 or less filed at 04/09/2025 0143          Modified Frailty Index    No data to display       [id number] Stroke Risk Points  Current as of 4 minutes ago        N/A 0 to 9 Points:      Last Change: N/A          The [id number]-[id number] risk score (Lip GH, et al. 2009. © 2010 [employer]) quantifies the risk of stroke for a patient with atrial fibrillation. For patients without atrial fibrillation or under the age of 18 this score appears as N/A. Higher score values generally indicate higher risk of stroke.        This score is not applicable to this patient. Components are not calculated.          Revised Cardiac Risk Index    No data to display       Apfel Simplified Score    No data to display       Risk Analysis Index Results This Encounter    No data found in the last 10 encounters.       Prodigy: High Risk  Total Score: 16              Prodigy Age " Score      Prodigy Gender Score          ARISCAT Score for Postoperative Pulmonary Complications    No data to display       Chaudhari Perioperative Risk for Myocardial Infarction or Cardiac Arrest (DORETHA)    No data to display         Nurse Plan of Action:     After Visit Summary (AVS) reviewed and patient verbalized good understanding of medications and NPO instructions.  Pre-op infection prevention measures:  CHG showers and mouthwash reviewed, understanding voiced.  CHG soap given and patient verbalized need to pick CHG mouthwash at their preferred local pharmacy.              [1]   Past Medical History:  Diagnosis Date    Atrial fibrillation (Multi)     s/p ablation    Browning esophagus     Chronic kidney disease Long Term    4/7/25 BUN 22, Creatinie 1.14, GFR 71    Colon polyp     Tubular Adenoma 10/2022 - repeat 2027 - Gregg    Coronary artery disease     CAD with NSTEMI s/p LCx PCI 11/2022    Diverticulitis of colon     Dysphagia     Elevated coronary artery calcium score     7/7/2022 coronary artery calcium score 1916    GERD (gastroesophageal reflux disease) Long Term    History of echocardiogram 04/09/2025    1. Left ventricular ejection fraction is normal, calculated by Purdy's biplane at 66%  2.Spectral Doppler shows a Grade I (impaired relaxation pattern) of left ventricular diastolic filling with normal left atrial filling pressure  3 There is normal right ventricular global systolic function 4 The left atrium is mildly dilated 5. Right ventricular systolic pressure is within normal limits.    History of kidney stones     Hyperlipidemia     Hypertension     Incarcerated umbilical hernia     plan :Robot Assisted Umbilical Hernia Repair; Mesh Placement 5/5    Myocardial infarction (Multi) 11/28/2022    NSTEMI s/p PCI    Sleep apnea     does not use CPAP- improved with weight loss.    Type 2 diabetes mellitus     4/8/25 A1c 6.7%   [2]   Past Surgical History:  Procedure Laterality Date    ABLATION OF  DYSRHYTHMIC FOCUS      CARDIAC CATHETERIZATION  11/28/22    CORONARY STENT PLACEMENT  11/28/22    HERNIA REPAIR  6/77    OTHER SURGICAL HISTORY  10/14/2022    Colonoscopy - Cristino - polyp f/u 2027    OTHER SURGICAL HISTORY  10/14/2022    Esophagogastroduodenoscopy - Cristino   [3]   Family History  Problem Relation Name Age of Onset    Colon polyps Mother Virginie Campbellnevin     Diabetes Maternal Grandmother Anurag     Stroke Maternal Grandfather Adarsh     Hernia Paternal Grandfather Adarsh    [4]   Allergies  Allergen Reactions    Nitroglycerin Other and Anaphylaxis     Hypotension, Passing out

## 2025-05-01 NOTE — CPM/PAT H&P
[facility]/PAT Evaluation       Name: Harpal Covarrubias (Harpal Covarrubias)  /Age: 1960/65 y.o.     In-Person         Date of Consult: 25    Referring Provider:  Dr. Cohen    Date, Surgery, and Length:  25, robot assisted umbilical hernia repair with mesh placement, 155 minutes      Patient presents to [facility] for perioperative risk assessment prior to scheduled surgery. Patient presents with history of hospitalization for incarcerated umbilical hernia that able to be reduced. He has had previous surgery for bilateral inguinal hernia repair.      This note was created in part upon personal review of patient's medical records.      Pt denies any past history of anesthetic complications such as PONV, awareness, prolonged sedation, dental damage, aspiration, cardiac arrest, difficult intubation, difficult I.V. access or unexpected hospital admissions. No history of malignant hyperthermia and or pseudocholinesterase deficiency.    No history of blood transfusions.    The patient IS NOT a Yarsani and will accept blood and blood products if medically indicated.     Type and screen NOT sent.      Past Medical History:   Diagnosis Date    Atrial fibrillation (Multi)     s/p ablation    Browning esophagus     Chronic kidney disease Long Term    25 BUN 22, Creatinie 1.14, GFR 71    Colon polyp     Tubular Adenoma 10/2022 - repeat  - Gregg    Coronary artery disease     CAD with NSTEMI s/p LCx PCI 2022    Diverticulitis of colon     Dysphagia     Elevated coronary artery calcium score     2022 coronary artery calcium score 1916    GERD (gastroesophageal reflux disease) Long Term    History of echocardiogram 2025    1. Left ventricular ejection fraction is normal, calculated by Purdy's biplane at 66%  2.Spectral Doppler shows a Grade I (impaired relaxation pattern) of left ventricular diastolic filling with normal left atrial filling pressure  3 There is normal right ventricular global  systolic function 4 The left atrium is mildly dilated 5. Right ventricular systolic pressure is within normal limits.    History of kidney stones     Hyperlipidemia     Hypertension     Incarcerated umbilical hernia     plan :Robot Assisted Umbilical Hernia Repair; Mesh Placement 5/5    Myocardial infarction (Multi) 11/28/2022    NSTEMI s/p PCI    Sleep apnea     does not use CPAP- improved with weight loss.    Type 2 diabetes mellitus     4/8/25 A1c 6.7%       Family History   Problem Relation Name Age of Onset    Colon polyps Mother Virginie Covarrubias     Diabetes Maternal Grandmother Anurag     Stroke Maternal Grandfather Adarsh     Hernia Paternal Grandfather Adarsh      Social History     Tobacco Use   Smoking Status Never    Passive exposure: Never   Smokeless Tobacco Never     Social History     Substance and Sexual Activity   Alcohol Use Not Currently     Social History     Substance and Sexual Activity   Drug Use Not Currently       Allergies   Allergen Reactions    Nitroglycerin Other and Anaphylaxis     Hypotension, Passing out     Current Outpatient Medications   Medication Instructions    aspirin 81 mg EC tablet 1 tablet, Daily    Bacillus coagulans (Digestive Advantage Prob Gummy) 250 million cell tablet,chewable 1 tablet, 2 times daily    blood sugar diagnostic (OneTouch Verio test strips) strip Use to test sugars twice a day    candesartan (ATACAND) 8 mg, oral, Daily    chlorhexidine (Peridex) 0.12 % solution SWISH AND SPIT 15ML FOR 30 SECONDS NIGHT PRIOR TO SURGERY AND MORNING OF SURGERY    cholecalciferol (VITAMIN D3) 5,000 Units, Daily    clopidogrel (PLAVIX) 75 mg, oral, Daily    empagliflozin (JARDIANCE) 25 mg, oral, Daily    FreeStyle Ashley 3 Sensor device     insulin glargine (TOUJEO MAX U-300 SOLOSTAR) 36 Units, subcutaneous, Nightly    lactobacillus (Culturelle) 10 billion cell capsule 1 capsule, Daily    lansoprazole (Prevacid) 30 mg DR capsule TAKE 1 CAPSULE 1/2 HOUR BEFORE BREAKFAST AND DINNER     "methylcellulose, laxative, (CitruceL) 500 mg tablet 1 tablet, Daily    metoclopramide (REGLAN) 10 mg, oral, Every 8 hours PRN    metoprolol succinate XL (TOPROL-XL) 12.5 mg, oral, Daily    pen needle, diabetic 31 gauge x 5/16\" needle 4 times daily    rosuvastatin (CRESTOR) 40 mg, oral, Daily       PAT ROS:   Constitutional:   neg    Neuro/Psych:   neg    Eyes:    use of corrective lenses  Ears:   neg    Nose:   neg    Mouth:   neg    Throat:   neg    Neck:   neg    Cardio:   neg    Respiratory:   neg    Endocrine:   neg    GI:   neg    :   neg    Musculoskeletal:    arthralgias  Hematologic:    bruises/bleeds easily  Skin:  neg        Physical Exam  Vitals reviewed. Physical exam within normal limits.          PAT AIRWAY:   Airway:     Mallampati::  II    TM distance::  >3 FB    Neck ROM::  Full  normal          Visit Vitals  /77   Pulse 76   Temp 35.9 °C (96.6 °F)   Resp 16   Ht 1.85 m (6' 0.84\")   Wt 95 kg (209 lb 7 oz)   SpO2 97%   BMI 27.76 kg/m²   Smoking Status Never   BSA 2.21 m²       Assessment and Plan:       Patient is a 65 year old male scheduled for robot assisted umbilical hernia repair with mesh placement on 5/5/25 with Dr. Cohen.       Plan    Neuro:  No neurologic diagnosis, however, the patient is at increased risk for perioperative delirium secondary to  age, polypharmacy, type and duration of surgery, Patient instructed on and provided cognitive exercises  Patient is at increased risk for perioperative CVA secondary to  CRF, cardiac disease, perioperative interruption of anticoagulant, HTN, DM, increased age      Cardiovascular:    RCRI: 0 Risk of Mace: 0.4%    Caprini:  4 VTE risk: 0.7%    Patient denies any chest pain, tightness, heaviness, pressure, radiating pain, palpitations, irregular heartbeats, lightheadedness, cough, congestion, shortness of breath, MCGUIRE, PND, near syncope, weight loss or gain.    Good functional capacity      EKG in PAT   Encounter Date: 04/08/25   ECG 12 Lead " "  Result Value    Ventricular Rate 68    Atrial Rate 68    CT Interval 292    QRS Duration 102    QT Interval 388    QTC Calculation(Bazett) 412    P Axis 32    R Axis -36    T Axis 12    QRS Count 11    Q Onset 223    P Onset 77    P Offset 123    T Offset 417    QTC Fredericia 404    Narrative    Sinus rhythm with 1st degree AV block  Left axis deviation  Inferior infarct , age undetermined  Abnormal ECG  When compared with ECG of 14-JAN-2025 15:40,  No significant change was found  Confirmed by Tyler Funes ([id number]) on 4/22/2025 8:38:38 AM     CAD- PCI placed 2022, Plavix held 7 days prior to surgery. Patient follows with Dr. Henry    HTN- managed on Candesartan, Metoprolol    HLD- on statin    During hospitalization for incarcerated hernia April 2025, patient seen in-house by cardiologist, Dr. Funes. Per consult note:  \"1. Cardiac risk stratification/preop cardiac risk assessment: Patient currently denies chest pain, shortness of breath, or PND.  Low to Moderate / Acceptable Risks for Cardiovascular Complications.   - Continue ASA 81 mg PO daily Indefinitely (d/t h/o PCI with Cardiac stent)\"      Echo 4/9/25:  CONCLUSIONS:   1. Left ventricular ejection fraction is normal, calculated by Purdy's biplane at 66%.   2. Spectral Doppler shows a Grade I (impaired relaxation pattern) of left ventricular diastolic filling with normal left atrial filling pressure.   3. There is normal right ventricular global systolic function.   4. The left atrium is mildly dilated.   5. Right ventricular systolic pressure is within normal limits.      Pulmonary:  No pulmonary diagnosis, however patient is at increased risk of perioperative complications secondary to  age > 60, site of surgery, duration of surgery > 2 hours, types of anesthetic  Stop Bang KNOWN PRASHANT, IMPROVED WITH WEIGHT LOSS. NO CPAP  ARISCAT: >45 points, 42.1% risk of in-hospital postoperative pulmonary complication  PRODIGY: High risk for opioid induced " respiratory depression      Renal/endo:  Recommendations to avoid nephrotoxic drugs and carefully monitor fluid status to maintain euvolemia. Use dose adjusted medications as needed for the underlying level of renal function.    DM II- will hold Insulin DOS. Jardiace to be held 3 days  prior to surgery    Lab Results   Component Value Date    HGBA1C 6.7 (H) 04/08/2025       Heme:  Patient instructed to ambulate as soon as possible postoperatively to decrease thromboembolic risk.    Initiate mechanical DVT prophylaxis as soon as possible and initiate chemical prophylaxis when deemed safe from a bleeding standpoint post surgery.        Risk assessment complete.  This patient is INTERMEDIATE risk candidate undergoing MODERATE risk procedure, patient is medically optimized for surgery.      Labs/testing obtained in PAT on 5/1/25: Reviewed last CBC, BMP, A1C, EKG. Obtained MRSA    Lab Results   Component Value Date    WBC 8.8 04/09/2025    HGB 16.9 04/09/2025    HCT 48.8 04/09/2025    MCV 86 04/09/2025     04/09/2025     Lab Results   Component Value Date    GLUCOSE 101 (H) 04/09/2025    CALCIUM 9.0 04/09/2025     04/09/2025    K 4.0 04/09/2025    CO2 28 04/09/2025     04/09/2025    BUN 15 04/09/2025    CREATININE 0.99 04/09/2025     Lab Results   Component Value Date    HGBA1C 6.7 (H) 04/08/2025       Follow up/communication: none      Preoperative medication instructions were provided and reviewed with the patient.  Any additional testing or evaluation was explained to the patient.  Nothing by mouth instructions were discussed and patient's questions were answered prior to conclusion to this encounter.  Patient verbalized understanding of preoperative instructions given in preadmission testing; discharge instructions available in EMR.    This note was dictated with speech recognition.  Minor errors may have been detected during use of speech recognition.

## 2025-05-01 NOTE — PREPROCEDURE INSTRUCTIONS
Medication List            Accurate as of May 1, 2025  9:19 AM. Always use your most recent med list.                aspirin 81 mg EC tablet  Medication Adjustments for Surgery: Take/Use as prescribed     candesartan 8 mg tablet  Commonly known as: Atacand  Take 1 tablet (8 mg) by mouth once daily.  Medication Adjustments for Surgery: Take last dose 1 day (24 hours) before surgery  Notes to patient: Do NOT take evening before surgery and do NOT take morning of surgery.     chlorhexidine 0.12 % solution  Commonly known as: Peridex  SWISH AND SPIT 15ML FOR 30 SECONDS NIGHT PRIOR TO SURGERY AND MORNING OF SURGERY  Medication Adjustments for Surgery: Take/Use as prescribed     CitruceL 500 mg tablet  Generic drug: methylcellulose (laxative)  Medication Adjustments for Surgery: Do Not take on the morning of surgery     clopidogrel 75 mg tablet  Commonly known as: Plavix  Take 1 tablet (75 mg) by mouth once daily.  Additional Medication Adjustments for Surgery: Take last dose 7 days before surgery  Notes to patient: Last dose was 4/27/25     Culturelle 10 billion cell capsule  Generic drug: lactobacillus  Medication Adjustments for Surgery: Do Not take on the morning of surgery     Digestive Advantage Prob Gummy 250 million cell tablet,chewable  Generic drug: Bacillus coagulans  Medication Adjustments for Surgery: Do Not take on the morning of surgery     empagliflozin 25 mg tablet  Commonly known as: Jardiance  Take 1 tablet (25 mg) by mouth once daily.  Medication Adjustments for Surgery: Take last dose 3 days before surgery  Notes to patient: Last dose 5/1/25     FreeStyle Ashley 3 Sensor device  Generic drug: blood-glucose sensor  Medication Adjustments for Surgery: Take/Use as prescribed     insulin glargine 300 unit/mL (3 mL) pen  Commonly known as: Toujeo Max U-300 SoloStar  Inject 36 Units under the skin once daily at bedtime.  Additional Medication Adjustments for Surgery: Take half of your usual dose the night  "before  Notes to patient: Take 18 units night before surgery      lansoprazole 30 mg DR capsule  Commonly known as: Prevacid  TAKE 1 CAPSULE 1/2 HOUR BEFORE BREAKFAST AND DINNER  Medication Adjustments for Surgery: Take/Use as prescribed     metoclopramide 10 mg tablet  Commonly known as: Reglan  Take 1 tablet (10 mg) by mouth every 8 hours if needed (for nausea) for up to 7 days.  Medication Adjustments for Surgery: Take/Use as prescribed     metoprolol succinate XL 25 mg 24 hr tablet  Commonly known as: Toprol-XL  Take 0.5 tablets (12.5 mg) by mouth once daily.  Medication Adjustments for Surgery: Take/Use as prescribed     OneTouch Verio test strips  Generic drug: blood sugar diagnostic  Medication Adjustments for Surgery: Take/Use as prescribed     pen needle, diabetic 31 gauge x 5/16\" needle  Medication Adjustments for Surgery: Take/Use as prescribed     rosuvastatin 40 mg tablet  Commonly known as: Crestor  Take 1 tablet (40 mg) by mouth once daily.  Medication Adjustments for Surgery: Take/Use as prescribed     Vitamin D3 125 mcg (5,000 units) tablet  Generic drug: cholecalciferol  Medication Adjustments for Surgery: Do Not take on the morning of surgery                Preoperative Deep Breathing Exercises  Why it is important to do deep breathing exercises before my surgery?  Deep breathing exercises strengthen your breathing muscles.  This helps you to recover after your surgery and decreases the chance of breathing complications.  How are the deep breathing exercises done?  Sit straight with your back supported.  Breathe in deeply and slowly through your nose. Your lower rib cage should expand and your abdomen may move forward.  Hold that breath for 3 to 5 seconds.  Breathe out through pursed lips, slowly and completely.  Rest and repeat 10 times every hour while awake.  Rest longer if you become dizzy or lightheaded.        CONTACT SURGEON'S OFFICE IF YOU DEVELOP:  * Fever = 100.4 F   * New respiratory " symptoms (e.g. cough, shortness of breath, respiratory distress, sore throat)  * Recent loss of taste or smell  *Flu like symptoms such as headache, fatigue or gastrointestinal symptoms  * You develop any open sores, shingles, burning or painful urination   AND/OR:  * You no longer wish to have the surgery.  * Any other personal circumstances change that may lead to the need to cancel or defer this surgery.  *You were admitted to any hospital within one week of your planned procedure.    SMOKING:  *Quitting smoking can make a huge difference to your health and recovery from surgery.    *If you need help with quitting, call 7-637-QUIT-NOW.    THE DAY OF SURGERY:  *Do not eat any food after midnight the night before your surgery.   *YOU MUST drink 14 OUNCES of clear liquids TWO hours before your instructed ARRIVAL TIME to the hospital. This includes water, black tea/coffee (no milk or cream), apple juice, clear broth and electrolyte drinks (Gatorade).  Please avoid clear liquids that are red in color.   *You may chew gum/mints up to TWO hours before your surgery/procedure.    SURGICAL TIME:  *You will be contacted between 2 p.m. and 6 p.m. the business day before your surgery with your arrival time.  *If you haven't received a call by 6pm, call [phone].  *Scheduled surgery times may change and you will be notified if this occurs-check your personal voicemail for any updates.    ON THE MORNING OF SURGERY:  *Wear comfortable, loose fitting clothing.   *Do not use moisturizers, creams, lotions or perfume.  *All jewelry and valuables should be left at home.  *Prosthetic devices such as contact lenses, hearing aids, dentures, eyelash extensions, hairpins and body piercing must be removed before surgery.    BRING WITH YOU:  *Photo ID and insurance card  *Current list of medications and allergies  *Pacemaker/Defibrillator/Heart stent cards  *CPAP machine and mask  *Slings/splints/crutches  *Copy of your complete  Advanced Directive/DHPOA-if applicable  *Neurostimulator implant remote    PARKING AND ARRIVAL:  *Check in at the Main Entrance desk and let them know you are here for surgery.  *You will be directed to the 2nd floor surgical waiting area.    IF YOU ARE HAVING OUTPATIENT/SAME DAY SURGERY:  *A responsible adult MUST accompany you at the time of discharge and stay with you for 24 hours after your surgery.  *You may NOT drive yourself home after surgery.  *You may use a taxi or ride sharing service ([employer], Uber) to return home ONLY if you are accompanied by a friend or family member.  *Instructions for resuming your medications will be provided by your surgeon.      Home Preoperative Antibacterial Shower     What is a home preoperative antibacterial shower?  This shower is a way of cleaning the skin with a germ killing soap before surgery.  The soap contains chlorhexidine, commonly known as CHG.  CHG is a soap for your skin with germ killing ability.  Let your doctor know if you are allergic to chlorhexidine.    Why do I need to take a preoperative antibacterial shower?  Skin is not sterile.  It is best to try to make your skin as free of germs as possible before surgery.  Proper cleansing with a germ killing soap before surgery can lower the number of germs on your skin.  This helps to reduce the risk of infection at the surgical site.  Following the instructions listed below will help you prepare your skin for surgery.      How do I use the CHG skin cleanser?  Steps:  Begin using your CHG soap five days before your scheduled surgery on ________________________.    Days 1-4 Shower before bed:  Wash your face and genitals with your normal soap and rinse.           2.    Apply the CHG soap to a clean wet washcloth.  Turn the water off or move away                From the water spray to avoid premature rinsing of the CHG soap as you are applying.     3.   Lather your entire body from the neck down.  Do not use on your  face or genitals.  4. Pay special attention to the area(s) where your incision(s) will be located unless they are on your face.  Avoid scrubbing your skin too hard.  The important point is to have the CHG soap sit on your skin for 3 minutes.    When the 3 minutes are up, turn on the water and rinse the CHG soap off your body completely.   Pat yourself dry with a clean, freshly-laundered towel.  Dress in clean, freshly laundered night clothes.    Be sure to change bed sheets and blankets at least on the first night of CHG body wash use. May change linens every night of the above protocol for maximum benefit.   Day 5:  Last shower is the morning of surgery: Follow above Instructions.    NOTE:        *Keep CHG soap out of eyes and ear canals   *DO NOT wash with regular soap on your body after you have used the CHG soap solution  *DO NOT apply powders, lotions, or perfume.  *Deodorant may be used days 1-4, BUT NOT the day of surgery    Who should I contact if I have any questions regarding the use of CHG soap?  Call the [facility], Preadmission Testing at [phone] if you have any questions.              Patient Information: Pre-Operative Infection Prevention Measures     Why did I have my nose, under my arms and groin swabbed?  The purpose of the swab is to identify Staphylococcus aureus inside your nose or on your skin.  The swab was sent to the laboratory for culture.  A positive swab/culture for Staphylococcus aureus is called colonization or carriage.      What is Staphylococcus aureus?  Staphylococcus aureus, also known as “staph”, is a germ found on the skin or in the nose of healthy people.  Sometimes Staphylococcus aureus can get into the body and cause an infection.  This can be minor (such as pimples, boils or other skin problems).  It might also be serious (such as blood infection, pneumonia or a surgical site infection).    What is Staphylococcus aureus colonization or  carriage?  Colonization or carriage means that a person has the germ but is not sick from it.  These bacteria can be spread on the hands or when breathing or sneezing.    How is Staphylococcus aureus spread?  It is most often spread by close contact with a person or item that carries it.    What happens if my culture is positive for Staphylococcus aureus?  Your doctor/medical team will use this information to guide any antibiotic treatment which may be necessary.  Regardless of the culture results, we will clean the inside of your nose with a betadine swab just before you have your surgery.      Will I get an infection if I have Staphylococcus aureus in my nose or on my skin?  Anyone can get an infection with Staphylococcus aureus.  However, the best way to reduce your risk of infection is to follow the instructions provided to you for the use of your CHG soap and dental rinse.        Who should I contact if I have any questions?  Call the [facility], Preadmission Testing at [phone] if you have any questions.          Patient Information: Oral/Dental Rinse  **This is a prescription; pick it up at your preferred local pharmacy **  What is oral/dental rinse?   It is a mouthwash. It is a way of cleaning the mouth with a germ killing solution before your surgery.  The solution contains chlorhexidine, commonly known as CHG.   It is used inside the mouth to kill a bacteria known as Staphylococcus aureus.  Let your doctor know if you are allergic to Chlorhexidine.    Why do I need to use CHG oral/dental rinse?  The CHG oral/dental rinse helps to kill a bacteria in your mouth known a Staphylococcus aureus.     This reduces the risk of infection at the surgical site.      Using your CHG oral/dental rinse  STEPS:  Use your CHG oral/dental rinse after you brush your teeth the night before (at bedtime) and the morning of your surgery.  Follow all directions on your prescription label.    Use  the cap on the container to measure 15ml (fill cap to fill line)  Swish (gargle if you can) the mouthwash in your mouth for at least 30 seconds, (do not to swallow) spit out  After you use your CHG rinse, do not rinse your mouth with water, drink or eat.  Please refer to prescription label for the appropriate time to resume oral intake  Dental rinse comes in one size bottle: 473ml ~16oz.  You will have leftover    rinse, discard after this use.    What side effects might I have using the CHG oral/dental rinse?  CHG rinse will stick to plaque on the teeth.  Brush and floss just before use.  Teeth brushing will help avoid staining of plaque during use.    Who should I contact if I have questions about the CHG oral/dental rinse?  Please call [facility], Preadmission Testing at [phone] if you have any questions

## 2025-05-01 NOTE — H&P (VIEW-ONLY)
[facility]/PAT Evaluation       Name: Harpal Covarrubias (Harpal Covarrubias)  /Age: 1960/65 y.o.     In-Person         Date of Consult: 25    Referring Provider:  Dr. Cohen    Date, Surgery, and Length:  25, robot assisted umbilical hernia repair with mesh placement, 155 minutes      Patient presents to [facility] for perioperative risk assessment prior to scheduled surgery. Patient presents with history of hospitalization for incarcerated umbilical hernia that able to be reduced. He has had previous surgery for bilateral inguinal hernia repair.      This note was created in part upon personal review of patient's medical records.      Pt denies any past history of anesthetic complications such as PONV, awareness, prolonged sedation, dental damage, aspiration, cardiac arrest, difficult intubation, difficult I.V. access or unexpected hospital admissions. No history of malignant hyperthermia and or pseudocholinesterase deficiency.    No history of blood transfusions.    The patient IS NOT a [employer] and will accept blood and blood products if medically indicated.     Type and screen NOT sent.      Past Medical History:   Diagnosis Date    Atrial fibrillation (Multi)     s/p ablation    Browning esophagus     Chronic kidney disease Long Term    25 BUN 22, Creatinie 1.14, GFR 71    Colon polyp     Tubular Adenoma 10/2022 - repeat  - Gregg    Coronary artery disease     CAD with NSTEMI s/p LCx PCI 2022    Diverticulitis of colon     Dysphagia     Elevated coronary artery calcium score     2022 coronary artery calcium score 1916    GERD (gastroesophageal reflux disease) Long Term    History of echocardiogram 2025    1. Left ventricular ejection fraction is normal, calculated by Purdy's biplane at 66%  2.Spectral Doppler shows a Grade I (impaired relaxation pattern) of left ventricular diastolic filling with normal left atrial filling pressure  3 There is normal right ventricular global  systolic function 4 The left atrium is mildly dilated 5. Right ventricular systolic pressure is within normal limits.    History of kidney stones     Hyperlipidemia     Hypertension     Incarcerated umbilical hernia     plan :Robot Assisted Umbilical Hernia Repair; Mesh Placement 5/5    Myocardial infarction (Multi) 11/28/2022    NSTEMI s/p PCI    Sleep apnea     does not use CPAP- improved with weight loss.    Type 2 diabetes mellitus     4/8/25 A1c 6.7%       Family History   Problem Relation Name Age of Onset    Colon polyps Mother Virginie Covarrubias     Diabetes Maternal Grandmother Anurag     Stroke Maternal Grandfather Adarsh     Hernia Paternal Grandfather Adarsh      Social History     Tobacco Use   Smoking Status Never    Passive exposure: Never   Smokeless Tobacco Never     Social History     Substance and Sexual Activity   Alcohol Use Not Currently     Social History     Substance and Sexual Activity   Drug Use Not Currently       Allergies   Allergen Reactions    Nitroglycerin Other and Anaphylaxis     Hypotension, Passing out     Current Outpatient Medications   Medication Instructions    aspirin 81 mg EC tablet 1 tablet, Daily    Bacillus coagulans (Digestive Advantage Prob Gummy) 250 million cell tablet,chewable 1 tablet, 2 times daily    blood sugar diagnostic (OneTouch Verio test strips) strip Use to test sugars twice a day    candesartan (ATACAND) 8 mg, oral, Daily    chlorhexidine (Peridex) 0.12 % solution SWISH AND SPIT 15ML FOR 30 SECONDS NIGHT PRIOR TO SURGERY AND MORNING OF SURGERY    cholecalciferol (VITAMIN D3) 5,000 Units, Daily    clopidogrel (PLAVIX) 75 mg, oral, Daily    empagliflozin (JARDIANCE) 25 mg, oral, Daily    FreeStyle Ashley 3 Sensor device     insulin glargine (TOUJEO MAX U-300 SOLOSTAR) 36 Units, subcutaneous, Nightly    lactobacillus (Culturelle) 10 billion cell capsule 1 capsule, Daily    lansoprazole (Prevacid) 30 mg DR capsule TAKE 1 CAPSULE 1/2 HOUR BEFORE BREAKFAST AND DINNER     "methylcellulose, laxative, (CitruceL) 500 mg tablet 1 tablet, Daily    metoclopramide (REGLAN) 10 mg, oral, Every 8 hours PRN    metoprolol succinate XL (TOPROL-XL) 12.5 mg, oral, Daily    pen needle, diabetic 31 gauge x 5/16\" needle 4 times daily    rosuvastatin (CRESTOR) 40 mg, oral, Daily       PAT ROS:   Constitutional:   neg    Neuro/Psych:   neg    Eyes:    use of corrective lenses  Ears:   neg    Nose:   neg    Mouth:   neg    Throat:   neg    Neck:   neg    Cardio:   neg    Respiratory:   neg    Endocrine:   neg    GI:   neg    :   neg    Musculoskeletal:    arthralgias  Hematologic:    bruises/bleeds easily  Skin:  neg        Physical Exam  Vitals reviewed. Physical exam within normal limits.          PAT AIRWAY:   Airway:     Mallampati::  II    TM distance::  >3 FB    Neck ROM::  Full  normal          Visit Vitals  /77   Pulse 76   Temp 35.9 °C (96.6 °F)   Resp 16   Ht 1.85 m (6' 0.84\")   Wt 95 kg (209 lb 7 oz)   SpO2 97%   BMI 27.76 kg/m²   Smoking Status Never   BSA 2.21 m²       Assessment and Plan:       Patient is a 65 year old male scheduled for robot assisted umbilical hernia repair with mesh placement on 5/5/25 with Dr. Cohen.       Plan    Neuro:  No neurologic diagnosis, however, the patient is at increased risk for perioperative delirium secondary to  age, polypharmacy, type and duration of surgery, Patient instructed on and provided cognitive exercises  Patient is at increased risk for perioperative CVA secondary to  CRF, cardiac disease, perioperative interruption of anticoagulant, HTN, DM, increased age      Cardiovascular:    RCRI: 0 Risk of Mace: 0.4%    Caprini:  4 VTE risk: 0.7%    Patient denies any chest pain, tightness, heaviness, pressure, radiating pain, palpitations, irregular heartbeats, lightheadedness, cough, congestion, shortness of breath, MCGUIRE, PND, near syncope, weight loss or gain.    Good functional capacity      EKG in PAT   Encounter Date: 04/08/25   ECG 12 Lead " "  Result Value    Ventricular Rate 68    Atrial Rate 68    DE Interval 292    QRS Duration 102    QT Interval 388    QTC Calculation(Bazett) 412    P Axis 32    R Axis -36    T Axis 12    QRS Count 11    Q Onset 223    P Onset 77    P Offset 123    T Offset 417    QTC Fredericia 404    Narrative    Sinus rhythm with 1st degree AV block  Left axis deviation  Inferior infarct , age undetermined  Abnormal ECG  When compared with ECG of 14-JAN-2025 15:40,  No significant change was found  Confirmed by Tyler Funes ([id number]) on 4/22/2025 8:38:38 AM     CAD- PCI placed 2022, Plavix held 7 days prior to surgery. Patient follows with Dr. Henry    HTN- managed on Candesartan, Metoprolol    HLD- on statin    During hospitalization for incarcerated hernia April 2025, patient seen in-house by cardiologist, Dr. Funes. Per consult note:  \"1. Cardiac risk stratification/preop cardiac risk assessment: Patient currently denies chest pain, shortness of breath, or PND.  Low to Moderate / Acceptable Risks for Cardiovascular Complications.   - Continue ASA 81 mg PO daily Indefinitely (d/t h/o PCI with Cardiac stent)\"      Echo 4/9/25:  CONCLUSIONS:   1. Left ventricular ejection fraction is normal, calculated by Purdy's biplane at 66%.   2. Spectral Doppler shows a Grade I (impaired relaxation pattern) of left ventricular diastolic filling with normal left atrial filling pressure.   3. There is normal right ventricular global systolic function.   4. The left atrium is mildly dilated.   5. Right ventricular systolic pressure is within normal limits.      Pulmonary:  No pulmonary diagnosis, however patient is at increased risk of perioperative complications secondary to  age > 60, site of surgery, duration of surgery > 2 hours, types of anesthetic  Stop Bang KNOWN PRASHANT, IMPROVED WITH WEIGHT LOSS. NO CPAP  ARISCAT: >45 points, 42.1% risk of in-hospital postoperative pulmonary complication  PRODIGY: High risk for opioid induced " respiratory depression      Renal/endo:  Recommendations to avoid nephrotoxic drugs and carefully monitor fluid status to maintain euvolemia. Use dose adjusted medications as needed for the underlying level of renal function.    DM II- will hold Insulin DOS. Jardiace to be held 3 days  prior to surgery    Lab Results   Component Value Date    HGBA1C 6.7 (H) 04/08/2025       Heme:  Patient instructed to ambulate as soon as possible postoperatively to decrease thromboembolic risk.    Initiate mechanical DVT prophylaxis as soon as possible and initiate chemical prophylaxis when deemed safe from a bleeding standpoint post surgery.        Risk assessment complete.  This patient is INTERMEDIATE risk candidate undergoing MODERATE risk procedure, patient is medically optimized for surgery.      Labs/testing obtained in PAT on 5/1/25: Reviewed last CBC, BMP, A1C, EKG. Obtained MRSA    Lab Results   Component Value Date    WBC 8.8 04/09/2025    HGB 16.9 04/09/2025    HCT 48.8 04/09/2025    MCV 86 04/09/2025     04/09/2025     Lab Results   Component Value Date    GLUCOSE 101 (H) 04/09/2025    CALCIUM 9.0 04/09/2025     04/09/2025    K 4.0 04/09/2025    CO2 28 04/09/2025     04/09/2025    BUN 15 04/09/2025    CREATININE 0.99 04/09/2025     Lab Results   Component Value Date    HGBA1C 6.7 (H) 04/08/2025       Follow up/communication: none      Preoperative medication instructions were provided and reviewed with the patient.  Any additional testing or evaluation was explained to the patient.  Nothing by mouth instructions were discussed and patient's questions were answered prior to conclusion to this encounter.  Patient verbalized understanding of preoperative instructions given in preadmission testing; discharge instructions available in EMR.    This note was dictated with speech recognition.  Minor errors may have been detected during use of speech recognition.

## 2025-05-03 DIAGNOSIS — Z79.4 TYPE 2 DIABETES MELLITUS WITH OTHER OPHTHALMIC COMPLICATION, WITH LONG-TERM CURRENT USE OF INSULIN: ICD-10-CM

## 2025-05-03 DIAGNOSIS — E11.39 TYPE 2 DIABETES MELLITUS WITH OTHER OPHTHALMIC COMPLICATION, WITH LONG-TERM CURRENT USE OF INSULIN: ICD-10-CM

## 2025-05-03 LAB — STAPHYLOCOCCUS SPEC CULT: NORMAL

## 2025-05-04 RX ORDER — TIRZEPATIDE 5 MG/.5ML
INJECTION, SOLUTION SUBCUTANEOUS
Qty: 2 ML | Refills: 1 | Status: SHIPPED | OUTPATIENT
Start: 2025-05-04

## 2025-05-05 ENCOUNTER — ANESTHESIA (OUTPATIENT)
Dept: OPERATING ROOM | Facility: HOSPITAL | Age: 65
End: 2025-05-05
Payer: MEDICARE

## 2025-05-05 ENCOUNTER — HOSPITAL ENCOUNTER (OUTPATIENT)
Facility: HOSPITAL | Age: 65
Setting detail: OUTPATIENT SURGERY
Discharge: HOME | End: 2025-05-05
Attending: SURGERY | Admitting: SURGERY
Payer: MEDICARE

## 2025-05-05 ENCOUNTER — ANESTHESIA EVENT (OUTPATIENT)
Dept: OPERATING ROOM | Facility: HOSPITAL | Age: 65
End: 2025-05-05
Payer: MEDICARE

## 2025-05-05 ENCOUNTER — PHARMACY VISIT (OUTPATIENT)
Dept: PHARMACY | Facility: CLINIC | Age: 65
End: 2025-05-05
Payer: COMMERCIAL

## 2025-05-05 VITALS
DIASTOLIC BLOOD PRESSURE: 90 MMHG | HEART RATE: 72 BPM | OXYGEN SATURATION: 97 % | HEIGHT: 73 IN | WEIGHT: 210.54 LBS | TEMPERATURE: 97.9 F | SYSTOLIC BLOOD PRESSURE: 142 MMHG | BODY MASS INDEX: 27.9 KG/M2 | RESPIRATION RATE: 17 BRPM

## 2025-05-05 DIAGNOSIS — Z00.00 HEALTHCARE MAINTENANCE: Primary | ICD-10-CM

## 2025-05-05 DIAGNOSIS — K42.0 INCARCERATED UMBILICAL HERNIA: Primary | ICD-10-CM

## 2025-05-05 LAB
GLUCOSE BLD MANUAL STRIP-MCNC: 108 MG/DL (ref 74–99)
GLUCOSE BLD MANUAL STRIP-MCNC: 132 MG/DL (ref 74–99)

## 2025-05-05 PROCEDURE — 3600000017 HC OR TIME - EACH INCREMENTAL 1 MINUTE - PROCEDURE LEVEL SIX: Performed by: SURGERY

## 2025-05-05 PROCEDURE — C1781 MESH (IMPLANTABLE): HCPCS | Performed by: SURGERY

## 2025-05-05 PROCEDURE — 7100000010 HC PHASE TWO TIME - EACH INCREMENTAL 1 MINUTE: Performed by: SURGERY

## 2025-05-05 PROCEDURE — 7100000002 HC RECOVERY ROOM TIME - EACH INCREMENTAL 1 MINUTE: Performed by: SURGERY

## 2025-05-05 PROCEDURE — 7100000009 HC PHASE TWO TIME - INITIAL BASE CHARGE: Performed by: SURGERY

## 2025-05-05 PROCEDURE — A49593 PR RPR AA HERNIA 1ST 3-10 CM REDUCIBLE: Performed by: NURSE ANESTHETIST, CERTIFIED REGISTERED

## 2025-05-05 PROCEDURE — 2500000004 HC RX 250 GENERAL PHARMACY W/ HCPCS (ALT 636 FOR OP/ED): Performed by: SURGERY

## 2025-05-05 PROCEDURE — 2780000003 HC OR 278 NO HCPCS: Performed by: SURGERY

## 2025-05-05 PROCEDURE — RXMED WILLOW AMBULATORY MEDICATION CHARGE

## 2025-05-05 PROCEDURE — 7100000001 HC RECOVERY ROOM TIME - INITIAL BASE CHARGE: Performed by: SURGERY

## 2025-05-05 PROCEDURE — 82947 ASSAY GLUCOSE BLOOD QUANT: CPT

## 2025-05-05 PROCEDURE — 49593 RPR AA HRN 1ST 3-10 RDC: CPT | Performed by: SURGERY

## 2025-05-05 PROCEDURE — 2500000005 HC RX 250 GENERAL PHARMACY W/O HCPCS: Performed by: STUDENT IN AN ORGANIZED HEALTH CARE EDUCATION/TRAINING PROGRAM

## 2025-05-05 PROCEDURE — 2720000007 HC OR 272 NO HCPCS: Performed by: SURGERY

## 2025-05-05 PROCEDURE — 2500000001 HC RX 250 WO HCPCS SELF ADMINISTERED DRUGS (ALT 637 FOR MEDICARE OP): Performed by: STUDENT IN AN ORGANIZED HEALTH CARE EDUCATION/TRAINING PROGRAM

## 2025-05-05 PROCEDURE — 3700000001 HC GENERAL ANESTHESIA TIME - INITIAL BASE CHARGE: Performed by: SURGERY

## 2025-05-05 PROCEDURE — 2500000005 HC RX 250 GENERAL PHARMACY W/O HCPCS: Performed by: SURGERY

## 2025-05-05 PROCEDURE — A49593 PR RPR AA HERNIA 1ST 3-10 CM REDUCIBLE: Performed by: ANESTHESIOLOGY

## 2025-05-05 PROCEDURE — 2500000004 HC RX 250 GENERAL PHARMACY W/ HCPCS (ALT 636 FOR OP/ED): Mod: JZ | Performed by: NURSE ANESTHETIST, CERTIFIED REGISTERED

## 2025-05-05 PROCEDURE — 2500000004 HC RX 250 GENERAL PHARMACY W/ HCPCS (ALT 636 FOR OP/ED): Mod: JZ | Performed by: STUDENT IN AN ORGANIZED HEALTH CARE EDUCATION/TRAINING PROGRAM

## 2025-05-05 PROCEDURE — 3700000002 HC GENERAL ANESTHESIA TIME - EACH INCREMENTAL 1 MINUTE: Performed by: SURGERY

## 2025-05-05 PROCEDURE — 3600000018 HC OR TIME - INITIAL BASE CHARGE - PROCEDURE LEVEL SIX: Performed by: SURGERY

## 2025-05-05 DEVICE — LAPAROSCOPIC SELF-FIXATING MESH POLYESTER WITH POLYLACTIC ACID GRIPS AND COLLAGEN FILM
Type: IMPLANTABLE DEVICE | Site: ABDOMEN | Status: FUNCTIONAL
Brand: PROGRIP

## 2025-05-05 RX ORDER — ONDANSETRON HYDROCHLORIDE 2 MG/ML
INJECTION, SOLUTION INTRAVENOUS AS NEEDED
Status: DISCONTINUED | OUTPATIENT
Start: 2025-05-05 | End: 2025-05-05

## 2025-05-05 RX ORDER — FENTANYL CITRATE 50 UG/ML
INJECTION, SOLUTION INTRAMUSCULAR; INTRAVENOUS AS NEEDED
Status: DISCONTINUED | OUTPATIENT
Start: 2025-05-05 | End: 2025-05-05

## 2025-05-05 RX ORDER — WATER 1 ML/ML
INJECTION IRRIGATION AS NEEDED
Status: DISCONTINUED | OUTPATIENT
Start: 2025-05-05 | End: 2025-05-05 | Stop reason: HOSPADM

## 2025-05-05 RX ORDER — LIDOCAINE HYDROCHLORIDE 10 MG/ML
0.1 INJECTION, SOLUTION EPIDURAL; INFILTRATION; INTRACAUDAL; PERINEURAL ONCE
Status: DISCONTINUED | OUTPATIENT
Start: 2025-05-05 | End: 2025-05-05 | Stop reason: HOSPADM

## 2025-05-05 RX ORDER — OXYCODONE HYDROCHLORIDE 5 MG/1
5 TABLET ORAL EVERY 4 HOURS PRN
Status: DISCONTINUED | OUTPATIENT
Start: 2025-05-05 | End: 2025-05-05 | Stop reason: HOSPADM

## 2025-05-05 RX ORDER — LABETALOL HYDROCHLORIDE 5 MG/ML
5 INJECTION, SOLUTION INTRAVENOUS ONCE AS NEEDED
Status: DISCONTINUED | OUTPATIENT
Start: 2025-05-05 | End: 2025-05-05 | Stop reason: HOSPADM

## 2025-05-05 RX ORDER — MIDAZOLAM HYDROCHLORIDE 1 MG/ML
INJECTION INTRAMUSCULAR; INTRAVENOUS AS NEEDED
Status: DISCONTINUED | OUTPATIENT
Start: 2025-05-05 | End: 2025-05-05

## 2025-05-05 RX ORDER — DIPHENHYDRAMINE HYDROCHLORIDE 50 MG/ML
12.5 INJECTION, SOLUTION INTRAMUSCULAR; INTRAVENOUS ONCE AS NEEDED
Status: DISCONTINUED | OUTPATIENT
Start: 2025-05-05 | End: 2025-05-05 | Stop reason: HOSPADM

## 2025-05-05 RX ORDER — ONDANSETRON 4 MG/1
4 TABLET, ORALLY DISINTEGRATING ORAL EVERY 8 HOURS PRN
Qty: 20 TABLET | Refills: 0 | Status: SHIPPED | OUTPATIENT
Start: 2025-05-05

## 2025-05-05 RX ORDER — SODIUM CHLORIDE, SODIUM LACTATE, POTASSIUM CHLORIDE, CALCIUM CHLORIDE 600; 310; 30; 20 MG/100ML; MG/100ML; MG/100ML; MG/100ML
INJECTION, SOLUTION INTRAVENOUS CONTINUOUS PRN
Status: DISCONTINUED | OUTPATIENT
Start: 2025-05-05 | End: 2025-05-05

## 2025-05-05 RX ORDER — IBUPROFEN 600 MG/1
600 TABLET ORAL EVERY 6 HOURS PRN
Qty: 20 TABLET | Refills: 0 | Status: SHIPPED | OUTPATIENT
Start: 2025-05-05

## 2025-05-05 RX ORDER — BUPIVACAINE HYDROCHLORIDE 5 MG/ML
INJECTION, SOLUTION PERINEURAL AS NEEDED
Status: DISCONTINUED | OUTPATIENT
Start: 2025-05-05 | End: 2025-05-05 | Stop reason: HOSPADM

## 2025-05-05 RX ORDER — PROPOFOL 10 MG/ML
INJECTION, EMULSION INTRAVENOUS AS NEEDED
Status: DISCONTINUED | OUTPATIENT
Start: 2025-05-05 | End: 2025-05-05

## 2025-05-05 RX ORDER — LIDOCAINE HYDROCHLORIDE 20 MG/ML
INJECTION, SOLUTION EPIDURAL; INFILTRATION; INTRACAUDAL; PERINEURAL AS NEEDED
Status: DISCONTINUED | OUTPATIENT
Start: 2025-05-05 | End: 2025-05-05

## 2025-05-05 RX ORDER — SODIUM CHLORIDE, SODIUM LACTATE, POTASSIUM CHLORIDE, CALCIUM CHLORIDE 600; 310; 30; 20 MG/100ML; MG/100ML; MG/100ML; MG/100ML
100 INJECTION, SOLUTION INTRAVENOUS CONTINUOUS
Status: DISCONTINUED | OUTPATIENT
Start: 2025-05-05 | End: 2025-05-05 | Stop reason: HOSPADM

## 2025-05-05 RX ORDER — KETOROLAC TROMETHAMINE 30 MG/ML
INJECTION, SOLUTION INTRAMUSCULAR; INTRAVENOUS AS NEEDED
Status: DISCONTINUED | OUTPATIENT
Start: 2025-05-05 | End: 2025-05-05

## 2025-05-05 RX ORDER — CEFAZOLIN SODIUM 2 G/50ML
2 SOLUTION INTRAVENOUS ONCE
Status: DISCONTINUED | OUTPATIENT
Start: 2025-05-05 | End: 2025-05-05 | Stop reason: HOSPADM

## 2025-05-05 RX ORDER — ONDANSETRON HYDROCHLORIDE 2 MG/ML
4 INJECTION, SOLUTION INTRAVENOUS ONCE AS NEEDED
Status: COMPLETED | OUTPATIENT
Start: 2025-05-05 | End: 2025-05-05

## 2025-05-05 RX ORDER — CEFAZOLIN 1 G/1
INJECTION, POWDER, FOR SOLUTION INTRAVENOUS AS NEEDED
Status: DISCONTINUED | OUTPATIENT
Start: 2025-05-05 | End: 2025-05-05

## 2025-05-05 RX ORDER — ROCURONIUM BROMIDE 10 MG/ML
INJECTION, SOLUTION INTRAVENOUS AS NEEDED
Status: DISCONTINUED | OUTPATIENT
Start: 2025-05-05 | End: 2025-05-05

## 2025-05-05 RX ORDER — POLYETHYLENE GLYCOL 3350 17 G/17G
17 POWDER, FOR SOLUTION ORAL DAILY PRN
Qty: 238 G | Refills: 0 | Status: SHIPPED | OUTPATIENT
Start: 2025-05-05

## 2025-05-05 RX ORDER — OXYCODONE HYDROCHLORIDE 5 MG/1
5 TABLET ORAL EVERY 6 HOURS PRN
Qty: 12 TABLET | Refills: 0 | Status: SHIPPED | OUTPATIENT
Start: 2025-05-05

## 2025-05-05 RX ADMIN — ROCURONIUM BROMIDE 70 MG: 10 INJECTION INTRAVENOUS at 07:36

## 2025-05-05 RX ADMIN — KETOROLAC TROMETHAMINE 30 MG: 30 INJECTION, SOLUTION INTRAMUSCULAR at 09:13

## 2025-05-05 RX ADMIN — PROPOFOL 170 MG: 10 INJECTION, EMULSION INTRAVENOUS at 07:35

## 2025-05-05 RX ADMIN — DEXAMETHASONE SODIUM PHOSPHATE 4 MG: 4 INJECTION, SOLUTION INTRAMUSCULAR; INTRAVENOUS at 07:46

## 2025-05-05 RX ADMIN — Medication 2 L/MIN: at 09:40

## 2025-05-05 RX ADMIN — CEFAZOLIN 2 G: 1 INJECTION, POWDER, FOR SOLUTION INTRAMUSCULAR; INTRAVENOUS at 07:40

## 2025-05-05 RX ADMIN — HYDROMORPHONE HYDROCHLORIDE 0.5 MG: 1 INJECTION, SOLUTION INTRAMUSCULAR; INTRAVENOUS; SUBCUTANEOUS at 09:51

## 2025-05-05 RX ADMIN — ONDANSETRON 4 MG: 2 INJECTION, SOLUTION INTRAMUSCULAR; INTRAVENOUS at 10:12

## 2025-05-05 RX ADMIN — HYDROMORPHONE HYDROCHLORIDE 0.5 MG: 1 INJECTION, SOLUTION INTRAMUSCULAR; INTRAVENOUS; SUBCUTANEOUS at 09:34

## 2025-05-05 RX ADMIN — FENTANYL CITRATE 50 MCG: 50 INJECTION, SOLUTION INTRAMUSCULAR; INTRAVENOUS at 08:03

## 2025-05-05 RX ADMIN — MIDAZOLAM HYDROCHLORIDE 2 MG: 1 INJECTION, SOLUTION INTRAMUSCULAR; INTRAVENOUS at 07:29

## 2025-05-05 RX ADMIN — FENTANYL CITRATE 50 MCG: 50 INJECTION, SOLUTION INTRAMUSCULAR; INTRAVENOUS at 07:35

## 2025-05-05 RX ADMIN — PROPOFOL 30 MG: 10 INJECTION, EMULSION INTRAVENOUS at 08:03

## 2025-05-05 RX ADMIN — SUGAMMADEX 200 MG: 100 INJECTION, SOLUTION INTRAVENOUS at 09:19

## 2025-05-05 RX ADMIN — OXYCODONE HYDROCHLORIDE 5 MG: 5 TABLET ORAL at 10:17

## 2025-05-05 RX ADMIN — SODIUM CHLORIDE, SODIUM LACTATE, POTASSIUM CHLORIDE, AND CALCIUM CHLORIDE: .6; .31; .03; .02 INJECTION, SOLUTION INTRAVENOUS at 07:28

## 2025-05-05 RX ADMIN — ONDANSETRON 4 MG: 2 INJECTION, SOLUTION INTRAMUSCULAR; INTRAVENOUS at 07:46

## 2025-05-05 RX ADMIN — ROCURONIUM BROMIDE 10 MG: 10 INJECTION INTRAVENOUS at 08:09

## 2025-05-05 RX ADMIN — Medication 6 L/MIN: at 09:28

## 2025-05-05 RX ADMIN — Medication 2 L/MIN: at 09:55

## 2025-05-05 RX ADMIN — LIDOCAINE HYDROCHLORIDE 100 MG: 20 INJECTION, SOLUTION EPIDURAL; INFILTRATION; INTRACAUDAL; PERINEURAL at 07:35

## 2025-05-05 SDOH — HEALTH STABILITY: MENTAL HEALTH: CURRENT SMOKER: 0

## 2025-05-05 ASSESSMENT — PAIN SCALES - GENERAL
PAINLEVEL_OUTOF10: 7
PAINLEVEL_OUTOF10: 3
PAINLEVEL_OUTOF10: 0 - NO PAIN
PAINLEVEL_OUTOF10: 4
PAINLEVEL_OUTOF10: 7
PAINLEVEL_OUTOF10: 2
PAINLEVEL_OUTOF10: 3
PAINLEVEL_OUTOF10: 3

## 2025-05-05 ASSESSMENT — PAIN DESCRIPTION - LOCATION
LOCATION: ABDOMEN

## 2025-05-05 ASSESSMENT — PAIN - FUNCTIONAL ASSESSMENT
PAIN_FUNCTIONAL_ASSESSMENT: 0-10
PAIN_FUNCTIONAL_ASSESSMENT: UNABLE TO SELF-REPORT
PAIN_FUNCTIONAL_ASSESSMENT: 0-10
PAIN_FUNCTIONAL_ASSESSMENT: UNABLE TO SELF-REPORT
PAIN_FUNCTIONAL_ASSESSMENT: UNABLE TO SELF-REPORT
PAIN_FUNCTIONAL_ASSESSMENT: 0-10

## 2025-05-05 ASSESSMENT — COLUMBIA-SUICIDE SEVERITY RATING SCALE - C-SSRS
1. IN THE PAST MONTH, HAVE YOU WISHED YOU WERE DEAD OR WISHED YOU COULD GO TO SLEEP AND NOT WAKE UP?: NO
6. HAVE YOU EVER DONE ANYTHING, STARTED TO DO ANYTHING, OR PREPARED TO DO ANYTHING TO END YOUR LIFE?: NO
2. HAVE YOU ACTUALLY HAD ANY THOUGHTS OF KILLING YOURSELF?: NO

## 2025-05-05 ASSESSMENT — PAIN DESCRIPTION - DESCRIPTORS: DESCRIPTORS: ACHING

## 2025-05-05 NOTE — OP NOTE
Robot Assisted Umbilical Hernia Repair; Mesh Placement Operative Note     Date: 2025  OR Location: [facility] OR    Name: Harpal Covarrubias, : 1960, Age: 65 y.o., MRN: [id number], Sex: male    Diagnosis  Pre-op Diagnosis      * Incarcerated umbilical hernia [K42.0] Post-op Diagnosis     * Incarcerated umbilical hernia [K42.0]     Procedures  Robot Assisted Umbilical Hernia Repair; Mesh Placement  [id number] - SD RPR AA HERNIA 1ST 3-10 CM REDUCIBLE      Surgeons      * Fernando Cohen - Primary    Resident/Fellow/Other Assistant:  Surgeons and Role:  * No surgeons found with a matching role *    Staff:   Circulator: Minnie Garcia Person: Samara Sanchez Scrub: Minoo    Anesthesia Staff: Anesthesiologist: Carlos Alas MD  CRNA: MELISSA Phan-CRNA  C-AA: WILMER [facility]    Procedure Summary  Anesthesia: General  ASA: III  Estimated Blood Loss: 5mL  Intra-op Medications:   Administrations occurring from 0705 to 0940 on 25:   Medication Name Total Dose   sterile water irrigation solution 500 mL   ceFAZolin (Ancef) vial 1 g 2 g   dexAMETHasone (Decadron) 4 mg/mL IV Syringe 2 mL 4 mg   fentaNYL (Sublimaze) injection 50 mcg/mL 100 mcg   ketorolac (Toradol) injection 30 mg 30 mg   LR infusion Cannot be calculated   lidocaine PF (Xylocaine-MPF) local injection 2 % 100 mg   midazolam PF (Versed) injection 1 mg/mL 2 mg   ondansetron (Zofran) 2 mg/mL injection 4 mg   propofol (Diprivan) injection 10 mg/mL 200 mg   rocuronium (ZeMuron) 50 mg/5 mL injection 80 mg              Anesthesia Record               Intraprocedure I/O Totals          Output    Est. Blood Loss 10 mL    Total Output 10 mL          Specimen: No specimens collected              Drains and/or Catheters: * None in log *    Tourniquet Times:         Implants:  Implants       Type Name Action Serial No.      Surgical Mesh Sling Implant MESH, PROGRIP LAP, 10 X 15 CM, [facility] - [id number] Implanted NA              Findings: 3.5 cm  hernia    Indications: Harpal Covarrubias is an 65 y.o. male who is having surgery for Incarcerated umbilical hernia [K42.0].     The patient was seen in the preoperative area. The risks, benefits, complications, treatment options, non-operative alternatives, expected recovery and outcomes were discussed with the patient. The possibilities of reaction to medication, pulmonary aspiration, injury to surrounding structures, bleeding, recurrent infection, the need for additional procedures, failure to diagnose a condition, and creating a complication requiring transfusion or operation were discussed with the patient. The patient concurred with the proposed plan, giving informed consent.  The site of surgery was properly noted/marked if necessary per policy. The patient has been actively warmed in preoperative area. Preoperative antibiotics have been ordered and given within 1 hours of incision. Venous thrombosis prophylaxis have been ordered including bilateral sequential compression devices    Procedure Details: Patient consented for surgical correction of the periumbilical hernia.  He was in the hospital a few weeks ago with an incarcerated hernia that was reduced.  He stopped his Plavix appropriately prior to this elective surgery.  He was brought to the OR placed supine.  Timeout was performed to confirm patient procedure.  Antibiotics were given and general anesthesia was administered through an endotracheal tube.  Left arm was tucked in the table was flexed.  We electrically clipped prepped and draped the abdominal wall sterilely.  Injected Marcaine made subxiphoid midline incision with scalpel.  Fascia was incised around peritoneal cavity balloon port.  We insufflated and placed robotic very degree camera.  I placed 3 left lateral 8 mm robotic ports.  We docked the robot I went to the console.  Hernia defect was noted.  There was no bowel contents within it.  I developed my peritoneal flap working my way across the  midline reducing the hernia sac to far beyond the lateral edge of the right rectus muscle.  In the retrograde fashion I developed peritoneal flap the lateral edge of the left rectus muscle.  Once or pocket was sufficiently developed we went ahead and reestablished the linea alba by closing the hernia defect with a running #1 nonabsorbable V-Loc suture.  We then measured our space.  The defect itself was 3 x 3-1/2 cm.  I introduced a 12 x 10 ProGrip mesh.  This was oriented symmetrically and placed against the abdominal wall for the self-fixating side.  I further secured it with some interrupted 3-0 Vicryl sutures.  We then closed our peritoneal flap with a running 3 0 strata fix PDS suture.  All the needles were removed.  We then undocked the robot and removed our ports.  The subxiphoid fascia was closed with 0 Vicryl.  Skin incisions were closed with 4-0 Monocryl and Dermabond.  A binder was placed and the patient went to the recovery room in satisfactory condition    Evidence of Infection: No   Complications:  None; patient tolerated the procedure well.    Disposition: PACU - hemodynamically stable.  Condition: stable                 Additional Details:     Attending Attestation: I was present for the entire procedure.    Fernando Cohen  Phone Number: [phone]

## 2025-05-05 NOTE — PERIOPERATIVE NURSING NOTE
1100- PHASE II     1135- Home going instructions and Rx went over with patient and patient's family member. Educated on when to follow up with provider. All questions answered and patient and patient's family member verified understanding verbally.    1153- Dr. Rice cleared patient for discharge per PRASHANT protocol at this time.     1200- IV removed at this time with IV catheter tip intact upon removal    1207- Patient transported to [facility] at this time in stable condition and with all belongings via wheelchair.

## 2025-05-05 NOTE — ANESTHESIA PREPROCEDURE EVALUATION
Patient: Harpal Covarrubias    Procedure Information       Date/Time: 05/05/25 0705    Procedure: Robot Assisted Umbilical Hernia Repair; Mesh Placement (Abdomen)    Location: [facility] A OR 08 / Virtual [facility] A OR    Surgeons: Fernando Cohen MD            Relevant Problems   Cardiac   (+) Abnormal EKG   (+) Coronary artery disease   (+) Essential hypertension   (+) Heart block AV first degree   (+) Non-ST elevation myocardial infarction (NSTEMI), subsequent episode of care (Multi)   (+) Pure hypercholesterolemia      Pulmonary   (+) PRASHANT (obstructive sleep apnea)      GI   (+) Dysphagia   (+) GERD (gastroesophageal reflux disease)      Endocrine   (+) CKD stage 2 due to type 2 diabetes mellitus (Multi)   (+) Mild nonproliferative diabetic retinopathy of both eyes associated with type 2 diabetes mellitus       Clinical information reviewed:   Tobacco  Allergies  Meds   Med Hx  Surg Hx   Fam Hx  Soc Hx         Medical History[1]   Surgical History[2]  Social History[3]   Current Outpatient Medications   Medication Instructions    aspirin 81 mg EC tablet 1 tablet, Daily    Bacillus coagulans (Digestive Advantage Prob Gummy) 250 million cell tablet,chewable 1 tablet, 2 times daily    blood sugar diagnostic (OneTouch Verio test strips) strip Use to test sugars twice a day    candesartan (ATACAND) 8 mg, oral, Daily    chlorhexidine (Peridex) 0.12 % solution SWISH AND SPIT 15ML FOR 30 SECONDS NIGHT PRIOR TO SURGERY AND MORNING OF SURGERY    cholecalciferol (VITAMIN D3) 5,000 Units, Daily    clopidogrel (PLAVIX) 75 mg, oral, Daily    empagliflozin (JARDIANCE) 25 mg, oral, Daily    FreeStyle Ashley 3 Sensor device     insulin glargine (TOUJEO MAX U-300 SOLOSTAR) 36 Units, subcutaneous, Nightly    lactobacillus (Culturelle) 10 billion cell capsule 1 capsule, Daily    lansoprazole (Prevacid) 30 mg DR capsule TAKE 1 CAPSULE 1/2 HOUR BEFORE BREAKFAST AND DINNER    methylcellulose, laxative, (CitruceL) 500 mg tablet 1 tablet, Daily  "   metoclopramide (REGLAN) 10 mg, oral, Every 8 hours PRN    metoprolol succinate XL (TOPROL-XL) 12.5 mg, oral, Daily    pen needle, diabetic 31 gauge x 5/16\" needle 4 times daily    rosuvastatin (CRESTOR) 40 mg, oral, Daily    tirzepatide (Mounjaro) 5 mg/0.5 mL pen injector INJECT 5 MG UNDER THE SKIN 1 TIME PER WEEK      RX Allergies[4]     Chemistry    Lab Results   Component Value Date/Time     04/09/2025 0638     04/07/2025 1314    K 4.0 04/09/2025 0638    K 5.1 04/07/2025 1314     04/09/2025 0638     04/07/2025 1314    CO2 28 04/09/2025 0638    CO2 25 04/07/2025 1314    BUN 15 04/09/2025 0638    BUN 22 04/07/2025 1314    CREATININE 0.99 04/09/2025 0638    CREATININE 1.14 04/07/2025 1314    Lab Results   Component Value Date/Time    CALCIUM 9.0 04/09/2025 0638    CALCIUM 9.5 04/07/2025 1314    ALKPHOS 94 04/08/2025 2128    ALKPHOS 94 04/07/2025 1314    AST 18 04/08/2025 2128    AST 18 04/07/2025 1314    ALT 19 04/08/2025 2128    ALT 20 04/07/2025 1314    BILITOT 1.0 04/08/2025 2128    BILITOT 0.9 04/07/2025 1314          Lab Results   Component Value Date    HGBA1C 6.7 (H) 04/08/2025     Lab Results   Component Value Date/Time    WBC 8.8 04/09/2025 0638    WBC 9.5 04/07/2025 1314    HGB 16.9 04/09/2025 0638    HGB 18.4 (H) 04/07/2025 1314    HCT 48.8 04/09/2025 0638    HCT 54.9 (H) 04/07/2025 1314     04/09/2025 0638     04/07/2025 1314     Lab Results   Component Value Date/Time    PROTIME 11.7 04/08/2025 2128    INR 1.1 04/08/2025 2128     No results found for: \"ABORH\"  Encounter Date: 04/08/25   ECG 12 Lead   Result Value    Ventricular Rate 68    Atrial Rate 68    IN Interval 292    QRS Duration 102    QT Interval 388    QTC Calculation(Bazett) 412    P Axis 32    R Axis -36    T Axis 12    QRS Count 11    Q Onset 223    P Onset 77    P Offset 123    T Offset 417    QTC Fredericia 404    Narrative    Sinus rhythm with 1st degree AV block  Left axis deviation  Inferior " "infarct , age undetermined  Abnormal ECG  When compared with ECG of 14-JAN-2025 15:40,  No significant change was found  Confirmed by Tyler Funes ([id number]) on 4/22/2025 8:38:38 AM     No results found for this or any previous visit from the past 1095 days.       Visit Vitals  /71   Pulse 62   Temp 36.2 °C (97.2 °F) (Temporal)   Resp 18   Ht 1.854 m (6' 1\")   Wt 95.5 kg (210 lb 8.6 oz)   SpO2 96%   BMI 27.78 kg/m²   Smoking Status Never   BSA 2.22 m²     NPO/Void Status  Date of Last Liquid: 05/05/25  Time of Last Liquid: 0400  Date of Last Solid: 05/04/25  Time of Last Solid: 2200  Last Intake Type: Clear fluids  Time of Last Void: 0626        Physical Exam    Airway  Mallampati: II  TM distance: >3 FB  Mouth opening: 3 or more finger widths     Cardiovascular - normal exam   Dental - normal exam     Pulmonary - normal exam   Abdominal - normal exam           Anesthesia Plan    History of general anesthesia?: yes  History of complications of general anesthesia?: no    ASA 3     general     The patient is not a current smoker.    intravenous induction   Postoperative pain plan includes opioids.  Anesthetic plan and risks discussed with patient.  Use of blood products discussed with patient who.    Plan discussed with attending and CRNA.             [1]   Past Medical History:  Diagnosis Date    Atrial fibrillation (Multi)     s/p ablation    Browning esophagus     Chronic kidney disease Long Term    4/7/25 BUN 22, Creatinie 1.14, GFR 71    Colon polyp     Tubular Adenoma 10/2022 - repeat 2027 - [facility]    Coronary artery disease     CAD with NSTEMI s/p LCx PCI 11/2022    Diverticulitis of colon     Dysphagia     Elevated coronary artery calcium score     7/7/2022 coronary artery calcium score 1916    GERD (gastroesophageal reflux disease) Long Term    History of echocardiogram 04/09/2025    1. Left ventricular ejection fraction is normal, calculated by Purdy's biplane at 66%  2.Spectral Doppler shows a Grade " I (impaired relaxation pattern) of left ventricular diastolic filling with normal left atrial filling pressure  3 There is normal right ventricular global systolic function 4 The left atrium is mildly dilated 5. Right ventricular systolic pressure is within normal limits.    History of kidney stones     Hyperlipidemia     Hypertension     Incarcerated umbilical hernia     plan :Robot Assisted Umbilical Hernia Repair; Mesh Placement 5/5    Myocardial infarction (Multi) 11/28/2022    NSTEMI s/p PCI    Sleep apnea     does not use CPAP- improved with weight loss.    Type 2 diabetes mellitus     4/8/25 A1c 6.7%   [2]   Past Surgical History:  Procedure Laterality Date    ABLATION OF DYSRHYTHMIC FOCUS      CARDIAC CATHETERIZATION  11/28/22    CORONARY STENT PLACEMENT  11/28/22    HERNIA REPAIR  6/77    OTHER SURGICAL HISTORY  10/14/2022    Colonoscopy - [facility] - polyp f/u 2027    OTHER SURGICAL HISTORY  10/14/2022    Esophagogastroduodenoscopy - [facility]   [3]   Social History  Tobacco Use    Smoking status: Never     Passive exposure: Never    Smokeless tobacco: Never   Vaping Use    Vaping status: Never Used   Substance Use Topics    Alcohol use: Not Currently    Drug use: Not Currently   [4]   Allergies  Allergen Reactions    Nitroglycerin Other and Anaphylaxis     Hypotension, Passing out

## 2025-05-05 NOTE — ANESTHESIA PROCEDURE NOTES
Airway  Date/Time: 5/5/2025 7:38 AM  Reason: elective    Airway not difficult    Staffing  Performed: CRNA   Authorized by: Carlos Alas MD    Performed by: MELISSA Phan-MARTÍNEZ  Patient location during procedure: OR    Patient Condition  Indications for airway management: anesthesia and airway protection  Patient position: sniffing  MILS maintained throughout  Sedation level: deep     Final Airway Details   Preoxygenated: yes  Final airway type: endotracheal airway  Successful airway: ETT  Cuffed: yes   Successful intubation technique: direct laryngoscopy  Adjuncts used in placement: intubating stylet  Endotracheal tube insertion site: oral  Blade: Froilan  Blade size: #4  ETT size (mm): 7.5  Cormack-Lehane Classification: grade IIa - partial view of glottis  Placement verified by: chest auscultation and capnometry   Measured from: lips  ETT to lips (cm): 23  Ventilation between attempts: BVM  Number of attempts at approach: 1    Additional Comments  Atraumatic DVL/intubation, teeth/lips intact.  #16 Fr OG atraumatically placed intra-op per surgeon order.

## 2025-05-14 ENCOUNTER — PATIENT OUTREACH (OUTPATIENT)
Dept: CARE COORDINATION | Facility: CLINIC | Age: 65
End: 2025-05-14
Payer: MEDICARE

## 2025-05-19 ENCOUNTER — APPOINTMENT (OUTPATIENT)
Dept: PRIMARY CARE | Facility: CLINIC | Age: 65
End: 2025-05-19
Payer: MEDICARE

## 2025-05-20 ENCOUNTER — OFFICE VISIT (OUTPATIENT)
Dept: SURGERY | Facility: HOSPITAL | Age: 65
End: 2025-05-20
Payer: MEDICARE

## 2025-05-20 VITALS — DIASTOLIC BLOOD PRESSURE: 75 MMHG | HEART RATE: 71 BPM | SYSTOLIC BLOOD PRESSURE: 135 MMHG | TEMPERATURE: 97.4 F

## 2025-05-20 DIAGNOSIS — K42.0 INCARCERATED UMBILICAL HERNIA: Primary | ICD-10-CM

## 2025-05-20 PROCEDURE — 4010F ACE/ARB THERAPY RXD/TAKEN: CPT | Performed by: SURGERY

## 2025-05-20 PROCEDURE — 99213 OFFICE O/P EST LOW 20 MIN: CPT | Performed by: SURGERY

## 2025-05-20 PROCEDURE — 1159F MED LIST DOCD IN RCRD: CPT | Performed by: SURGERY

## 2025-05-20 PROCEDURE — 3078F DIAST BP <80 MM HG: CPT | Performed by: SURGERY

## 2025-05-20 PROCEDURE — 3075F SYST BP GE 130 - 139MM HG: CPT | Performed by: SURGERY

## 2025-05-20 PROCEDURE — 1126F AMNT PAIN NOTED NONE PRSNT: CPT | Performed by: SURGERY

## 2025-05-20 PROCEDURE — 3044F HG A1C LEVEL LT 7.0%: CPT | Performed by: SURGERY

## 2025-05-20 ASSESSMENT — PAIN SCALES - GENERAL: PAINLEVEL_OUTOF10: 0-NO PAIN

## 2025-05-20 NOTE — PROGRESS NOTES
Assessment/Plan   Excellent outcome 2 weeks out from robo repair incarcerated umbo hernia. Timeline for when he can resume unrestricted activity reviewed. Will see me 6 weeks    Subjective   Harpal following up after recent robo hernia repair. Feels great this week       Objective     Physical Exam  NAD  A&Ox3  Non icteric  CTA  RR  Abdomen soft min tender. Wounds clean, intact. No seroma. No recurrence  Extremities warm, well perfused         Relevant Results      No results found for this or any previous visit (from the past 24 hours).        I spent 25 minutes in the professional and overall care of this patient.      Fernando Cohen MD

## 2025-05-20 NOTE — LETTER
May 20, 2025     Noé Ralph MD  [address] [place] [facility]  [facility] [place] [address]    Patient: Harpal Covarrubias   YOB: 1960   Date of Visit: 5/20/2025       Dear Dr. Noé Ralph MD:    Thank you for referring Harpal Covarrubias to me for evaluation. Below are my notes for this consultation.  If you have questions, please do not hesitate to call me. I look forward to following your patient along with you.       Sincerely,     Fernando Cohen MD      CC: No Recipients  ______________________________________________________________________________________    Assessment/Plan  Excellent outcome 2 weeks out from robo repair incarcerated umbo hernia. Timeline for when he can resume unrestricted activity reviewed. Will see me 6 weeks    Subjective  Harpal following up after recent robo hernia repair. Feels great this week       Objective    Physical Exam  NAD  A&Ox3  Non icteric  CTA  RR  Abdomen soft min tender. Wounds clean, intact. No seroma. No recurrence  Extremities warm, well perfused         Relevant Results      No results found for this or any previous visit (from the past 24 hours).        I spent 25 minutes in the professional and overall care of this patient.      Fernando Cohen MD

## 2025-05-28 ENCOUNTER — APPOINTMENT (OUTPATIENT)
Dept: CARDIOLOGY | Facility: HOSPITAL | Age: 65
End: 2025-05-28
Payer: MEDICARE

## 2025-05-30 DIAGNOSIS — K22.719 BARRETT'S ESOPHAGUS WITH DYSPLASIA: ICD-10-CM

## 2025-05-30 RX ORDER — LANSOPRAZOLE 30 MG/1
30 CAPSULE, DELAYED RELEASE ORAL
Qty: 90 CAPSULE | Refills: 3 | Status: SHIPPED | OUTPATIENT
Start: 2025-05-30

## 2025-05-30 NOTE — PROGRESS NOTES
Needed Prevacid refill    Requested Prescriptions     Signed Prescriptions Disp Refills    lansoprazole (Prevacid) 30 mg DR capsule 90 capsule 3     Sig: Take 1 capsule (30 mg) by mouth once daily in the morning. Take before meals. Do not crush or chew.     Discussed getting back on the Tirzepatide 5 mg

## 2025-06-12 ENCOUNTER — OFFICE VISIT (OUTPATIENT)
Dept: PRIMARY CARE | Facility: CLINIC | Age: 65
End: 2025-06-12
Payer: MEDICARE

## 2025-06-12 VITALS
OXYGEN SATURATION: 97 % | TEMPERATURE: 97.8 F | HEART RATE: 72 BPM | DIASTOLIC BLOOD PRESSURE: 76 MMHG | SYSTOLIC BLOOD PRESSURE: 121 MMHG

## 2025-06-12 DIAGNOSIS — E11.39 TYPE 2 DIABETES MELLITUS WITH OTHER OPHTHALMIC COMPLICATION, WITH LONG-TERM CURRENT USE OF INSULIN: ICD-10-CM

## 2025-06-12 DIAGNOSIS — M25.421 ELBOW SWELLING, RIGHT: Primary | ICD-10-CM

## 2025-06-12 DIAGNOSIS — Z79.4 TYPE 2 DIABETES MELLITUS WITH OTHER OPHTHALMIC COMPLICATION, WITH LONG-TERM CURRENT USE OF INSULIN: ICD-10-CM

## 2025-06-12 PROCEDURE — 99214 OFFICE O/P EST MOD 30 MIN: CPT | Performed by: FAMILY MEDICINE

## 2025-06-12 PROCEDURE — 3074F SYST BP LT 130 MM HG: CPT | Performed by: FAMILY MEDICINE

## 2025-06-12 PROCEDURE — 3044F HG A1C LEVEL LT 7.0%: CPT | Performed by: FAMILY MEDICINE

## 2025-06-12 PROCEDURE — G2211 COMPLEX E/M VISIT ADD ON: HCPCS | Performed by: FAMILY MEDICINE

## 2025-06-12 PROCEDURE — 1126F AMNT PAIN NOTED NONE PRSNT: CPT | Performed by: FAMILY MEDICINE

## 2025-06-12 PROCEDURE — 3078F DIAST BP <80 MM HG: CPT | Performed by: FAMILY MEDICINE

## 2025-06-12 PROCEDURE — 4010F ACE/ARB THERAPY RXD/TAKEN: CPT | Performed by: FAMILY MEDICINE

## 2025-06-12 ASSESSMENT — PAIN SCALES - GENERAL: PAINLEVEL_OUTOF10: 0-NO PAIN

## 2025-06-12 NOTE — PROGRESS NOTES
"Subjective   Patient ID: Harpal Covarrubias is a 65 y.o. male who presents for Bleeding/Bruising (Rt elbow/arm bruising).  HPI  R arm started about 7-10 days ago   Wife had noticed - what looked to be some swelling and bruising  Did not necessarily feel hot to the touch, did not report any red streaking  No constitutional symptoms such as fever or chills  No weakness with the arm  There was no trauma that preceded this  Has had issues with elbow joint swelling in the past some time ago and was hospitalized  Given his diabetes he was certainly concerned this could be a more concerning/ominous problem -however, we did discuss his diabetes management and use of a GLP-1 - is spaced out to 2 weeks at a time and seems to be effective and well-tolerated without as much of the GI problems he was having previously      Review of Systems  Essentially noncontributory otherwise    Objective   /76 (BP Location: Left arm, Patient Position: Sitting, BP Cuff Size: Adult)   Pulse 72   Temp 36.6 °C (97.8 °F) (Temporal)     Physical Exam  General: No acute distress appears well  Right upper extremity: No increased swelling or warmth relative to left elbow, no tenderness around the right elbow - there is some general bruising more distal than proximal about that area, with resistance to supination and pronation of forearm as well as elbow flexion/extension there was no pain provoked -no strength deficit, no axillary/epitrochlear lymphadenopathy palpated    Assessment/Plan   Problem List Items Addressed This Visit    None  Visit Diagnoses         Elbow swelling, right    -  Primary        Reassurance given and likely mostly related to mild trauma (such as leaning on the elbow) in the setting of being on 2 \"blood thinners\" - do not feel need to use compression or limit activity    Continuing to keep an eye on his blood sugars, does have his follow-up for physical next month to review further    TVT of 31 minutes with greater than " 50% spent FTF in assessment/discussion and care coordination

## 2025-06-17 ENCOUNTER — PATIENT OUTREACH (OUTPATIENT)
Dept: CARE COORDINATION | Facility: CLINIC | Age: 65
End: 2025-06-17
Payer: MEDICARE

## 2025-06-18 ENCOUNTER — OFFICE VISIT (OUTPATIENT)
Dept: CARDIOLOGY | Facility: HOSPITAL | Age: 65
End: 2025-06-18
Payer: MEDICARE

## 2025-06-18 VITALS
OXYGEN SATURATION: 95 % | HEART RATE: 78 BPM | BODY MASS INDEX: 27.57 KG/M2 | WEIGHT: 208 LBS | HEIGHT: 73 IN | DIASTOLIC BLOOD PRESSURE: 81 MMHG | SYSTOLIC BLOOD PRESSURE: 128 MMHG | RESPIRATION RATE: 20 BRPM

## 2025-06-18 DIAGNOSIS — Z86.79 HISTORY OF ATRIAL FIBRILLATION: ICD-10-CM

## 2025-06-18 DIAGNOSIS — I25.10 CORONARY ARTERY DISEASE INVOLVING NATIVE CORONARY ARTERY OF NATIVE HEART WITHOUT ANGINA PECTORIS: Primary | ICD-10-CM

## 2025-06-18 DIAGNOSIS — E78.00 PURE HYPERCHOLESTEROLEMIA: ICD-10-CM

## 2025-06-18 DIAGNOSIS — I10 ESSENTIAL HYPERTENSION: ICD-10-CM

## 2025-06-18 PROCEDURE — 3074F SYST BP LT 130 MM HG: CPT | Performed by: INTERNAL MEDICINE

## 2025-06-18 PROCEDURE — 1036F TOBACCO NON-USER: CPT | Performed by: INTERNAL MEDICINE

## 2025-06-18 PROCEDURE — 99214 OFFICE O/P EST MOD 30 MIN: CPT | Performed by: INTERNAL MEDICINE

## 2025-06-18 PROCEDURE — 1160F RVW MEDS BY RX/DR IN RCRD: CPT | Performed by: INTERNAL MEDICINE

## 2025-06-18 PROCEDURE — 99212 OFFICE O/P EST SF 10 MIN: CPT

## 2025-06-18 PROCEDURE — 1159F MED LIST DOCD IN RCRD: CPT | Performed by: INTERNAL MEDICINE

## 2025-06-18 PROCEDURE — G2211 COMPLEX E/M VISIT ADD ON: HCPCS | Performed by: INTERNAL MEDICINE

## 2025-06-18 PROCEDURE — 3044F HG A1C LEVEL LT 7.0%: CPT | Performed by: INTERNAL MEDICINE

## 2025-06-18 PROCEDURE — 93005 ELECTROCARDIOGRAM TRACING: CPT | Performed by: INTERNAL MEDICINE

## 2025-06-18 PROCEDURE — 4010F ACE/ARB THERAPY RXD/TAKEN: CPT | Performed by: INTERNAL MEDICINE

## 2025-06-18 PROCEDURE — 3079F DIAST BP 80-89 MM HG: CPT | Performed by: INTERNAL MEDICINE

## 2025-06-18 PROCEDURE — 3008F BODY MASS INDEX DOCD: CPT | Performed by: INTERNAL MEDICINE

## 2025-06-18 NOTE — PROGRESS NOTES
Primary Care Physician: Noé Ralph MD  Date of Visit: 06/18/2025  3:40 PM EDT  Location of visit: [facility]     Chief Complaint:   Chief Complaint   Patient presents with    Follow-up        HPI / Summary:   Harpal Covarrubias is a 65 y.o. male who presents for followup.  He has no cardiac complaints.  He has recovered from his hernia repair.  He has resumed walking and is able to walk up and down stairs without chest pain or shortness of breath.  The patient denies chest pain, shortness of breath, palpitations, lightheadedness, syncope, orthopnea, paroxysmal nocturnal dyspnea, lower extremity edema, or bleeding problems.            Past Medical History:   Diagnosis Date    Atrial fibrillation (Multi)     s/p ablation    Browning esophagus     Chronic kidney disease Long Term    4/7/25 BUN 22, Creatinie 1.14, GFR 71    Colon polyp     Tubular Adenoma 10/2022 - repeat 2027 - [facility]    Coronary artery disease     CAD with NSTEMI s/p LCx PCI 11/2022    Diverticulitis of colon     Dysphagia     Elevated coronary artery calcium score     7/7/2022 coronary artery calcium score 1916    GERD (gastroesophageal reflux disease) Long Term    History of echocardiogram 04/09/2025    1. Left ventricular ejection fraction is normal, calculated by Purdy's biplane at 66%  2.Spectral Doppler shows a Grade I (impaired relaxation pattern) of left ventricular diastolic filling with normal left atrial filling pressure  3 There is normal right ventricular global systolic function 4 The left atrium is mildly dilated 5. Right ventricular systolic pressure is within normal limits.    History of kidney stones     Hyperlipidemia     Hypertension     Incarcerated umbilical hernia     plan :Robot Assisted Umbilical Hernia Repair; Mesh Placement 5/5    Myocardial infarction (Multi) 11/28/2022    NSTEMI s/p PCI    Sleep apnea     does not use CPAP- improved with weight loss.    Type 2 diabetes mellitus     4/8/25 A1c 6.7%        Past  Surgical History:   Procedure Laterality Date    ABLATION OF DYSRHYTHMIC FOCUS      CARDIAC CATHETERIZATION  11/28/22    CORONARY STENT PLACEMENT  11/28/22    HERNIA REPAIR  6/77    HERNIA REPAIR N/A 05/05/2025    Umbilical Hernia with Mesh Placement    OTHER SURGICAL HISTORY  10/14/2022    Colonoscopy - [facility] - polyp f/u 2027    OTHER SURGICAL HISTORY  10/14/2022    Esophagogastroduodenoscopy - [facility]          Social History:   reports that he has never smoked. He has never been exposed to tobacco smoke. He has never used smokeless tobacco. He reports that he does not currently use alcohol. He reports that he does not currently use drugs.     Family History:  family history includes Colon polyps in his mother; Diabetes in his maternal grandmother; Hernia in his paternal grandfather; Stroke in his maternal grandfather.      Allergies:  Allergies   Allergen Reactions    Nitroglycerin Other and Anaphylaxis     Hypotension, Passing out       Outpatient Medications:  Current Outpatient Medications   Medication Instructions    aspirin 81 mg EC tablet 1 tablet, Daily    Bacillus coagulans (Digestive Advantage Prob Gummy) 250 million cell tablet,chewable 1 tablet, 2 times daily    blood sugar diagnostic (OneTouch Verio test strips) strip Use to test sugars twice a day    candesartan (ATACAND) 8 mg, oral, Daily    chlorhexidine (Peridex) 0.12 % solution SWISH AND SPIT 15ML FOR 30 SECONDS NIGHT PRIOR TO SURGERY AND MORNING OF SURGERY    cholecalciferol (VITAMIN D3) 5,000 Units, Daily    clopidogrel (PLAVIX) 75 mg, oral, Daily    empagliflozin (JARDIANCE) 25 mg, oral, Daily    FreeStyle Ashley 3 Sensor device     ibuprofen 600 mg, oral, Every 6 hours PRN    insulin glargine (TOUJEO MAX U-300 SOLOSTAR) 36 Units, subcutaneous, Nightly    lactobacillus (Culturelle) 10 billion cell capsule 1 capsule, Daily    lansoprazole (PREVACID) 30 mg, oral, Daily before breakfast, Do not crush or chew.    methylcellulose, laxative,  "(CitruceL) 500 mg tablet 1 tablet, Daily    metoprolol succinate XL (TOPROL-XL) 12.5 mg, oral, Daily    ondansetron ODT (ZOFRAN-ODT) 4 mg, oral, Every 8 hours PRN    pen needle, diabetic 31 gauge x 5/16\" needle 4 times daily    polyethylene glycol (GLYCOLAX, MIRALAX) 17 g, oral, Daily PRN    rosuvastatin (CRESTOR) 40 mg, oral, Daily    tirzepatide (Mounjaro) 5 mg/0.5 mL pen injector INJECT 5 MG UNDER THE SKIN 1 TIME PER WEEK       Physical Exam:  Vitals:    06/18/25 1535   BP: 128/81   BP Location: Right arm   Patient Position: Sitting   BP Cuff Size: Adult   Pulse: 78   Resp: 20   SpO2: 95%   Weight: 94.3 kg (208 lb)   Height: 1.854 m (6' 1\")     Wt Readings from Last 5 Encounters:   06/18/25 94.3 kg (208 lb)   05/05/25 95.5 kg (210 lb 8.6 oz)   05/01/25 95 kg (209 lb 7 oz)   04/21/25 96 kg (211 lb 9.6 oz)   04/08/25 90.7 kg (200 lb)     Body mass index is 27.44 kg/m².   General: Well-developed well-nourished in no acute distress  HEENT: Normocephalic atraumatic  Neck: Supple, JVP is normal negative hepatojugular reflux 2+ carotid pulses without bruit  Pulmonary: Normal respiratory effort, clear to auscultation  Cardiovascular: No right ventricular heave, normal S1 and S2, no murmurs rubs or gallops  Abdomen: Soft nontender nondistended  Extremities: Warm without edema 2+ radial pulses bilaterally 2+ posterior tibial pulses bilaterally  Neurologic: Alert and oriented x3  Psychiatric: Normal mood and affect     Last Labs:  CMP:  Recent Labs     04/09/25  0638 04/08/25 2128 04/07/25  1314    140 139   K 4.0 3.8 5.1    106 102   CO2 28 23 25   ANIONGAP 9* 15 12   BUN 15 16 22   CREATININE 0.99 1.05 1.14   EGFR 84 78 71   GLUCOSE 101* 124* 104     Recent Labs     04/08/25 2128 04/07/25  1314 12/13/24  0957   ALBUMIN 4.1 4.5 4.4   ALKPHOS 94 94 98   ALT 19 20 29   AST 18 18 26   BILITOT 1.0 0.9 1.1     CBC:  Recent Labs     04/09/25  0638 04/08/25  2128 04/07/25  1314   WBC 8.8 10.6 9.5   HGB 16.9 18.6* " 18.4*   HCT 48.8 55.3* 54.9*    226 233   MCV 86 87 88.5     COAG:   Recent Labs     04/08/25 2128 11/28/22  1230 11/28/22  1051 12/10/21  2032   INR 1.1 1.0   < >  --    DDIMERVTE  --   --   --  369    < > = values in this interval not displayed.     HEME/ENDO:  Recent Labs     04/08/25 2128 03/18/25  1401 07/25/24  0912 05/10/24  1149 02/06/24  1041 10/30/23  1242 06/15/23  1128 04/17/23  1411   TSH  --   --   --  1.92  --  1.25  --  2.11   HGBA1C 6.7* 6.9* 7.4* 6.7*   < > 5.6   < > 6.0*    < > = values in this interval not displayed.      CARDIAC:   Recent Labs     04/08/25 2256 04/08/25 2128 12/13/24  0957 11/28/22  1140 11/28/22  1051   TROPHS 10 10 15   < > 5,250*   BNP  --   --   --   --  150*    < > = values in this interval not displayed.     Recent Labs     07/25/24  0912 05/10/24  1149 02/06/24  1041 10/30/23  1242 06/15/23  1128 04/17/23  1411 11/29/22  0542   CHOL 90 96 107   < > 82 103 96   LDLF  --   --   --   --  24 44 38   LDLCALC 34 39 42   < >  --   --   --    HDL 40.6 45.1 51.4   < > 36.0* 38.0* 36.6*   TRIG 77 61 70   < > 111 104 107    < > = values in this interval not displayed.       Last Cardiology Tests:  ECG:  Electrocardiogram performed today that I reviewed shows normal sinus rhythm with first-degree AV block left axis deviation possible prior inferior infarct.    Echo:  Echocardiogram April 9, 2025  CONCLUSIONS:   1. Left ventricular ejection fraction is normal, calculated by Purdy's biplane at 66%.   2. Spectral Doppler shows a Grade I (impaired relaxation pattern) of left ventricular diastolic filling with normal left atrial filling pressure.   3. There is normal right ventricular global systolic function.   4. The left atrium is mildly dilated.   5. Right ventricular systolic pressure is within normal limits.    Echocardiogram May 12, 2023  CONCLUSIONS:  1. Left ventricular systolic function is low normal with a 50-55% estimated ejection fraction.  2. Mid inferoseptal  segment is abnormal.  3. Spectral Doppler shows an impaired relaxation pattern of left ventricular diastolic filling.       Cath:  Cardiac catheterization November 28, 2022  Coronary Lesion Summary:  Vessel         Stenosis    Vessel Segment  LAD          40% stenosis  proximal to mid  LAD          40% stenosis      distal  1st Diagonal 60% stenosis  proximal to mid  Circumflex   100% stenosis proximal to mid  Ramus        50% stenosis     proximal  RCA          40% stenosis     proximal  RCA          30% stenosis      distal   CONCLUSIONS:   1. Severe single vessel CAD in a codominant system.   2. Successful OCT guided PCI of the proximal-mid LCx with a 3.0x18mm Resolute Solomon MARIELLA postdilated up to 3.25mm.   3. Moderate nonobstructive disease of LAD and RCA.   4. Left Ventricular end-diastolic pressure = 15.   5. Normal LVEDP.   6. No aortic stenosis.    Stress Test:  Stress Results:  No results found for this or any previous visit from the past 365 days.         Cardiac Imaging:  CT abdomen and pelvis April 8, 2025  VESSELS:  No abnormalities identified.  Abdominal aorta is normal in caliber.     CT abdomen and pelvis with contrast July 18, 2023  VESSELS:   Aortoiliac system is patent without aneurysm.  Major visceral   branches are patent. Mild atherosclerosis.   IVC and major branches are grossly patent.   Major portal venous branches are patent.       Assessment/Plan   Diagnoses and all orders for this visit:  Coronary artery disease involving native coronary artery of native heart without angina pectoris  -     ECG 12 lead (Clinic Performed)  Pure hypercholesterolemia  Essential hypertension  History of atrial fibrillation    In summary Mr. Covarrubias is a pleasant 65-year-old white male with a past medical history significant for coronary artery disease with a CT calcium score of 1916 status post PCI to a codominant left circumflex in the setting of a non-ST elevation myocardial infarction with recovery of his  LV function, hypertension, hyperlipidemia, type II non-insulin-requiring diabetes, chronic kidney disease, remote atrial fibrillation status post ablation not on anticoagulation, and obesity.  He is asymptomatic from a cardiac perspective.  He is euvolemic on exam.  His blood pressure and lipids are acceptable.  I encouraged him to increase his physical activity and continue to work on losing weight.  He will get fasting blood work with his primary physician as scheduled.  He should continue his current cardiovascular medications.  I will see him back in follow-up in 8 months.      Orders:  Orders Placed This Encounter   Procedures    ECG 12 lead (Clinic Performed)      Followup Appts:  Future Appointments   Date Time Provider Department Center   7/1/2025  1:30 PM Fernando Cohen MD [id number] [facility]   7/2/2025  1:30 PM Noé Ralph MD [id number] [facility]   1/14/2026  4:00 PM Balta Henry MD [id number] [facility]           ____________________________________________________________  Balta Henry MD  [facility]  [facility]

## 2025-06-19 DIAGNOSIS — I25.10 CORONARY ARTERY DISEASE INVOLVING NATIVE CORONARY ARTERY OF NATIVE HEART WITHOUT ANGINA PECTORIS: ICD-10-CM

## 2025-06-19 LAB
ATRIAL RATE: 78 BPM
P AXIS: 39 DEGREES
P OFFSET: 109 MS
P ONSET: 47 MS
PR INTERVAL: 328 MS
Q ONSET: 211 MS
QRS COUNT: 13 BEATS
QRS DURATION: 102 MS
QT INTERVAL: 358 MS
QTC CALCULATION(BAZETT): 408 MS
QTC FREDERICIA: 390 MS
R AXIS: -37 DEGREES
T AXIS: 21 DEGREES
T OFFSET: 390 MS
VENTRICULAR RATE: 78 BPM

## 2025-06-19 RX ORDER — CLOPIDOGREL BISULFATE 75 MG/1
75 TABLET ORAL DAILY
Qty: 90 TABLET | Refills: 0 | Status: SHIPPED | OUTPATIENT
Start: 2025-06-19

## 2025-06-20 DIAGNOSIS — I21.4 NON-ST ELEVATION MYOCARDIAL INFARCTION (NSTEMI), SUBSEQUENT EPISODE OF CARE (MULTI): ICD-10-CM

## 2025-06-20 DIAGNOSIS — I10 ESSENTIAL HYPERTENSION: ICD-10-CM

## 2025-06-23 RX ORDER — METOPROLOL SUCCINATE 25 MG/1
TABLET, EXTENDED RELEASE ORAL
Qty: 45 TABLET | Refills: 3 | Status: SHIPPED | OUTPATIENT
Start: 2025-06-23

## 2025-06-27 ENCOUNTER — LAB (OUTPATIENT)
Dept: LAB | Facility: HOSPITAL | Age: 65
End: 2025-06-27
Payer: MEDICARE

## 2025-06-27 DIAGNOSIS — Z00.00 ENCOUNTER FOR GENERAL ADULT MEDICAL EXAMINATION WITHOUT ABNORMAL FINDINGS: Primary | ICD-10-CM

## 2025-06-27 LAB
25(OH)D3 SERPL-MCNC: 40 NG/ML (ref 30–100)
ALBUMIN SERPL BCP-MCNC: 4.5 G/DL (ref 3.4–5)
ALP SERPL-CCNC: 95 U/L (ref 33–136)
ALT SERPL W P-5'-P-CCNC: 30 U/L (ref 10–52)
ANION GAP SERPL CALC-SCNC: 14 MMOL/L (ref 10–20)
APPEARANCE UR: CLEAR
AST SERPL W P-5'-P-CCNC: 28 U/L (ref 9–39)
BASOPHILS # BLD AUTO: 0.12 X10*3/UL (ref 0–0.1)
BASOPHILS NFR BLD AUTO: 1.4 %
BILIRUB SERPL-MCNC: 0.6 MG/DL (ref 0–1.2)
BILIRUB UR STRIP.AUTO-MCNC: NEGATIVE MG/DL
BUN SERPL-MCNC: 19 MG/DL (ref 6–23)
CALCIUM SERPL-MCNC: 10.1 MG/DL (ref 8.6–10.6)
CHLORIDE SERPL-SCNC: 102 MMOL/L (ref 98–107)
CHOLEST SERPL-MCNC: 107 MG/DL (ref 0–199)
CHOLESTEROL/HDL RATIO: 2.2
CO2 SERPL-SCNC: 29 MMOL/L (ref 21–32)
COLOR UR: ABNORMAL
CREAT SERPL-MCNC: 1.18 MG/DL (ref 0.5–1.3)
CRP SERPL HS-MCNC: 0.8 MG/L
EGFRCR SERPLBLD CKD-EPI 2021: 68 ML/MIN/1.73M*2
EOSINOPHIL # BLD AUTO: 0.53 X10*3/UL (ref 0–0.7)
EOSINOPHIL NFR BLD AUTO: 6.2 %
ERYTHROCYTE [DISTWIDTH] IN BLOOD BY AUTOMATED COUNT: 13.6 % (ref 11.5–14.5)
EST. AVERAGE GLUCOSE BLD GHB EST-MCNC: 157 MG/DL
GLUCOSE SERPL-MCNC: 110 MG/DL (ref 74–99)
GLUCOSE UR STRIP.AUTO-MCNC: ABNORMAL MG/DL
HBA1C MFR BLD: 7.1 % (ref ?–5.7)
HCT VFR BLD AUTO: 53.9 % (ref 41–52)
HDLC SERPL-MCNC: 49.5 MG/DL
HGB BLD-MCNC: 17.6 G/DL (ref 13.5–17.5)
HOLD SPECIMEN: NORMAL
IMM GRANULOCYTES # BLD AUTO: 0.03 X10*3/UL (ref 0–0.7)
IMM GRANULOCYTES NFR BLD AUTO: 0.4 % (ref 0–0.9)
KETONES UR STRIP.AUTO-MCNC: NEGATIVE MG/DL
LDLC SERPL CALC-MCNC: 41 MG/DL
LEUKOCYTE ESTERASE UR QL STRIP.AUTO: NEGATIVE
LYMPHOCYTES # BLD AUTO: 2.42 X10*3/UL (ref 1.2–4.8)
LYMPHOCYTES NFR BLD AUTO: 28.3 %
MCH RBC QN AUTO: 29.7 PG (ref 26–34)
MCHC RBC AUTO-ENTMCNC: 32.7 G/DL (ref 32–36)
MCV RBC AUTO: 91 FL (ref 80–100)
MONOCYTES # BLD AUTO: 0.6 X10*3/UL (ref 0.1–1)
MONOCYTES NFR BLD AUTO: 7 %
MUCOUS THREADS #/AREA URNS AUTO: NORMAL /LPF
NEUTROPHILS # BLD AUTO: 4.85 X10*3/UL (ref 1.2–7.7)
NEUTROPHILS NFR BLD AUTO: 56.7 %
NITRITE UR QL STRIP.AUTO: NEGATIVE
NON HDL CHOLESTEROL: 58 MG/DL (ref 0–149)
NRBC BLD-RTO: 0 /100 WBCS (ref 0–0)
PH UR STRIP.AUTO: 5 [PH]
PLATELET # BLD AUTO: 247 X10*3/UL (ref 150–450)
POTASSIUM SERPL-SCNC: 4.9 MMOL/L (ref 3.5–5.3)
PROT SERPL-MCNC: 7.1 G/DL (ref 6.4–8.2)
PROT UR STRIP.AUTO-MCNC: ABNORMAL MG/DL
RBC # BLD AUTO: 5.93 X10*6/UL (ref 4.5–5.9)
RBC # UR STRIP.AUTO: NEGATIVE MG/DL
RBC #/AREA URNS AUTO: NORMAL /HPF
SODIUM SERPL-SCNC: 140 MMOL/L (ref 136–145)
SP GR UR STRIP.AUTO: 1.03
TRIGL SERPL-MCNC: 82 MG/DL (ref 0–149)
TSH SERPL-ACNC: 1.85 MIU/L (ref 0.44–3.98)
UROBILINOGEN UR STRIP.AUTO-MCNC: NORMAL MG/DL
VLDL: 16 MG/DL (ref 0–40)
WBC # BLD AUTO: 8.6 X10*3/UL (ref 4.4–11.3)
WBC #/AREA URNS AUTO: NORMAL /HPF

## 2025-06-27 PROCEDURE — 84153 ASSAY OF PSA TOTAL: CPT

## 2025-06-27 PROCEDURE — 80053 COMPREHEN METABOLIC PANEL: CPT

## 2025-06-27 PROCEDURE — 84443 ASSAY THYROID STIM HORMONE: CPT

## 2025-06-27 PROCEDURE — 85025 COMPLETE CBC W/AUTO DIFF WBC: CPT

## 2025-06-27 PROCEDURE — 86141 C-REACTIVE PROTEIN HS: CPT

## 2025-06-27 PROCEDURE — 80061 LIPID PANEL: CPT

## 2025-06-27 PROCEDURE — 81001 URINALYSIS AUTO W/SCOPE: CPT

## 2025-06-27 PROCEDURE — 82306 VITAMIN D 25 HYDROXY: CPT

## 2025-06-27 PROCEDURE — 83036 HEMOGLOBIN GLYCOSYLATED A1C: CPT

## 2025-06-28 LAB — PSA SERPL-MCNC: 2.05 NG/ML

## 2025-06-30 LAB
HOLD SPECIMEN: NORMAL
HOLD SPECIMEN: NORMAL

## 2025-07-01 ENCOUNTER — OFFICE VISIT (OUTPATIENT)
Dept: SURGERY | Facility: HOSPITAL | Age: 65
End: 2025-07-01
Payer: MEDICARE

## 2025-07-01 VITALS — HEART RATE: 73 BPM | TEMPERATURE: 97.1 F | SYSTOLIC BLOOD PRESSURE: 135 MMHG | DIASTOLIC BLOOD PRESSURE: 74 MMHG

## 2025-07-01 DIAGNOSIS — K43.9 VENTRAL HERNIA WITHOUT OBSTRUCTION OR GANGRENE: Primary | ICD-10-CM

## 2025-07-01 PROCEDURE — 99213 OFFICE O/P EST LOW 20 MIN: CPT | Performed by: SURGERY

## 2025-07-01 PROCEDURE — 3078F DIAST BP <80 MM HG: CPT | Performed by: SURGERY

## 2025-07-01 PROCEDURE — 3051F HG A1C>EQUAL 7.0%<8.0%: CPT | Performed by: SURGERY

## 2025-07-01 PROCEDURE — 1126F AMNT PAIN NOTED NONE PRSNT: CPT | Performed by: SURGERY

## 2025-07-01 PROCEDURE — 3075F SYST BP GE 130 - 139MM HG: CPT | Performed by: SURGERY

## 2025-07-01 PROCEDURE — 3048F LDL-C <100 MG/DL: CPT | Performed by: SURGERY

## 2025-07-01 PROCEDURE — 1036F TOBACCO NON-USER: CPT | Performed by: SURGERY

## 2025-07-01 PROCEDURE — 1159F MED LIST DOCD IN RCRD: CPT | Performed by: SURGERY

## 2025-07-01 PROCEDURE — 4010F ACE/ARB THERAPY RXD/TAKEN: CPT | Performed by: SURGERY

## 2025-07-01 ASSESSMENT — PAIN SCALES - GENERAL: PAINLEVEL_OUTOF10: 0-NO PAIN

## 2025-07-01 NOTE — ADDENDUM NOTE
Addendum  created 06/30/25 2136 by Carlos Alas MD    Attestation recorded in Intraprocedure, Intraprocedure Attestations deleted, Intraprocedure Attestations filed

## 2025-07-01 NOTE — PROGRESS NOTES
Harpal Covarrubias is a 65 y.o. male on day 0 of admission presenting with No Principal Problem: There is no principal problem currently on the Problem List. Please update the Problem List and refresh..    Assessment/Plan   Excellent outcome following robotic assisted repair of the ventral hernia.  He can resume activities without specific limitations.  Follow-up with me in 1 year or as needed    Subjective   Harpal continues to do well.  No pain.  He is now about 6 weeks out from ventral hernia repair       Objective     Physical Exam  NAD  A&Ox3  Non icteric  CTA  RR  Abdomen soft min tender. Wounds clean, intact.  No seroma.  No clinical evidence of hernia recurrence  Extremities warm, well perfused     Last Recorded Vitals  Blood pressure 135/74, pulse 73, temperature 36.2 °C (97.1 °F).  Intake/Output last 3 Shifts:  No intake/output data recorded.    Relevant Results    Scheduled medications  Scheduled Medications[1]  Continuous medications  Continuous Medications[2]  PRN medications  PRN Medications[3]    No results found for this or any previous visit (from the past 24 hours).        I spent 25 minutes in the professional and overall care of this patient.      Fernando Cohen MD           [1] [2] [3]   
no

## 2025-07-01 NOTE — ANESTHESIA POSTPROCEDURE EVALUATION
Patient: Harpal Covarrubias    Procedure Summary       Date: 05/05/25 Room / Location: [facility] OR 08 / Virtual [facility] OR    Anesthesia Start: 0728 Anesthesia Stop: 0931    Procedure: Robot Assisted Umbilical Hernia Repair; Mesh Placement (Abdomen) Diagnosis:       Incarcerated umbilical hernia      (Incarcerated umbilical hernia [K42.0])    Surgeons: Fernando Cohen MD Responsible Provider: Carlos Alas MD    Anesthesia Type: general ASA Status: 3            Anesthesia Type: general    Vitals Value Taken Time   /74 05/05/25 10:45   Temp 36 °C (96.8 °F) 05/05/25 09:28   Pulse 57 05/05/25 10:45   Resp 17 05/05/25 10:45   SpO2 93 % 05/05/25 10:45       Anesthesia Post Evaluation    Patient location during evaluation: PACU  Patient participation: complete - patient participated  Level of consciousness: awake  Pain management: adequate  Airway patency: patent  Cardiovascular status: acceptable  Respiratory status: acceptable  Hydration status: acceptable  Postoperative Nausea and Vomiting: none        No notable events documented.

## 2025-07-01 NOTE — LETTER
July 1, 2025     Noé Ralph MD  [address]   [facility], [address]  [place] [address]    Patient: Harpal Covarrubias   YOB: 1960   Date of Visit: 7/1/2025       Dear Dr. Noé Ralph MD:    Thank you for referring Harpal Covarrubias to me for evaluation. Below are my notes for this consultation.  If you have questions, please do not hesitate to call me. I look forward to following your patient along with you.       Sincerely,     Fernando Cohen MD      CC: No Recipients  ______________________________________________________________________________________    Harpal Covarrubias is a 65 y.o. male on day 0 of admission presenting with No Principal Problem: There is no principal problem currently on the Problem List. Please update the Problem List and refresh..    Assessment/Plan  Excellent outcome following robotic assisted repair of the ventral hernia.  He can resume activities without specific limitations.  Follow-up with me in 1 year or as needed    Subjective  Harpal continues to do well.  No pain.  He is now about 6 weeks out from ventral hernia repair       Objective    Physical Exam  NAD  A&Ox3  Non icteric  CTA  RR  Abdomen soft min tender. Wounds clean, intact.  No seroma.  No clinical evidence of hernia recurrence  Extremities warm, well perfused     Last Recorded Vitals  Blood pressure 135/74, pulse 73, temperature 36.2 °C (97.1 °F).  Intake/Output last 3 Shifts:  No intake/output data recorded.    Relevant Results    Scheduled medications  Scheduled Medications[1]  Continuous medications  Continuous Medications[2]  PRN medications  PRN Medications[3]    No results found for this or any previous visit (from the past 24 hours).        I spent 25 minutes in the professional and overall care of this patient.      Fernando Cohen MD           [1]  [2]  [3]       [1]  [2]  [3]

## 2025-07-02 ENCOUNTER — APPOINTMENT (OUTPATIENT)
Dept: PRIMARY CARE | Facility: CLINIC | Age: 65
End: 2025-07-02
Payer: MEDICARE

## 2025-07-02 ENCOUNTER — OFFICE VISIT (OUTPATIENT)
Dept: PRIMARY CARE | Facility: CLINIC | Age: 65
End: 2025-07-02
Payer: MEDICARE

## 2025-07-02 VITALS
WEIGHT: 207.8 LBS | SYSTOLIC BLOOD PRESSURE: 126 MMHG | OXYGEN SATURATION: 94 % | TEMPERATURE: 98.2 F | DIASTOLIC BLOOD PRESSURE: 77 MMHG | BODY MASS INDEX: 29.75 KG/M2 | HEIGHT: 70 IN | HEART RATE: 72 BPM

## 2025-07-02 VITALS
OXYGEN SATURATION: 94 % | HEIGHT: 70 IN | DIASTOLIC BLOOD PRESSURE: 77 MMHG | HEART RATE: 72 BPM | SYSTOLIC BLOOD PRESSURE: 126 MMHG | TEMPERATURE: 98.2 F | BODY MASS INDEX: 29.75 KG/M2 | WEIGHT: 207.8 LBS

## 2025-07-02 DIAGNOSIS — E11.39 TYPE 2 DIABETES MELLITUS WITH OTHER OPHTHALMIC COMPLICATION, WITH LONG-TERM CURRENT USE OF INSULIN: ICD-10-CM

## 2025-07-02 DIAGNOSIS — N18.2 CKD STAGE 2 DUE TO TYPE 2 DIABETES MELLITUS (MULTI): ICD-10-CM

## 2025-07-02 DIAGNOSIS — Z00.00 MEDICARE ANNUAL WELLNESS VISIT, SUBSEQUENT: Primary | ICD-10-CM

## 2025-07-02 DIAGNOSIS — I25.10 CORONARY ARTERY DISEASE INVOLVING NATIVE HEART WITHOUT ANGINA PECTORIS, UNSPECIFIED VESSEL OR LESION TYPE: ICD-10-CM

## 2025-07-02 DIAGNOSIS — E11.22 CKD STAGE 2 DUE TO TYPE 2 DIABETES MELLITUS (MULTI): ICD-10-CM

## 2025-07-02 DIAGNOSIS — Z00.01 ENCOUNTER FOR GENERAL ADULT MEDICAL EXAMINATION WITH ABNORMAL FINDINGS: Primary | ICD-10-CM

## 2025-07-02 DIAGNOSIS — Z79.4 TYPE 2 DIABETES MELLITUS WITH OTHER OPHTHALMIC COMPLICATION, WITH LONG-TERM CURRENT USE OF INSULIN: ICD-10-CM

## 2025-07-02 DIAGNOSIS — E78.00 PURE HYPERCHOLESTEROLEMIA: ICD-10-CM

## 2025-07-02 DIAGNOSIS — I10 ESSENTIAL HYPERTENSION: ICD-10-CM

## 2025-07-02 PROCEDURE — 3051F HG A1C>EQUAL 7.0%<8.0%: CPT | Performed by: FAMILY MEDICINE

## 2025-07-02 PROCEDURE — 3078F DIAST BP <80 MM HG: CPT | Performed by: FAMILY MEDICINE

## 2025-07-02 PROCEDURE — 1159F MED LIST DOCD IN RCRD: CPT | Performed by: FAMILY MEDICINE

## 2025-07-02 PROCEDURE — 3074F SYST BP LT 130 MM HG: CPT | Performed by: FAMILY MEDICINE

## 2025-07-02 PROCEDURE — 3048F LDL-C <100 MG/DL: CPT | Performed by: FAMILY MEDICINE

## 2025-07-02 PROCEDURE — 1036F TOBACCO NON-USER: CPT | Performed by: FAMILY MEDICINE

## 2025-07-02 PROCEDURE — 3008F BODY MASS INDEX DOCD: CPT | Performed by: FAMILY MEDICINE

## 2025-07-02 PROCEDURE — 4010F ACE/ARB THERAPY RXD/TAKEN: CPT | Performed by: FAMILY MEDICINE

## 2025-07-02 PROCEDURE — 1126F AMNT PAIN NOTED NONE PRSNT: CPT | Performed by: FAMILY MEDICINE

## 2025-07-02 PROCEDURE — UHSPHYS PR UH SELECT PHYSICAL: Performed by: FAMILY MEDICINE

## 2025-07-02 PROCEDURE — G0439 PPPS, SUBSEQ VISIT: HCPCS | Performed by: FAMILY MEDICINE

## 2025-07-02 ASSESSMENT — ENCOUNTER SYMPTOMS
LOSS OF SENSATION IN FEET: 1
OCCASIONAL FEELINGS OF UNSTEADINESS: 0
DEPRESSION: 0

## 2025-07-02 ASSESSMENT — LIFESTYLE VARIABLES
HOW OFTEN DO YOU HAVE SIX OR MORE DRINKS ON ONE OCCASION: NEVER
HOW MANY STANDARD DRINKS CONTAINING ALCOHOL DO YOU HAVE ON A TYPICAL DAY: 1 OR 2
HOW OFTEN DO YOU HAVE A DRINK CONTAINING ALCOHOL: MONTHLY OR LESS
SKIP TO QUESTIONS 9-10: 1
AUDIT-C TOTAL SCORE: 1

## 2025-07-02 ASSESSMENT — PATIENT HEALTH QUESTIONNAIRE - PHQ9
SUM OF ALL RESPONSES TO PHQ9 QUESTIONS 1 & 2: 2
4. FEELING TIRED OR HAVING LITTLE ENERGY: NOT AT ALL
9. THOUGHTS THAT YOU WOULD BE BETTER OFF DEAD, OR OF HURTING YOURSELF: NOT AT ALL
3. TROUBLE FALLING OR STAYING ASLEEP: NOT AT ALL
6. FEELING BAD ABOUT YOURSELF - OR THAT YOU ARE A FAILURE OR HAVE LET YOURSELF OR YOUR FAMILY DOWN: NOT AT ALL
SUM OF ALL RESPONSES TO PHQ QUESTIONS 1-9: 2
1. LITTLE INTEREST OR PLEASURE IN DOING THINGS: SEVERAL DAYS
2. FEELING DOWN, DEPRESSED OR HOPELESS: SEVERAL DAYS
8. MOVING OR SPEAKING SO SLOWLY THAT OTHER PEOPLE COULD HAVE NOTICED. OR THE OPPOSITE, BEING SO FIGETY OR RESTLESS THAT YOU HAVE BEEN MOVING AROUND A LOT MORE THAN USUAL: NOT AT ALL
7. TROUBLE CONCENTRATING ON THINGS, SUCH AS READING THE NEWSPAPER OR WATCHING TELEVISION: NOT AT ALL
5. POOR APPETITE OR OVEREATING: NOT AT ALL

## 2025-07-02 ASSESSMENT — PROMIS GLOBAL HEALTH SCALE
RATE_AVERAGE_FATIGUE: MILD
RATE_SOCIAL_SATISFACTION: GOOD
RATE_AVERAGE_PAIN: 1
CARRYOUT_SOCIAL_ACTIVITIES: VERY GOOD
EMOTIONAL_PROBLEMS: RARELY
RATE_GENERAL_HEALTH: VERY GOOD
RATE_PHYSICAL_HEALTH: VERY GOOD
CARRYOUT_PHYSICAL_ACTIVITIES: MOSTLY
RATE_MENTAL_HEALTH: VERY GOOD
RATE_QUALITY_OF_LIFE: VERY GOOD

## 2025-07-02 ASSESSMENT — PAIN SCALES - GENERAL
PAINLEVEL_OUTOF10: 0-NO PAIN
PAINLEVEL_OUTOF10: 0-NO PAIN

## 2025-07-02 NOTE — PROGRESS NOTES
"Subjective   Patient ID: Harpal Covarrubias is a 65 y.o. male who presents for Annual Exam ([facility]).  HPI  1) T2DM - he is disappointed with his recent A1c test, does believe its mostly about his nutrition but he also has not been doing any regular exercise at least since his surgery -no significant hyper or hypoglycemic symptoms, taking 36 units of long-acting insulin and is also on Mounjaro 5 mg weekly -although spilling glucose in urine, he is on Jardiance -does get foot exams through his podiatry office -also up-to-date with eye exams and seen by retinal specialist and receiving injections - does have upcoming appointment with Dr. Solo Martinez at [facility]    2) Here to follow-up CAD/lipids/hypertension - denies chest pain, shortness of breath, dizziness, lightheadedness, hand or foot swelling that is new or different.  Taking and tolerating medications well.  Doing well with physical activity - but not much of it.  Recent lipid profile actually reassuring and a bit surprised as his HDL is higher despite his lack of physical activity - hsCRP also reassuring.    3) CKD Stage 2 -seems to be managing this pretty well, has not had follow-up with Dr. Lachman for some time -no significant protein noted in basic UA    4) Prevention:  Colon cancer screening: Up-to-date due in 2027  Prostate cancer screening: PSA relatively unchanged from previous, no marked symptoms  Skin cancer screening: Does see Dr. Nichols, really does need full body check though  Immunizations: Mostly up-to-date -however, given his diabetes history would still be concerned for COVID and recommend that shot along with flu shot    Review of Systems  Essentially noncontributory otherwise    Objective   /77 (BP Location: Left arm, Patient Position: Sitting, BP Cuff Size: Adult)   Pulse 72   Temp 36.8 °C (98.2 °F) (Temporal)   Ht 1.786 m (5' 10.32\")   Wt 94.3 kg (207 lb 12.8 oz)   SpO2 94%   BMI 29.55 kg/m²     Physical " Exam  Vitals and nursing note reviewed.   Constitutional:       General: He is not in acute distress.     Appearance: He is not ill-appearing.   HENT:      Head: Normocephalic and atraumatic.      Right Ear: Tympanic membrane normal.      Left Ear: Tympanic membrane normal.      Mouth/Throat:      Pharynx: No posterior oropharyngeal erythema.   Eyes:      General: No scleral icterus.     Extraocular Movements: Extraocular movements intact.      Pupils: Pupils are equal, round, and reactive to light.   Cardiovascular:      Rate and Rhythm: Normal rate and regular rhythm.      Heart sounds: No murmur heard.  Pulmonary:      Breath sounds: Normal breath sounds. No wheezing or rhonchi.   Abdominal:      General: Bowel sounds are normal. There is no distension.      Palpations: Abdomen is soft.      Tenderness: There is no abdominal tenderness.   Musculoskeletal:         General: Normal range of motion.      Cervical back: Normal range of motion.      Right lower leg: No edema.      Left lower leg: No edema.   Lymphadenopathy:      Cervical: No cervical adenopathy.   Skin:     General: Skin is warm and dry.   Neurological:      Mental Status: He is alert and oriented to person, place, and time. Mental status is at baseline.      Motor: No weakness.      Coordination: Coordination normal.      Deep Tendon Reflexes: Reflexes normal.   Psychiatric:         Mood and Affect: Mood normal.         Behavior: Behavior normal.         Thought Content: Thought content normal.         Judgment: Judgment normal.         Assessment/Plan   Problem List Items Addressed This Visit       Essential hypertension    CKD stage 2 due to type 2 diabetes mellitus (Multi)    Coronary artery disease    Pure hypercholesterolemia     Other Visit Diagnoses         Encounter for general adult medical examination with abnormal findings    -  Primary      Type 2 diabetes mellitus with other ophthalmic complication, with long-term current use of insulin   "          1) T2DM - enc to get back to even modest activity, discussed concept of \"exercise snacking\" as well as his nutritional choices if he really wants to target getting his A1c back below 7    2) CAD/lipids/hypertension - enc to get back to exercise - seems interested in Health      3) CKD Stage 2 - cpm    4) Prevention:  Skin cancer screening: Does see Dr. Nichols, really does need full body check though  Immunizations: Mostly up-to-date -however, given his diabetes history would still be concerned for COVID and recommend that shot along with flu shot       "

## 2025-07-02 NOTE — PROGRESS NOTES
"Harpal Covarrubias is a 64 year old here for a Medicare Annual Wellness Visit     Health Risk Assessment  In general, health is:  \"my health is good\" (24), \"good\" (25)     Concerns with balance:  no     Concerns with teeth or dentures:  no     Concerns with sexual function:  not noted     Felt anxious, stressed, angry, irritable, lonely, isolated, or had thoughts of hurting themself:  not noted     Has little interest or pleasure in doing things:  not noted     Bothered by feeling down, depressed, or hopeless:  not noted     Needs help with grocery shopping, cooking, housework, bathing, grooming, dressing, eating, sitting or standing, walking, using the toilet, handling finances, taking medications, using the telephone, or driving:  no     Following safety precautions in the home environment and vehicle: removed throw rugs from floors, installed grab bars in the bathroom, handrails in stairwells, having adequate lighting, wearing seatbelt at all times?:  yes     Smokes cigarettes, vapes, or chew tobacco:  no     Eats healthy foods including fruits, vegetables, whole grains, and fiber-rich foods:  no     Number of days per week engages in exercise:  rare - recovering from abd surgery - looking to get going again, would be interested in working with health        Average alcohol consumption:  very rare       Current Providers  Specialists: I have reviewed specialist-related care of the patient in the medical record.  Endo: Solo  Cardio: Henry  Ortho: Gilbert  Ophtho: Karma   Renal: Layo  GI: Cristino   DDS: Nitesh  Derm: Simone    Medical/Family history review  Reviewed and updated problem list, medical/surgical/family/social history, medications, and allergies.     Opioid use review  Patient use of opioids:  0    Depression screening  Depression Screening PHQ-2 Score   Today Basically 0         Cognitive screening  Mini Cog Score:  4/5        Functional Observation  Was the patient's timed Up & Go " test unsteady or >= 12 seconds?  No      Advance Care Planning  End of Life planning discussed, including patient's advanced directive wishes:  done - wife is DPoA     Vision and Hearing assessment  Visual acuity (required for Welcome to Medicare):  Dr Noguera - [facility]  Hearing Evaluation:  no clear hearing deficit     HPI  1) T2DM - he is disappointed with his recent A1c test, does believe its mostly about his nutrition but he also has not been doing any regular exercise at least since his surgery -no significant hyper or hypoglycemic symptoms, taking 36 units of long-acting insulin and is also on Mounjaro 5 mg weekly -although spilling glucose in urine, he is on Jardiance -does get foot exams through his podiatry office -also up-to-date with eye exams and seen by retinal specialist and receiving injections - does have upcoming appointment with Dr. Solo Martinez at [facility]    2) Here to follow-up CAD/lipids/hypertension - denies chest pain, shortness of breath, dizziness, lightheadedness, hand or foot swelling that is new or different.  Taking and tolerating medications well.  Doing well with physical activity - but not much of it.  Recent lipid profile actually reassuring and a bit surprised as his HDL is higher despite his lack of physical activity - hsCRP also reassuring.    3) CKD Stage 2 -seems to be managing this pretty well, has not had follow-up with Dr. Lachman for some time -no significant protein noted in basic UA    4) Prevention:  Colon cancer screening: Up-to-date due in 2027  Prostate cancer screening: PSA relatively unchanged from previous, no marked symptoms  Skin cancer screening: Does see Dr. Nichols, really does need full body check though  Immunizations: Mostly up-to-date -however, given his diabetes history would still be concerned for COVID and recommend that shot along with flu shot    Review of Systems  Essentially noncontributory otherwise    Objective   /77 (BP Location: Left arm,  "Patient Position: Sitting, BP Cuff Size: Adult)   Pulse 72   Temp 36.8 °C (98.2 °F) (Temporal)   Ht 1.786 m (5' 10.32\")   Wt 94.3 kg (207 lb 12.8 oz)   SpO2 94%   BMI 29.55 kg/m²     Physical Exam  Vitals and nursing note reviewed.   Constitutional:       General: He is not in acute distress.     Appearance: He is not ill-appearing.   HENT:      Head: Normocephalic and atraumatic.      Right Ear: Tympanic membrane normal.      Left Ear: Tympanic membrane normal.      Mouth/Throat:      Pharynx: No posterior oropharyngeal erythema.   Eyes:      General: No scleral icterus.     Extraocular Movements: Extraocular movements intact.      Pupils: Pupils are equal, round, and reactive to light.   Cardiovascular:      Rate and Rhythm: Normal rate and regular rhythm.      Heart sounds: No murmur heard.  Pulmonary:      Breath sounds: Normal breath sounds. No wheezing or rhonchi.   Abdominal:      General: Bowel sounds are normal. There is no distension.      Palpations: Abdomen is soft.      Tenderness: There is no abdominal tenderness.   Musculoskeletal:         General: Normal range of motion.      Cervical back: Normal range of motion.      Right lower leg: No edema.      Left lower leg: No edema.   Lymphadenopathy:      Cervical: No cervical adenopathy.   Skin:     General: Skin is warm and dry.   Neurological:      Mental Status: He is alert and oriented to person, place, and time. Mental status is at baseline.      Motor: No weakness.      Coordination: Coordination normal.      Deep Tendon Reflexes: Reflexes normal.   Psychiatric:         Mood and Affect: Mood normal.         Behavior: Behavior normal.         Thought Content: Thought content normal.         Judgment: Judgment normal.         Assessment/Plan   Problem List Items Addressed This Visit       Essential hypertension    CKD stage 2 due to type 2 diabetes mellitus (Multi)    Coronary artery disease    Pure hypercholesterolemia     Other Visit Diagnoses " "        Encounter for general adult medical examination with abnormal findings    -  Primary      Type 2 diabetes mellitus with other ophthalmic complication, with long-term current use of insulin            1) T2DM - enc to get back to even modest activity, discussed concept of \"exercise snacking\" as well as his nutritional choices if he really wants to target getting his A1c back below 7    2) CAD/lipids/hypertension - enc to get back to exercise - seems interested in Health      3) CKD Stage 2 - cpm    4) Prevention:  Skin cancer screening: Does see Dr. Nichols, really does need full body check though  Immunizations: Mostly up-to-date -however, given his diabetes history would still be concerned for COVID and recommend that shot along with flu shot    "

## 2025-07-02 NOTE — Clinical Note
"Hi Dr Henry - Saw Harpal today for an annual visit.  Seems seems to be doing pretty well overall.  Although his A1c is back up over 7, BP/Lipids/hsCRP,GFR are pretty good. He knows (as he always knows) that he just needs to prioritize his lifestyle (exercise in particular) more than he has.  (That said, will cut him a little slack as the surgery did set him back a bit).  We are adding a \"Health \" to our practice and he seems interested in that - but I told him I'd check-in in a few weeks.  Hope you are well and that you can enjoy the holiday.  BTW, in case your patients don't tell you directly, they all greatly appreciate your care!  Tod"

## 2025-08-07 ENCOUNTER — APPOINTMENT (OUTPATIENT)
Dept: CARDIOLOGY | Facility: HOSPITAL | Age: 65
End: 2025-08-07
Payer: MEDICARE

## 2025-08-07 ENCOUNTER — HOSPITAL ENCOUNTER (EMERGENCY)
Facility: HOSPITAL | Age: 65
Discharge: HOME | End: 2025-08-07
Attending: EMERGENCY MEDICINE
Payer: MEDICARE

## 2025-08-07 ENCOUNTER — APPOINTMENT (OUTPATIENT)
Dept: RADIOLOGY | Facility: HOSPITAL | Age: 65
End: 2025-08-07
Payer: MEDICARE

## 2025-08-07 VITALS
SYSTOLIC BLOOD PRESSURE: 142 MMHG | HEART RATE: 87 BPM | RESPIRATION RATE: 18 BRPM | TEMPERATURE: 98.6 F | BODY MASS INDEX: 28.98 KG/M2 | HEIGHT: 71 IN | DIASTOLIC BLOOD PRESSURE: 53 MMHG | OXYGEN SATURATION: 95 % | WEIGHT: 207 LBS

## 2025-08-07 DIAGNOSIS — R07.9 CHEST PAIN, UNSPECIFIED TYPE: Primary | ICD-10-CM

## 2025-08-07 DIAGNOSIS — E78.00 PURE HYPERCHOLESTEROLEMIA: Primary | ICD-10-CM

## 2025-08-07 DIAGNOSIS — D75.89 OTHER SPECIFIED DISEASES OF BLOOD AND BLOOD-FORMING ORGANS: ICD-10-CM

## 2025-08-07 DIAGNOSIS — R71.8 ELEVATED HEMATOCRIT: ICD-10-CM

## 2025-08-07 DIAGNOSIS — D58.2 ELEVATED HEMOGLOBIN: ICD-10-CM

## 2025-08-07 LAB
ALBUMIN SERPL BCP-MCNC: 4.1 G/DL (ref 3.4–5)
ALP SERPL-CCNC: 78 U/L (ref 33–136)
ALT SERPL W P-5'-P-CCNC: 24 U/L (ref 10–52)
ANION GAP SERPL CALC-SCNC: 11 MMOL/L (ref 10–20)
AST SERPL W P-5'-P-CCNC: 18 U/L (ref 9–39)
BASOPHILS # BLD AUTO: 0.04 X10*3/UL (ref 0–0.1)
BASOPHILS NFR BLD AUTO: 0.4 %
BILIRUB SERPL-MCNC: 0.9 MG/DL (ref 0–1.2)
BNP SERPL-MCNC: 55 PG/ML (ref 0–99)
BUN SERPL-MCNC: 26 MG/DL (ref 6–23)
CALCIUM SERPL-MCNC: 9 MG/DL (ref 8.6–10.3)
CARDIAC TROPONIN I PNL SERPL HS: 6 NG/L (ref 0–20)
CARDIAC TROPONIN I PNL SERPL HS: 8 NG/L (ref 0–20)
CHLORIDE SERPL-SCNC: 110 MMOL/L (ref 98–107)
CO2 SERPL-SCNC: 23 MMOL/L (ref 21–32)
CREAT SERPL-MCNC: 1.15 MG/DL (ref 0.5–1.3)
D DIMER PPP FEU-MCNC: 674 NG/ML FEU
EGFRCR SERPLBLD CKD-EPI 2021: 70 ML/MIN/1.73M*2
EOSINOPHIL # BLD AUTO: 0.12 X10*3/UL (ref 0–0.7)
EOSINOPHIL NFR BLD AUTO: 1.2 %
ERYTHROCYTE [DISTWIDTH] IN BLOOD BY AUTOMATED COUNT: 14.1 % (ref 11.5–14.5)
FLUAV RNA RESP QL NAA+PROBE: NOT DETECTED
FLUBV RNA RESP QL NAA+PROBE: NOT DETECTED
GLUCOSE SERPL-MCNC: 137 MG/DL (ref 74–99)
HCT VFR BLD AUTO: 54 % (ref 41–52)
HGB BLD-MCNC: 18.4 G/DL (ref 13.5–17.5)
IMM GRANULOCYTES # BLD AUTO: 0.02 X10*3/UL (ref 0–0.7)
IMM GRANULOCYTES NFR BLD AUTO: 0.2 % (ref 0–0.9)
INR PPP: 1 (ref 0.9–1.1)
LIPASE SERPL-CCNC: 28 U/L (ref 9–82)
LYMPHOCYTES # BLD AUTO: 0.81 X10*3/UL (ref 1.2–4.8)
LYMPHOCYTES NFR BLD AUTO: 7.8 %
MAGNESIUM SERPL-MCNC: 1.83 MG/DL (ref 1.6–2.4)
MCH RBC QN AUTO: 29.8 PG (ref 26–34)
MCHC RBC AUTO-ENTMCNC: 34.1 G/DL (ref 32–36)
MCV RBC AUTO: 87 FL (ref 80–100)
MONOCYTES # BLD AUTO: 0.77 X10*3/UL (ref 0.1–1)
MONOCYTES NFR BLD AUTO: 7.4 %
NEUTROPHILS # BLD AUTO: 8.66 X10*3/UL (ref 1.2–7.7)
NEUTROPHILS NFR BLD AUTO: 83 %
NRBC BLD-RTO: 0 /100 WBCS (ref 0–0)
PLATELET # BLD AUTO: 225 X10*3/UL (ref 150–450)
POTASSIUM SERPL-SCNC: 4.3 MMOL/L (ref 3.5–5.3)
PROT SERPL-MCNC: 6.4 G/DL (ref 6.4–8.2)
PROTHROMBIN TIME: 11.3 SECONDS (ref 9.8–12.4)
RBC # BLD AUTO: 6.18 X10*6/UL (ref 4.5–5.9)
SARS-COV-2 RNA RESP QL NAA+PROBE: NOT DETECTED
SODIUM SERPL-SCNC: 140 MMOL/L (ref 136–145)
WBC # BLD AUTO: 10.4 X10*3/UL (ref 4.4–11.3)

## 2025-08-07 PROCEDURE — 2500000004 HC RX 250 GENERAL PHARMACY W/ HCPCS (ALT 636 FOR OP/ED): Performed by: EMERGENCY MEDICINE

## 2025-08-07 PROCEDURE — 83735 ASSAY OF MAGNESIUM: CPT | Performed by: EMERGENCY MEDICINE

## 2025-08-07 PROCEDURE — 93005 ELECTROCARDIOGRAM TRACING: CPT

## 2025-08-07 PROCEDURE — 2500000005 HC RX 250 GENERAL PHARMACY W/O HCPCS: Performed by: EMERGENCY MEDICINE

## 2025-08-07 PROCEDURE — 85610 PROTHROMBIN TIME: CPT | Performed by: EMERGENCY MEDICINE

## 2025-08-07 PROCEDURE — 71045 X-RAY EXAM CHEST 1 VIEW: CPT | Performed by: RADIOLOGY

## 2025-08-07 PROCEDURE — 85025 COMPLETE CBC W/AUTO DIFF WBC: CPT | Performed by: EMERGENCY MEDICINE

## 2025-08-07 PROCEDURE — 2550000001 HC RX 255 CONTRASTS: Performed by: EMERGENCY MEDICINE

## 2025-08-07 PROCEDURE — 99285 EMERGENCY DEPT VISIT HI MDM: CPT | Mod: 25 | Performed by: EMERGENCY MEDICINE

## 2025-08-07 PROCEDURE — 36415 COLL VENOUS BLD VENIPUNCTURE: CPT | Performed by: EMERGENCY MEDICINE

## 2025-08-07 PROCEDURE — 96374 THER/PROPH/DIAG INJ IV PUSH: CPT

## 2025-08-07 PROCEDURE — 81270 JAK2 GENE: CPT | Performed by: FAMILY MEDICINE

## 2025-08-07 PROCEDURE — 71275 CT ANGIOGRAPHY CHEST: CPT | Mod: FOREIGN READ | Performed by: RADIOLOGY

## 2025-08-07 PROCEDURE — 71275 CT ANGIOGRAPHY CHEST: CPT

## 2025-08-07 PROCEDURE — 83880 ASSAY OF NATRIURETIC PEPTIDE: CPT | Performed by: EMERGENCY MEDICINE

## 2025-08-07 PROCEDURE — 84484 ASSAY OF TROPONIN QUANT: CPT | Performed by: EMERGENCY MEDICINE

## 2025-08-07 PROCEDURE — 93971 EXTREMITY STUDY: CPT | Mod: FOREIGN READ | Performed by: RADIOLOGY

## 2025-08-07 PROCEDURE — 93971 EXTREMITY STUDY: CPT

## 2025-08-07 PROCEDURE — 83690 ASSAY OF LIPASE: CPT | Performed by: EMERGENCY MEDICINE

## 2025-08-07 PROCEDURE — 71045 X-RAY EXAM CHEST 1 VIEW: CPT

## 2025-08-07 PROCEDURE — 85379 FIBRIN DEGRADATION QUANT: CPT | Performed by: EMERGENCY MEDICINE

## 2025-08-07 PROCEDURE — 87636 SARSCOV2 & INF A&B AMP PRB: CPT | Performed by: EMERGENCY MEDICINE

## 2025-08-07 PROCEDURE — 87635 SARS-COV-2 COVID-19 AMP PRB: CPT | Performed by: EMERGENCY MEDICINE

## 2025-08-07 PROCEDURE — 96361 HYDRATE IV INFUSION ADD-ON: CPT

## 2025-08-07 PROCEDURE — 2500000001 HC RX 250 WO HCPCS SELF ADMINISTERED DRUGS (ALT 637 FOR MEDICARE OP): Performed by: EMERGENCY MEDICINE

## 2025-08-07 PROCEDURE — 84075 ASSAY ALKALINE PHOSPHATASE: CPT | Performed by: EMERGENCY MEDICINE

## 2025-08-07 RX ORDER — ALUMINUM HYDROXIDE, MAGNESIUM HYDROXIDE, AND SIMETHICONE 1200; 120; 1200 MG/30ML; MG/30ML; MG/30ML
30 SUSPENSION ORAL ONCE
Status: COMPLETED | OUTPATIENT
Start: 2025-08-07 | End: 2025-08-07

## 2025-08-07 RX ORDER — FAMOTIDINE 10 MG/ML
20 INJECTION, SOLUTION INTRAVENOUS ONCE
Status: COMPLETED | OUTPATIENT
Start: 2025-08-07 | End: 2025-08-07

## 2025-08-07 RX ORDER — LIDOCAINE HYDROCHLORIDE 20 MG/ML
15 SOLUTION OROPHARYNGEAL ONCE
Status: COMPLETED | OUTPATIENT
Start: 2025-08-07 | End: 2025-08-07

## 2025-08-07 RX ADMIN — LIDOCAINE HYDROCHLORIDE 15 ML: 20 SOLUTION ORAL at 21:32

## 2025-08-07 RX ADMIN — ALUMINUM HYDROXIDE, MAGNESIUM HYDROXIDE, AND DIMETHICONE 30 ML: 200; 20; 200 SUSPENSION ORAL at 21:32

## 2025-08-07 RX ADMIN — FAMOTIDINE 20 MG: 10 INJECTION, SOLUTION INTRAVENOUS at 11:13

## 2025-08-07 RX ADMIN — SODIUM CHLORIDE, SODIUM LACTATE, POTASSIUM CHLORIDE, AND CALCIUM CHLORIDE 1000 ML: .6; .31; .03; .02 INJECTION, SOLUTION INTRAVENOUS at 11:13

## 2025-08-07 RX ADMIN — IOHEXOL 75 ML: 350 INJECTION, SOLUTION INTRAVENOUS at 18:05

## 2025-08-07 ASSESSMENT — PAIN DESCRIPTION - DESCRIPTORS: DESCRIPTORS: DISCOMFORT;TIGHTNESS

## 2025-08-07 NOTE — ED PROVIDER NOTES
History/Exam limitations: none.   Additional history was obtained from patient and past medical records.          HPI:    Harpal Covarrubias is a 65 y.o. male PMH hypertension, hyperlipidemia, CAD, PCI, diabetes, presented for evaluation of burning chest discomfort nausea vomiting diarrhea.  Patient states that he had a busy week with work and spending time with his family who was visiting.  States that he was getting little sleep and eating a lot of foods that he does not typically eat.  He states that they left and yesterday evening he was exhausted and felt super around 9 PM.  States that he woke up few hours later began having nonbloody nausea vomiting diarrhea.  Reports he had numerous episodes.  Continued to have nausea and vomiting throughout the night into this morning.  Has some burning type sensation in his chest that feels like heartburn however he states that he had some chest pressure and some heartburn type characteristics to when he had ACS 2 years ago requiring PCI.  Compliant with his medications including aspirin and clopidogrel.  Received p.o. aspirin en route with EMS.  No shortness of breath.  No abdominal pain.        Physical Exam:  ED Triage Vitals [08/07/25 1025]   Temperature Heart Rate Respirations BP   37 °C (98.6 °F) 98 18 122/80      Pulse Ox Temp Source Heart Rate Source Patient Position   100 % Oral Monitor Lying      BP Location FiO2 (%)     Right arm --        GEN:      Alert, NAD  Eyes:       PERRL, EOMI  HENT:      NC/AT, OP clear, airway patent, MM  CV:      RRR, no MRG, no LE pitting edema, 2+ radial and pedal pulses  PULM:     CTAB, no w/r/r, easy WOB, symmetric chest rise  ABD:      Soft, NT, ND, no masses, BS +  :       No CVA TTP  NEURO:   A&Ox3, no focal deficits    MSK:      FROM, no joint deformities or swelling, no e/o trauma  SKIN:       Warm and dry, diffuse ecchymosis to the volar aspect of distal right upper arm elbow and proximal forearm, no warmth or erythema, no  pain with range of motion of the elbow, no warmth or swelling over bursa  PSYCH:    Appropriate mood and affect         MDM/ED Course:   Harpal Covarrubias is a 65 y.o. male PMH hypertension, hyperlipidemia, CAD, PCI, diabetes, presented for evaluation of burning chest discomfort nausea vomiting diarrhea.  Vitals reassuring.  Exam as documented above.  Differential includes ACS, PNA, PE, aortic dissection/pathology, pneumothorax, pericardial effusion, pericarditis, myocarditis, GERD, musculoskeletal pain, pleurisy.  Reassuring that patient's symptoms started after vomiting and are primarily similar to reflux.  He is concerned that he had reflux type symptoms with his prior episode of ACS however when patient had prior ACS, symptoms were much more significant.  Low suspicion for aortic dissection given overall quality of symptoms, exam, history.  Is reassuring the patient having lower GI symptoms as well increasing suspicion for gastroenteritis and resultant discomfort after vomiting.  No abdominal tenderness, low suspicion for acute intra-abdominal pathology.  Unlikely pericarditis given overall quality of symptoms, lack of positional component, EKG, lack of friction rub.  Unlikely myocarditis given overall clinical picture exam vitals history.  EKG reviewed by radiology myself with no acute findings.  EKG as below.  Troponin negative x 2 at 6 then 8 ng/L.  Patient had several hours of symptoms prior to arrival to the ED which further lower suspicion for ACS.  CBC with no leukocytosis however hemoglobin is elevated at 18.4 which is consistent with recent baseline.  BNP normal.  Chemistry overall reassuring.  Flu COVID-negative.  Lipase negative.  Patient received Pepcid, fluids.  Chest pressure resolved.  Spoke to patient's primary care provider and also reassured by workup, plan was made for discharge.  While discussing results with patient he brought to my attention and ecchymosis to the medial aspect of the distal  left upper arm and proximal left forearm.  She has been present for several days.  Does not appear infected on exam.  She does not recall any trauma and does appear ecchymotic.  Is on dual antiplatelet.  Potentially minor trauma caused.  Given distribution differential includes DVT.  D-dimer was added and mildly elevated at 674 just over age-adjusted.  Plan was made to obtain right upper quadrant ultrasound and CT of the chest for PE after risk-benefit discussion with patient.  No DVT on ultrasound.  CT angio chest for PE with no PE, does display extensive calcified disease in the coronary arteries.  As above patient's chest pain did resolve.  Did still have some mild reflux and GI cocktail was given.  Remainder as below.    EKG reviewed by me: 10:27 AM sinus rhythm, rate 99, left axis, first-degree AV block with CO interval 278 ms, no STEMI, similar to prior EKG from June 18, 2025.    EKG reviewed by me: 12:18 PM sinus rhythm, rate 87, first-degree AV block with CO interval 322 ms, no STEMI, normal QRS and QTc intervals, similar to prior EKGs.     ED Course as of 08/09/25 1411   Thu Aug 07, 2025   1519 Patient notified me that there is bruising to the medial aspect of his distal R inner upper arm and forarm, no pain, started with some dark discoloration to the R forearm. Was active w his kids on recent visit, oswaldo deal, however bruising was present prior to oswaldo.  [JM]   5973 Patient is had some transient hypoxia and tachypnea.  D-dimer positive, discussed risk benefits of imaging ultimately plan was made to CT angio chest as well as right upper ext ultrasound [JM]   8368 Okay patient burning reflux sensation remain resolved.  CT was quite delayed, I did call radiology operations and attempt to obtain a read, it was held up due to a backup at foundations reportedly.  Was ultimately read no PE no acute findings.  Does have coronary calcifications, patient has a known history of CAD.  Given constellation  of upper and lower GI symptoms and reflux after having recurrent vomiting with diarrhea, suspicion for gastroenteritis and resultant reflux is high and reassuring that there is 2 negative troponins.  Patient feels comfortable going home as does his primary care provider.  Primary care provider will follow closely advise close follow-up with him and cardiology for consideration of repeat stress test and return to emergency room immediately if any recurrent chest sx. [JM]   2126 Several hours of CP with negative trop, unlikely ACS given negative troponin.  [JM]      ED Course User Index  [JM] Rangel Pierson MD         Diagnoses as of 08/09/25 1411   Chest pain, unspecified type     Explained findings, exam/study results, the importance of follow up and the plan moving forward; patient and/or caregiver verbalized understanding and agreement. All questions were answered and no new questions at this time. Patient will be discharged with strict return precautions, follow up plan with PCP/cardiology.    Chronic medical conditions affecting care listed in MDM. I independently reviewed imaging studies and agreed with radiology reads. I reviewed recent and relevant outside records including PCP notes, Prior discharge summaries, and prior radiology reports.    Procedure  Procedures    Diagnosis:   1.  Chest pain  2.  Gastroenteritis    Dispo: Discharged in stable condition      Disclaimer: Portions of this note were dictated by speech recognition. An attempt at proof reading was made to minimize errors. Minor errors in transcription may be present.  Please call if questions.     Rangel Pierson MD  08/09/25 1411

## 2025-08-07 NOTE — ED TRIAGE NOTES
PT TO ED VIA EMS FROM HOME FOR CP THAT STARTED LAST NIGHT. EMS STATES THAT PT HAS A HX OF A HEART ATTACK ABOUT A YEAR AGO AND HAS A STENT. PT STATES THAT HIS PAIN IS STARTING TO FEEL SIMILAR LIKE HOW IT DID IN THE PAST. PT STATES THAT HIS CHEST FEELS TIGHT.

## 2025-08-08 ENCOUNTER — TELEPHONE (OUTPATIENT)
Dept: PRIMARY CARE | Facility: CLINIC | Age: 65
End: 2025-08-08
Payer: MEDICARE

## 2025-08-08 DIAGNOSIS — R19.7 DIARRHEA, UNSPECIFIED TYPE: Primary | ICD-10-CM

## 2025-08-08 LAB
ATRIAL RATE: 87 BPM
ATRIAL RATE: 99 BPM
P AXIS: 17 DEGREES
P AXIS: 23 DEGREES
P OFFSET: 114 MS
P OFFSET: 134 MS
P ONSET: 49 MS
P ONSET: 72 MS
PR INTERVAL: 278 MS
PR INTERVAL: 322 MS
Q ONSET: 210 MS
Q ONSET: 211 MS
QRS COUNT: 14 BEATS
QRS COUNT: 17 BEATS
QRS DURATION: 98 MS
QRS DURATION: 98 MS
QT INTERVAL: 322 MS
QT INTERVAL: 334 MS
QTC CALCULATION(BAZETT): 401 MS
QTC CALCULATION(BAZETT): 413 MS
QTC FREDERICIA: 377 MS
QTC FREDERICIA: 380 MS
R AXIS: -34 DEGREES
R AXIS: -43 DEGREES
T AXIS: 49 DEGREES
T AXIS: 54 DEGREES
T OFFSET: 372 MS
T OFFSET: 377 MS
VENTRICULAR RATE: 87 BPM
VENTRICULAR RATE: 99 BPM

## 2025-08-08 RX ORDER — DIPHENOXYLATE HYDROCHLORIDE AND ATROPINE SULFATE 2.5; .025 MG/1; MG/1
1 TABLET ORAL 4 TIMES DAILY PRN
Qty: 40 TABLET | Refills: 0 | Status: SHIPPED | OUTPATIENT
Start: 2025-08-08

## 2025-08-08 NOTE — DISCHARGE INSTRUCTIONS
You were seen in the emergency room today for evaluation of chest symptoms.  Your labs and imaging were overall reassuring.  Your CT does show some evidence of

## 2025-08-08 NOTE — TELEPHONE ENCOUNTER
Reached out to patient this morning to check in and see how doing  Said he had a rough night with a lot of diarrhea  Was doing pretty well in the ED yesterday and did try eating there to make sure he could keep food down  He is not really having much nausea just a lot of diarrhea  No fever or chills or blood in stool noted  Does have some Imodium to try at home but also discussed the possibility of having Lomotil around for him to try  Encouraged to reach out through the weekend as need be    I have personally reviewed the [facility]AR[facility] report for this patient.  The report is scanned into the EMR. I have considered the risks of abuse, dependence, addiction, and diversion.    Requested Prescriptions     Signed Prescriptions Disp Refills    diphenoxylate-atropine (Lomotil) 2.5-0.025 mg tablet 40 tablet 0     Sig: Take 1 tablet by mouth 4 times a day as needed for diarrhea.     Authorizing Provider: NIKUNJ OTERO     Also asked him if he recalls ever being worked up for polycythemia vera or the fact that he tends to have high red blood cell mass and hemoglobin - he does not recall any such eval or workup  Did add a JAK2 test to his blood test yesterday and await the result of that before looking into that potential issue further

## 2025-08-11 DIAGNOSIS — R11.0 NAUSEA: ICD-10-CM

## 2025-08-11 DIAGNOSIS — R19.7 DIARRHEA, UNSPECIFIED TYPE: Primary | ICD-10-CM

## 2025-08-11 DIAGNOSIS — K21.9 GASTROESOPHAGEAL REFLUX DISEASE, UNSPECIFIED WHETHER ESOPHAGITIS PRESENT: ICD-10-CM

## 2025-08-11 LAB
ELECTRONICALLY SIGNED BY: NORMAL
JAK2 V617F INTERPRETATION: NORMAL
JAK2 V617F RESULT: NOT DETECTED

## (undated) DEVICE — SUTURE, MONOCRYL, 4-0, 18 IN, PS2, UNDYED

## (undated) DEVICE — SHEAR, W/UNIPOLAR CAUTERY, ENDOSHEAR, 5 MM

## (undated) DEVICE — SUTURE, STRATAFIX, 3-0 SPIRAL PDS PLUS, 15CM SH VIOLET

## (undated) DEVICE — SYSTEM, TROCAR LAP, 5X100MM, SHIELD BLADED, KII ADVANCED FIX ABDOMINAL

## (undated) DEVICE — APPLICATOR, CHLORAPREP, W/ORANGE TINT, 26ML

## (undated) DEVICE — DRAPE, ARM XI

## (undated) DEVICE — SUTURE, PDS II, 0, 27 IN, CT1, VIOLET

## (undated) DEVICE — Device

## (undated) DEVICE — TROCAR SYSTEM, BALLOON, KII GELPORT, 12 X 100MM

## (undated) DEVICE — SUTURE, VICRYL PLUS 3-0, SH, 27IN

## (undated) DEVICE — SUTURE, VICRYL PLUS, 0, 27IN, UR-6, VIOLET, BRAIDED

## (undated) DEVICE — DRAPE, COLUMN, DAVINCI XI

## (undated) DEVICE — DRIVER, NEEDLE, MEGA SUTURE CUT, DAVINCI XI

## (undated) DEVICE — LUBRICANT, ELECTROLUBE, F/ELECTRODE TIPS

## (undated) DEVICE — ADHESIVE, SKIN, DERMABOND ADVANCED, 15CM, PEN-STYLE

## (undated) DEVICE — CLEAN KIT, ANTIFOG SCOPE, SEE SHARP 150MM

## (undated) DEVICE — SCOPE WARMER, LAPAROSCOPE, BAG ONLY, LF

## (undated) DEVICE — COVER, TIP HOT SHEARS ENDOWRIST

## (undated) DEVICE — SUTURE, VICRYL PLUS, 4-0, 27 IN, PS-2

## (undated) DEVICE — FORCEPS, BIPOLAR FENESTRATED XI

## (undated) DEVICE — TUBE SET, PNEUMOLAR HEATED, SMOKE EVACU, HIGH-FLOW

## (undated) DEVICE — SUTURE, VICRYL, 0, 27 IN, UR-6, VIOLET

## (undated) DEVICE — DRAPE, SHEET, THREE QUARTER, FAN FOLD, 57 X 77 IN

## (undated) DEVICE — PUMP, STRYKERFLOW 2 & HANDPIECE W/10FT. IRRIGATION TUBING

## (undated) DEVICE — SUTURE, V-LOC PBT, 1 DVC, GS-21, 18 IN, BLUE, NONABS

## (undated) DEVICE — SCISSORS, MONOPOLAR, CURVED, 8MM

## (undated) DEVICE — CANNULA, KII ADVANCED FIXATION, 5X100MM W/SEAL

## (undated) DEVICE — GLOVE, SURGICAL, PROTEXIS PI ORTHO, 7.0, PF, LF

## (undated) DEVICE — SEAL, UNIVERSAL, 5-12MM

## (undated) DEVICE — GOWN, SURGICAL, SMARTGOWN, LARGE, STERILE

## (undated) DEVICE — GLOVE, SURGICAL, PROTEXIS PI W/NEU-THERA, 7.5, PF, LF

## (undated) DEVICE — CORD, MONOPOLAR, HIGH FREQUENCY, W/8MM PLUG F/VALLEYLAB, 8FT/244CM, STRL

## (undated) DEVICE — ABDOMINAL BINDER, 3-PANEL, 9, 72 - 84""

## (undated) DEVICE — OBTURATOR, BLADELESS , SU

## (undated) DEVICE — GLOVE, SURGICAL, PROTEXIS PI BLUE W/NEUTHERA, 8.0, PF, LF